# Patient Record
Sex: FEMALE | Race: WHITE | NOT HISPANIC OR LATINO | Employment: OTHER | URBAN - NONMETROPOLITAN AREA
[De-identification: names, ages, dates, MRNs, and addresses within clinical notes are randomized per-mention and may not be internally consistent; named-entity substitution may affect disease eponyms.]

---

## 2017-01-12 ENCOUNTER — OFFICE VISIT (OUTPATIENT)
Dept: FAMILY MEDICINE CLINIC | Facility: CLINIC | Age: 67
End: 2017-01-12

## 2017-01-12 VITALS
HEART RATE: 92 BPM | DIASTOLIC BLOOD PRESSURE: 75 MMHG | RESPIRATION RATE: 12 BRPM | OXYGEN SATURATION: 93 % | BODY MASS INDEX: 20.8 KG/M2 | SYSTOLIC BLOOD PRESSURE: 120 MMHG | HEIGHT: 62 IN | TEMPERATURE: 98.4 F | WEIGHT: 113 LBS

## 2017-01-12 DIAGNOSIS — Z00.00 HEALTHCARE MAINTENANCE: ICD-10-CM

## 2017-01-12 DIAGNOSIS — Z12.31 ENCOUNTER FOR SCREENING MAMMOGRAM FOR BREAST CANCER: ICD-10-CM

## 2017-01-12 DIAGNOSIS — G43.009 MIGRAINE WITHOUT AURA AND WITHOUT STATUS MIGRAINOSUS, NOT INTRACTABLE: ICD-10-CM

## 2017-01-12 DIAGNOSIS — R73.03 PREDIABETES: ICD-10-CM

## 2017-01-12 DIAGNOSIS — E78.2 MIXED HYPERLIPIDEMIA: ICD-10-CM

## 2017-01-12 DIAGNOSIS — J44.9 CHRONIC OBSTRUCTIVE PULMONARY DISEASE, UNSPECIFIED COPD TYPE (HCC): ICD-10-CM

## 2017-01-12 DIAGNOSIS — M81.0 OSTEOPOROSIS: ICD-10-CM

## 2017-01-12 DIAGNOSIS — K21.9 GASTROESOPHAGEAL REFLUX DISEASE WITHOUT ESOPHAGITIS: ICD-10-CM

## 2017-01-12 DIAGNOSIS — N39.0 RECURRENT UTI: ICD-10-CM

## 2017-01-12 DIAGNOSIS — K58.8 OTHER IRRITABLE BOWEL SYNDROME: ICD-10-CM

## 2017-01-12 DIAGNOSIS — I10 ESSENTIAL HYPERTENSION: Primary | ICD-10-CM

## 2017-01-12 DIAGNOSIS — M15.9 PRIMARY OSTEOARTHRITIS INVOLVING MULTIPLE JOINTS: ICD-10-CM

## 2017-01-12 DIAGNOSIS — N32.89 BLADDER INSTABILITY: ICD-10-CM

## 2017-01-12 LAB
25(OH)D3 SERPL-MCNC: 31 NG/ML
ALBUMIN SERPL-MCNC: 4.6 G/DL (ref 3.4–4.8)
ALBUMIN/GLOB SERPL: 1.4 G/DL (ref 1.5–2.5)
ALP SERPL-CCNC: 81 U/L (ref 46–116)
ALT SERPL W P-5'-P-CCNC: 14 U/L (ref 10–36)
ANION GAP SERPL CALCULATED.3IONS-SCNC: 3.2 MMOL/L (ref 3.6–11.2)
AST SERPL-CCNC: 15 U/L (ref 10–30)
BASOPHILS # BLD AUTO: 0.04 10*3/MM3 (ref 0–0.3)
BASOPHILS NFR BLD AUTO: 0.4 % (ref 0–2)
BILIRUB SERPL-MCNC: 0.5 MG/DL (ref 0.2–1.8)
BUN BLD-MCNC: 10 MG/DL (ref 7–21)
BUN/CREAT SERPL: 12.8 (ref 7–25)
CALCIUM SPEC-SCNC: 9.6 MG/DL (ref 7.7–10)
CHLORIDE SERPL-SCNC: 109 MMOL/L (ref 99–112)
CHOLEST SERPL-MCNC: 197 MG/DL (ref 0–200)
CO2 SERPL-SCNC: 27.8 MMOL/L (ref 24.3–31.9)
CREAT BLD-MCNC: 0.78 MG/DL (ref 0.43–1.29)
DEPRECATED RDW RBC AUTO: 47.1 FL (ref 37–54)
EOSINOPHIL # BLD AUTO: 0.21 10*3/MM3 (ref 0–0.7)
EOSINOPHIL NFR BLD AUTO: 2.3 % (ref 0–7)
ERYTHROCYTE [DISTWIDTH] IN BLOOD BY AUTOMATED COUNT: 13.4 % (ref 11.5–14.5)
GFR SERPL CREATININE-BSD FRML MDRD: 74 ML/MIN/1.73
GLOBULIN UR ELPH-MCNC: 3.4 GM/DL
GLUCOSE BLD-MCNC: 101 MG/DL (ref 70–110)
HBA1C MFR BLD: 5.6 % (ref 4.5–5.7)
HCT VFR BLD AUTO: 39 % (ref 37–47)
HDLC SERPL-MCNC: 70 MG/DL (ref 60–100)
HGB BLD-MCNC: 13.1 G/DL (ref 12–16)
IMM GRANULOCYTES # BLD: 0.02 10*3/MM3 (ref 0–0.03)
IMM GRANULOCYTES NFR BLD: 0.2 % (ref 0–0.5)
LDLC SERPL CALC-MCNC: 103 MG/DL (ref 0–100)
LDLC/HDLC SERPL: 1.47 {RATIO}
LYMPHOCYTES # BLD AUTO: 3.56 10*3/MM3 (ref 1–3)
LYMPHOCYTES NFR BLD AUTO: 38.5 % (ref 16–46)
MCH RBC QN AUTO: 32.4 PG (ref 27–33)
MCHC RBC AUTO-ENTMCNC: 33.6 G/DL (ref 33–37)
MCV RBC AUTO: 96.5 FL (ref 80–94)
MONOCYTES # BLD AUTO: 0.5 10*3/MM3 (ref 0.1–0.9)
MONOCYTES NFR BLD AUTO: 5.4 % (ref 0–12)
NEUTROPHILS # BLD AUTO: 4.92 10*3/MM3 (ref 1.4–6.5)
NEUTROPHILS NFR BLD AUTO: 53.2 % (ref 40–75)
OSMOLALITY SERPL CALC.SUM OF ELEC: 278.6 MOSM/KG (ref 273–305)
PLATELET # BLD AUTO: 254 10*3/MM3 (ref 130–400)
PMV BLD AUTO: 10.9 FL (ref 6–10)
POTASSIUM BLD-SCNC: 3.5 MMOL/L (ref 3.5–5.3)
PROT SERPL-MCNC: 8 G/DL (ref 6–8)
RBC # BLD AUTO: 4.04 10*6/MM3 (ref 4.2–5.4)
SODIUM BLD-SCNC: 140 MMOL/L (ref 135–153)
TRIGL SERPL-MCNC: 120 MG/DL (ref 0–150)
TSH SERPL DL<=0.05 MIU/L-ACNC: 5.22 MIU/ML (ref 0.55–4.78)
VLDLC SERPL-MCNC: 24 MG/DL
WBC NRBC COR # BLD: 9.25 10*3/MM3 (ref 4.5–12.5)

## 2017-01-12 PROCEDURE — 36415 COLL VENOUS BLD VENIPUNCTURE: CPT | Performed by: GENERAL PRACTICE

## 2017-01-12 PROCEDURE — 80061 LIPID PANEL: CPT | Performed by: GENERAL PRACTICE

## 2017-01-12 PROCEDURE — 82306 VITAMIN D 25 HYDROXY: CPT | Performed by: GENERAL PRACTICE

## 2017-01-12 PROCEDURE — 85025 COMPLETE CBC W/AUTO DIFF WBC: CPT | Performed by: GENERAL PRACTICE

## 2017-01-12 PROCEDURE — 83036 HEMOGLOBIN GLYCOSYLATED A1C: CPT | Performed by: GENERAL PRACTICE

## 2017-01-12 PROCEDURE — 36415 COLL VENOUS BLD VENIPUNCTURE: CPT

## 2017-01-12 PROCEDURE — 80053 COMPREHEN METABOLIC PANEL: CPT | Performed by: GENERAL PRACTICE

## 2017-01-12 PROCEDURE — 99213 OFFICE O/P EST LOW 20 MIN: CPT | Performed by: GENERAL PRACTICE

## 2017-01-12 PROCEDURE — 84443 ASSAY THYROID STIM HORMONE: CPT | Performed by: GENERAL PRACTICE

## 2017-01-12 RX ORDER — PANTOPRAZOLE SODIUM 40 MG/1
40 TABLET, DELAYED RELEASE ORAL 2 TIMES DAILY
Qty: 60 TABLET | Refills: 5 | Status: SHIPPED | OUTPATIENT
Start: 2017-01-12 | End: 2017-05-12 | Stop reason: SDUPTHER

## 2017-01-12 NOTE — MR AVS SNAPSHOT
Albino AGUIRRE Sameer   1/12/2017 11:00 AM   Office Visit    Dept Phone:  [phone]   Encounter #:  [id number]    Provider:  Stan Alvarado MD   Department:  [facility]                Your Full Care Plan              Today's Medication Changes          These changes are accurate as of: 1/12/17 12:03 PM.  If you have any questions, ask your nurse or doctor.               Medication(s)that have changed:     pantoprazole 40 MG EC tablet   Commonly known as:  PROTONIX   Take 1 tablet by mouth 2 (Two) Times a Day.   What changed:  when to take this   Changed by:  Stan Alvarado MD            Where to Get Your Medications      These medications were sent to [place], [place] - [address] - [phone] [facility] [phone] [address] [address]     Phone:  [phone]     pantoprazole 40 MG EC tablet                  Your Updated Medication List          This list is accurate as of: 1/12/17 12:03 PM.  Always use your most recent med list.                diltiaZEM 360 MG 24 hr capsule   Commonly known as:  TIAZAC   Take 1 capsule by mouth daily.       estradiol 0.1 MG/GM vaginal cream   Commonly known as:  ESTRACE   Insert 2 g into the vagina 2 (two) times a week.       fluconazole 150 MG tablet   Commonly known as:  DIFLUCAN   Take 1 tablet by mouth Daily.       hydrochlorothiazide 25 MG tablet   Commonly known as:  HYDRODIURIL   Take 1 tablet by mouth daily. 1/2 tablet daily       ipratropium-albuterol 0.5-2.5 mg/mL nebulizer   Commonly known as:  DUONEB   Take 3 mL by nebulization Every 4 (Four) Hours As Needed for wheezing or shortness of air. Via nebulizer       meclizine 25 MG tablet   Commonly known as:  ANTIVERT   Take 1 tablet by mouth 3 (three) times a day as needed for dizziness.       montelukast 10 MG tablet   Commonly known as:  SINGULAIR   Take 1 tablet by mouth daily.       pantoprazole 40 MG EC  tablet   Commonly known as:  PROTONIX   Take 1 tablet by mouth 2 (Two) Times a Day.       simvastatin 40 MG tablet   Commonly known as:  ZOCOR   Take 1 tablet by mouth Every Night.       SUMAtriptan 100 MG tablet   Commonly known as:  IMITREX   Take 1 tablet by mouth 1 (One) Time As Needed for migraine for up to 1 dose.       tolterodine LA 4 MG 24 hr capsule   Commonly known as:  DETROL LA   Take 1 capsule by mouth every night.       vitamin B-12 1000 MCG tablet   Commonly known as:  CYANOCOBALAMIN   Take 1 tablet by mouth daily.               We Performed the Following     CBC & Differential     CBC Auto Differential     Comprehensive Metabolic Panel     Hemoglobin A1c     Lipid Panel     TSH     Vitamin D 25 Hydroxy       You Were Diagnosed With        Codes Comments    Essential hypertension    -  Primary ICD-10-CM: I10  ICD-9-CM: 401.9     Migraine without aura and without status migrainosus, not intractable     ICD-10-CM: G43.009  ICD-9-CM: 346.10     Chronic obstructive pulmonary disease, unspecified COPD type     ICD-10-CM: J44.9  ICD-9-CM: 496     Gastroesophageal reflux disease without esophagitis     ICD-10-CM: K21.9  ICD-9-CM: 530.81     Other irritable bowel syndrome     ICD-10-CM: K58.8  ICD-9-CM: 564.1     Bladder instability     ICD-10-CM: N32.89  ICD-9-CM: 596.89     Osteoporosis     ICD-10-CM: M81.0  ICD-9-CM: 733.00     Primary osteoarthritis involving multiple joints     ICD-10-CM: M15.0  ICD-9-CM: 715.09     Recurrent UTI     ICD-10-CM: N39.0  ICD-9-CM: 599.0     Mixed hyperlipidemia     ICD-10-CM: E78.2  ICD-9-CM: 272.2     Prediabetes     ICD-10-CM: R73.03  ICD-9-CM: 790.29     Healthcare maintenance     ICD-10-CM: Z00.00  ICD-9-CM: V70.0     Encounter for screening mammogram for breast cancer     ICD-10-CM: Z12.31  ICD-9-CM: V76.12       Instructions     None    Patient Instructions History      Upcoming Appointments     Visit Type Date Time Department    OFFICE VISIT 1/12/2017 11:00 AM [facility]  "[facility]    OFFICE VISIT 5/12/2017 10:30 AM [facility]      MyChart Signup     Our records indicate that you have declined [facility]. If you would like to sign up for [employer], please email [email or web address] or call [phone] to obtain an activation code.             Other Info from Your Visit           Your Appointments     May 12, 2017 10:30 AM EDT   Office Visit with Stan Alvarado MD   [facility] (--)    [address] [place] [id number] [phone]           Please arrive 10 minutes early. Bring a complete list of all medications and bring any previous records or diagnostic testing results.              Allergies     No Known Allergies      Vital Signs     Pulse Temperature Height Weight Oxygen Saturation Body Mass Index    108 98.4 °F (36.9 °C) (Tympanic) 62\" (157.5 cm) 113 lb (51.3 kg) 93% 20.67 kg/m2    Smoking Status                   Current Every Day Smoker           Problems and Diagnoses Noted     Irritable bladder    Chronic airway obstruction    Encounter for screening mammogram for breast cancer    High blood pressure    Acid reflux disease    Routine medical exam    Spasmodic colon    Migraines    Mixed hyperlipidemia    Osteoporosis    Prediabetes    Osteoarthritis (arthritis due to wear and tear of joints)    Recurrent UTI      Results         "

## 2017-01-12 NOTE — PROGRESS NOTES
Subjective   Albino Estrella is a 66 y.o. female.     History of Present Illness     COPD  The patient reports that her SOB, cough and wheezing have been stable since last here. She states that these symptoms occur at any time during the day or night. She reports that her symptoms become worse with activity. Her symptoms are reduced after using  her albuterol/ipratroprium nebulizer. The patient states she also has nasal congestion and post nasal drip/clearing throat. She is following the treatment plan, including medications as directed. She currently smokes approximately 1 packs per day.    Hypertension  Home blood pressure readings: not doing. Associated signs and symptoms: dyspnea. Patient denies: chest pain, palpitations, orthopnea, paroxysmal nocturnal dyspnea and peripheral edema. Current antihypertensive medications includes diltiazem and HCTZ. Medication compliance: taking as prescribed.  She has yet to undergo the blood work arranged at her last several visits    GERD  Patient gives a history of of heartburn and regurgitation. Symptoms have been present for a number years . Symptoms include abdominal bloating. The patient denies dysphagia, hematemesis and melena. Symptoms appear to be worsened by large meals and lying down. Risk factors present for GERD include tobacco abuse and caffeine use. Risk factors absent for GERD are tobacco abuse and caffeine use. Studies performed so far include none. Treatments tried so far include lifestyle change including caffeine reduction, dietary changes, elevate HOB, tobacco cessation, proton pump inhibitor: pantoprazole. Results of treatment: almost no occurrences of symptoms provided she takes this twice daily. Currently, the symptoms are intermittent and occur approximately 1 times per month.    The following portions of the patient's history were reviewed and updated as appropriate: allergies, current medications, past medical history, past social history and problem  list.    Review of Systems   Constitutional: Positive for fatigue. Negative for appetite change, chills, fever and unexpected weight change.   HENT: Positive for congestion, postnasal drip and sinus pressure. Negative for ear pain, sneezing, sore throat and voice change.    Eyes: Negative for visual disturbance.   Respiratory: Positive for cough, shortness of breath and wheezing. Negative for stridor.    Cardiovascular: Negative for chest pain, palpitations and leg swelling.   Gastrointestinal: Negative for abdominal pain, blood in stool, constipation, diarrhea, nausea and vomiting.   Endocrine: Negative for polydipsia.   Genitourinary: Negative for difficulty urinating, dysuria, frequency, hematuria, menstrual problem, pelvic pain, urgency, vaginal bleeding and vaginal discharge.   Musculoskeletal: Negative for arthralgias, back pain, joint swelling, myalgias and neck pain.   Skin: Negative for color change.   Neurological: Positive for dizziness (intermittent - responds to meclizine) and headaches (chronic - intermittent - stable). Negative for tremors, weakness and numbness.   Psychiatric/Behavioral: Negative for dysphoric mood, sleep disturbance and suicidal ideas. The patient is not nervous/anxious.      Objective   Physical Exam   Constitutional: She is oriented to person, place, and time. No distress.   Bright and in good spirits. No apparent distress. No pallor, jaundice, diaphoresis, or cyanosis.     HENT:   Head: Atraumatic.   Right Ear: External ear normal.   Left Ear: External ear normal.   Nose: Nose normal.   Mouth/Throat: Oropharynx is clear and moist. Mucous membranes are not pale and not cyanotic.   Eyes: EOM are normal. Pupils are equal, round, and reactive to light. No scleral icterus.   Neck: No JVD present. Carotid bruit is not present. No tracheal deviation present. No thyromegaly present.   Cardiovascular: Normal rate, regular rhythm, S1 normal, S2 normal and intact distal pulses.  Exam  reveals no gallop, no S3 and no S4.    No murmur heard.  Pulmonary/Chest: No accessory muscle usage or stridor. No respiratory distress. She has decreased breath sounds. She has wheezes (mild).   Abdominal: Soft. Normal aorta and bowel sounds are normal. She exhibits no distension, no abdominal bruit and no mass. There is no hepatosplenomegaly. There is no tenderness. No hernia.   Musculoskeletal: She exhibits no tenderness or deformity.       Vascular Status -  Her exam exhibits no right foot edema. Her exam exhibits no left foot edema.  Lymphadenopathy:        Head (right side): No submandibular adenopathy present.        Head (left side): No submandibular adenopathy present.     She has no cervical adenopathy.   Neurological: She is alert and oriented to person, place, and time. She has normal reflexes. She displays normal reflexes. No cranial nerve deficit. She exhibits normal muscle tone. Coordination normal.   Skin: Skin is warm and dry. No rash noted. She is not diaphoretic. No cyanosis. No pallor. Nails show no clubbing.   Psychiatric: She has a normal mood and affect.     Assessment/Plan   Problems Addressed this Visit        Cardiovascular and Mediastinum    Mixed hyperlipidemia  Encouraged to continue to work on her diet and exercise plan.  Fasting labs drawn     Essential hypertension - Primary  Hypertension: at goal. Evidence of target organ damage: none.  Continue current medication    Relevant Orders    CBC Auto Differential (Completed)    Osmolality, Calculated (Completed)    Migraine without aura and without status migrainosus, not intractable       Respiratory    COPD (chronic obstructive pulmonary disease)  COPD is worsening.  Reminded of the importance of smoking cessation  Encouraged to remain as active as symptoms allow for       Digestive    Gastroesophageal reflux disease without esophagitis  Symptoms are currently stable.  Reminded regarding lifestyle modification.  Will continue current  treatment.    Relevant Medications    pantoprazole (PROTONIX) 40 MG EC tablet    IBS (irritable bowel syndrome)       Musculoskeletal and Integument    Bladder instability    Osteoporosis  We'll arrange an updated DEXA scan at the time of her mammogram     Relevant Orders    DEXA Bone Density Axial    Primary osteoarthritis       Genitourinary    H/O Recurrent UTI       Other    Prediabetes  We'll continue to monitor     Healthcare maintenance  Reminded of the potential benefits of both colon and lung cancer screening.  Patient like to consider further and this will be discussed again at her return     Encounter for screening mammogram for breast cancer    Relevant Orders    Mammo Screening Digital Tomosynthesis Bilateral With CAD

## 2017-02-15 DIAGNOSIS — B37.41 CANDIDAL URETHRITIS: ICD-10-CM

## 2017-02-15 RX ORDER — SULFAMETHOXAZOLE AND TRIMETHOPRIM 800; 160 MG/1; MG/1
1 TABLET ORAL 2 TIMES DAILY
Qty: 14 TABLET | Refills: 0 | Status: SHIPPED | OUTPATIENT
Start: 2017-02-15 | End: 2017-02-25

## 2017-02-15 RX ORDER — FLUCONAZOLE 150 MG/1
150 TABLET ORAL DAILY
Qty: 3 TABLET | Refills: 3 | Status: SHIPPED | OUTPATIENT
Start: 2017-02-15 | End: 2017-05-12

## 2017-02-16 ENCOUNTER — TELEPHONE (OUTPATIENT)
Dept: FAMILY MEDICINE CLINIC | Facility: CLINIC | Age: 67
End: 2017-02-16

## 2017-02-16 NOTE — TELEPHONE ENCOUNTER
----- Message from Stan Alvarado MD sent at 2/15/2017  8:02 PM EST -----  Sent    ----- Message -----     From: Serena Velasquez MA     Sent: 2/15/2017  10:05 AM       To: Stan Alvarado MD    Has uti asking for antibiotic for that and something for yeast for afterward just in case. bailey

## 2017-02-28 ENCOUNTER — APPOINTMENT (OUTPATIENT)
Dept: BONE DENSITY | Facility: HOSPITAL | Age: 67
End: 2017-02-28

## 2017-02-28 ENCOUNTER — OFFICE VISIT (OUTPATIENT)
Dept: FAMILY MEDICINE CLINIC | Facility: CLINIC | Age: 67
End: 2017-02-28

## 2017-02-28 ENCOUNTER — APPOINTMENT (OUTPATIENT)
Dept: MAMMOGRAPHY | Facility: HOSPITAL | Age: 67
End: 2017-02-28

## 2017-02-28 VITALS
SYSTOLIC BLOOD PRESSURE: 122 MMHG | OXYGEN SATURATION: 97 % | BODY MASS INDEX: 19.88 KG/M2 | TEMPERATURE: 98.6 F | DIASTOLIC BLOOD PRESSURE: 80 MMHG | HEART RATE: 90 BPM | HEIGHT: 62 IN | WEIGHT: 108 LBS

## 2017-02-28 DIAGNOSIS — N39.0 RECURRENT UTI: ICD-10-CM

## 2017-02-28 DIAGNOSIS — R30.0 DYSURIA: Primary | ICD-10-CM

## 2017-02-28 LAB
BILIRUB BLD-MCNC: NEGATIVE MG/DL
CLARITY, POC: CLEAR
COLOR UR: YELLOW
GLUCOSE UR STRIP-MCNC: NEGATIVE MG/DL
KETONES UR QL: NEGATIVE
LEUKOCYTE EST, POC: ABNORMAL
NITRITE UR-MCNC: NEGATIVE MG/ML
PH UR: 6 [PH] (ref 5–8)
PROT UR STRIP-MCNC: ABNORMAL MG/DL
RBC # UR STRIP: NEGATIVE /UL
SP GR UR: 1.03 (ref 1–1.03)
UROBILINOGEN UR QL: ABNORMAL

## 2017-02-28 PROCEDURE — 99213 OFFICE O/P EST LOW 20 MIN: CPT | Performed by: NURSE PRACTITIONER

## 2017-02-28 PROCEDURE — 96372 THER/PROPH/DIAG INJ SC/IM: CPT | Performed by: NURSE PRACTITIONER

## 2017-02-28 PROCEDURE — 87086 URINE CULTURE/COLONY COUNT: CPT | Performed by: NURSE PRACTITIONER

## 2017-02-28 PROCEDURE — 81003 URINALYSIS AUTO W/O SCOPE: CPT | Performed by: NURSE PRACTITIONER

## 2017-02-28 RX ORDER — CEFTRIAXONE 1 G/1
1 INJECTION, POWDER, FOR SOLUTION INTRAMUSCULAR; INTRAVENOUS ONCE
Status: COMPLETED | OUTPATIENT
Start: 2017-02-28 | End: 2017-02-28

## 2017-02-28 RX ORDER — PHENAZOPYRIDINE HYDROCHLORIDE 100 MG/1
100 TABLET, FILM COATED ORAL 3 TIMES DAILY PRN
Qty: 30 TABLET | Refills: 0 | Status: SHIPPED | OUTPATIENT
Start: 2017-02-28 | End: 2017-05-12

## 2017-02-28 RX ORDER — SULFAMETHOXAZOLE AND TRIMETHOPRIM 800; 160 MG/1; MG/1
1 TABLET ORAL 2 TIMES DAILY
Qty: 20 TABLET | Refills: 0 | Status: SHIPPED | OUTPATIENT
Start: 2017-02-28 | End: 2017-03-10

## 2017-02-28 RX ADMIN — CEFTRIAXONE 1 G: 1 INJECTION, POWDER, FOR SOLUTION INTRAMUSCULAR; INTRAVENOUS at 14:48

## 2017-02-28 NOTE — PROGRESS NOTES
"Subjective   Albino Estrella is a 66 y.o. female.     Chief Complaint   Patient presents with   • Difficulty Urinating       History of Present Illness     UTI-recurrent UTI's.  Has been having symptoms for approx 1 week.  She reports pain, pressure, burning with urination.  Fever and chills.  She has been trying to hydrate but it has not helped symptoms.  Back pain that is more prominent on the mid left side.  Not at goal.     The following portions of the patient's history were reviewed and updated as appropriate: allergies, current medications, past family history, past medical history, past social history, past surgical history and problem list.    Review of Systems   Constitutional: Positive for appetite change, chills, fatigue and fever.   Gastrointestinal: Negative for nausea and vomiting.   Genitourinary: Positive for difficulty urinating, dysuria, frequency and urgency. Negative for hematuria.   Musculoskeletal: Positive for back pain.   Neurological: Positive for weakness.   All other systems reviewed and are negative.      Objective     Visit Vitals   • /80   • Pulse 90   • Temp 98.6 °F (37 °C) (Tympanic)   • Ht 62\" (157.5 cm)   • Wt 108 lb (49 kg)   • SpO2 97%   • BMI 19.75 kg/m2       Physical Exam   Constitutional: She is oriented to person, place, and time. No distress.   Bright and in good spirits. No apparent distress. No pallor, jaundice, diaphoresis, or cyanosis.     HENT:   Head: Atraumatic.   Right Ear: External ear normal.   Left Ear: External ear normal.   Nose: Nose normal.   Mouth/Throat: Oropharynx is clear and moist. Mucous membranes are not pale and not cyanotic.   Eyes: EOM are normal. Pupils are equal, round, and reactive to light. No scleral icterus.   Neck: No JVD present. Carotid bruit is not present. No tracheal deviation present. No thyromegaly present.   Cardiovascular: Normal rate, regular rhythm, S1 normal, S2 normal and intact distal pulses.  Exam reveals no gallop, no S3 " and no S4.    No murmur heard.  Pulmonary/Chest: No accessory muscle usage or stridor. No respiratory distress. She has decreased breath sounds. She has wheezes (mild).   Abdominal: Soft. Normal aorta and bowel sounds are normal. She exhibits no distension, no abdominal bruit and no mass. There is no hepatosplenomegaly. There is no tenderness. There is CVA tenderness (left). No hernia.   Musculoskeletal: She exhibits no tenderness or deformity.       Vascular Status -  Her exam exhibits no right foot edema. Her exam exhibits no left foot edema.  Lymphadenopathy:        Head (right side): No submandibular adenopathy present.        Head (left side): No submandibular adenopathy present.     She has no cervical adenopathy.   Neurological: She is alert and oriented to person, place, and time. She has normal reflexes. She displays normal reflexes. No cranial nerve deficit. She exhibits normal muscle tone. Coordination normal.   Skin: Skin is warm and dry. No rash noted. She is not diaphoretic. No cyanosis. No pallor. Nails show no clubbing.   Psychiatric: She has a normal mood and affect.   Vitals reviewed.      Assessment/Plan     Albino was seen today for difficulty urinating.    Diagnoses and all orders for this visit:    Dysuria  -     POC Urinalysis Dipstick, Automated  -     Urinalysis With / Microscopic If Indicated; Future  -     Urine Culture; Future  -     cefTRIAXone (ROCEPHIN) injection 1 g; Inject 1 g into the shoulder, thigh, or buttocks 1 (One) Time.  -     sulfamethoxazole-trimethoprim (BACTRIM DS,SEPTRA DS) 800-160 MG per tablet; Take 1 tablet by mouth 2 (Two) Times a Day for 10 days.  -     Urine Culture    Recurrent UTI  -     cefTRIAXone (ROCEPHIN) injection 1 g; Inject 1 g into the shoulder, thigh, or buttocks 1 (One) Time.  -     sulfamethoxazole-trimethoprim (BACTRIM DS,SEPTRA DS) 800-160 MG per tablet; Take 1 tablet by mouth 2 (Two) Times a Day for 10 days.  -     phenazopyridine (PYRIDIUM) 100 MG  tablet; Take 1 tablet by mouth 3 (Three) Times a Day As Needed for bladder spasms.      Injections today for acute symptom relief.   Instructed to complete all of antibiotics for acute illness.  Increase PO fluids, avoid caffeine.  Do not save meds for later use.  Rest PRN  Urine culture. Will change antibiotics if not susceptible to currently prescribed medication.   Understands disease processes and need for medications.  Understands reasons for urgent and emergent care.  Patient (& family) verbalized agreement for treatment plan.   RTC PRN 3-5 days for worsening or non resolving symptoms

## 2017-03-03 LAB — BACTERIA SPEC AEROBE CULT: NORMAL

## 2017-03-13 ENCOUNTER — HOSPITAL ENCOUNTER (OUTPATIENT)
Dept: BONE DENSITY | Facility: HOSPITAL | Age: 67
Discharge: HOME | End: 2017-03-13
Admitting: GENERAL PRACTICE

## 2017-03-13 ENCOUNTER — HOSPITAL ENCOUNTER (OUTPATIENT)
Dept: MAMMOGRAPHY | Facility: HOSPITAL | Age: 67
Discharge: HOME | End: 2017-03-13

## 2017-03-13 DIAGNOSIS — M81.0 OSTEOPOROSIS: ICD-10-CM

## 2017-03-13 DIAGNOSIS — Z12.31 ENCOUNTER FOR SCREENING MAMMOGRAM FOR BREAST CANCER: ICD-10-CM

## 2017-03-13 PROCEDURE — 77063 BREAST TOMOSYNTHESIS BI: CPT

## 2017-03-13 PROCEDURE — 77063 BREAST TOMOSYNTHESIS BI: CPT | Performed by: RADIOLOGY

## 2017-03-13 PROCEDURE — G0202 SCR MAMMO BI INCL CAD: HCPCS

## 2017-03-13 PROCEDURE — 77080 DXA BONE DENSITY AXIAL: CPT

## 2017-03-13 PROCEDURE — G0202 SCR MAMMO BI INCL CAD: HCPCS | Performed by: RADIOLOGY

## 2017-03-13 PROCEDURE — 77080 DXA BONE DENSITY AXIAL: CPT | Performed by: RADIOLOGY

## 2017-03-22 DIAGNOSIS — I10 ESSENTIAL HYPERTENSION: ICD-10-CM

## 2017-03-22 RX ORDER — DILTIAZEM HYDROCHLORIDE 360 MG/1
CAPSULE, EXTENDED RELEASE ORAL
Qty: 30 CAPSULE | Refills: 5 | Status: SHIPPED | OUTPATIENT
Start: 2017-03-22 | End: 2017-03-25 | Stop reason: SDUPTHER

## 2017-03-25 DIAGNOSIS — I10 ESSENTIAL HYPERTENSION: ICD-10-CM

## 2017-03-27 RX ORDER — DILTIAZEM HYDROCHLORIDE 360 MG/1
CAPSULE, EXTENDED RELEASE ORAL
Qty: 30 CAPSULE | Refills: 5 | Status: SHIPPED | OUTPATIENT
Start: 2017-03-27 | End: 2017-03-29 | Stop reason: SDUPTHER

## 2017-03-29 ENCOUNTER — TELEPHONE (OUTPATIENT)
Dept: FAMILY MEDICINE CLINIC | Facility: CLINIC | Age: 67
End: 2017-03-29

## 2017-03-29 DIAGNOSIS — I10 ESSENTIAL HYPERTENSION: ICD-10-CM

## 2017-03-29 RX ORDER — NITROFURANTOIN 25; 75 MG/1; MG/1
100 CAPSULE ORAL 2 TIMES DAILY
Qty: 20 CAPSULE | Refills: 0 | Status: SHIPPED | OUTPATIENT
Start: 2017-03-29 | End: 2017-05-12

## 2017-03-29 RX ORDER — DILTIAZEM HYDROCHLORIDE 360 MG/1
CAPSULE, EXTENDED RELEASE ORAL
Qty: 30 CAPSULE | Refills: 5 | Status: SHIPPED | OUTPATIENT
Start: 2017-03-29 | End: 2017-04-03 | Stop reason: SDUPTHER

## 2017-03-29 NOTE — TELEPHONE ENCOUNTER
----- Message from Stan Alvarado MD sent at 3/29/2017  9:57 AM EDT -----  Sola sent a script for nitrofurantion but i sort of doubt it will help - her urine tests from 2/28/17 were virtually normal    ----- Message -----     From: Serena Velasquez MA     Sent: 3/28/2017   9:56 AM       To: Stan Alvarado MD    Was seen in the office few weeks ago and treated for uti. Is no better and is requesting you send her something to [facility]? Will talk to you about different options at her upcoming appt

## 2017-04-03 DIAGNOSIS — I10 ESSENTIAL HYPERTENSION: ICD-10-CM

## 2017-04-03 RX ORDER — DILTIAZEM HYDROCHLORIDE 360 MG/1
CAPSULE, EXTENDED RELEASE ORAL
Qty: 30 CAPSULE | Refills: 5 | Status: SHIPPED | OUTPATIENT
Start: 2017-04-03 | End: 2017-05-12 | Stop reason: SDUPTHER

## 2017-04-05 RX ORDER — DOXYCYCLINE HYCLATE 100 MG/1
CAPSULE ORAL
Qty: 20 CAPSULE | Refills: 0 | OUTPATIENT
Start: 2017-04-05

## 2017-05-12 ENCOUNTER — OFFICE VISIT (OUTPATIENT)
Dept: FAMILY MEDICINE CLINIC | Facility: CLINIC | Age: 67
End: 2017-05-12

## 2017-05-12 DIAGNOSIS — K21.9 GASTROESOPHAGEAL REFLUX DISEASE WITHOUT ESOPHAGITIS: ICD-10-CM

## 2017-05-12 DIAGNOSIS — J44.9 CHRONIC OBSTRUCTIVE PULMONARY DISEASE, UNSPECIFIED COPD TYPE (HCC): ICD-10-CM

## 2017-05-12 DIAGNOSIS — G43.009 MIGRAINE WITHOUT AURA AND WITHOUT STATUS MIGRAINOSUS, NOT INTRACTABLE: ICD-10-CM

## 2017-05-12 DIAGNOSIS — E78.2 MIXED HYPERLIPIDEMIA: Primary | ICD-10-CM

## 2017-05-12 DIAGNOSIS — N32.89 BLADDER INSTABILITY: ICD-10-CM

## 2017-05-12 DIAGNOSIS — Z00.00 HEALTHCARE MAINTENANCE: ICD-10-CM

## 2017-05-12 DIAGNOSIS — R42 VERTIGO: ICD-10-CM

## 2017-05-12 DIAGNOSIS — M81.0 OSTEOPOROSIS: ICD-10-CM

## 2017-05-12 DIAGNOSIS — N39.0 RECURRENT UTI: ICD-10-CM

## 2017-05-12 DIAGNOSIS — K58.8 OTHER IRRITABLE BOWEL SYNDROME: ICD-10-CM

## 2017-05-12 DIAGNOSIS — I10 ESSENTIAL HYPERTENSION: ICD-10-CM

## 2017-05-12 DIAGNOSIS — M15.9 PRIMARY OSTEOARTHRITIS INVOLVING MULTIPLE JOINTS: ICD-10-CM

## 2017-05-12 DIAGNOSIS — R73.03 PREDIABETES: ICD-10-CM

## 2017-05-12 PROCEDURE — 99214 OFFICE O/P EST MOD 30 MIN: CPT | Performed by: GENERAL PRACTICE

## 2017-05-12 RX ORDER — HYDROCHLOROTHIAZIDE 25 MG/1
25 TABLET ORAL DAILY
Qty: 30 TABLET | Refills: 5 | Status: SHIPPED | OUTPATIENT
Start: 2017-05-12 | End: 2017-08-17 | Stop reason: SDUPTHER

## 2017-05-12 RX ORDER — RISEDRONATE SODIUM 150 MG/1
150 TABLET, FILM COATED ORAL
Qty: 1 TABLET | Refills: 5 | Status: SHIPPED | OUTPATIENT
Start: 2017-05-12 | End: 2017-08-17 | Stop reason: SDUPTHER

## 2017-05-12 RX ORDER — ATORVASTATIN CALCIUM 40 MG/1
40 TABLET, FILM COATED ORAL NIGHTLY
Qty: 30 TABLET | Refills: 5 | Status: SHIPPED | OUTPATIENT
Start: 2017-05-12 | End: 2017-08-17 | Stop reason: SDUPTHER

## 2017-05-12 RX ORDER — DILTIAZEM HYDROCHLORIDE 360 MG/1
360 CAPSULE, EXTENDED RELEASE ORAL DAILY
Qty: 30 CAPSULE | Refills: 5 | Status: SHIPPED | OUTPATIENT
Start: 2017-05-12 | End: 2017-08-17 | Stop reason: SDUPTHER

## 2017-05-12 RX ORDER — PANTOPRAZOLE SODIUM 40 MG/1
40 TABLET, DELAYED RELEASE ORAL 2 TIMES DAILY
Qty: 60 TABLET | Refills: 5 | Status: SHIPPED | OUTPATIENT
Start: 2017-05-12 | End: 2017-08-17 | Stop reason: SDUPTHER

## 2017-05-12 RX ORDER — SUMATRIPTAN 100 MG/1
100 TABLET, FILM COATED ORAL ONCE AS NEEDED
Qty: 9 TABLET | Refills: 5 | Status: SHIPPED | OUTPATIENT
Start: 2017-05-12 | End: 2017-08-17 | Stop reason: SDUPTHER

## 2017-05-12 RX ORDER — MECLIZINE HYDROCHLORIDE 25 MG/1
25 TABLET ORAL 3 TIMES DAILY PRN
Qty: 90 TABLET | Refills: 5 | Status: SHIPPED | OUTPATIENT
Start: 2017-05-12 | End: 2017-08-17 | Stop reason: SDUPTHER

## 2017-05-12 RX ORDER — LANOLIN ALCOHOL/MO/W.PET/CERES
1000 CREAM (GRAM) TOPICAL DAILY
Qty: 30 TABLET | Refills: 5 | Status: SHIPPED | OUTPATIENT
Start: 2017-05-12 | End: 2017-11-20 | Stop reason: SDUPTHER

## 2017-05-12 RX ORDER — IPRATROPIUM BROMIDE AND ALBUTEROL SULFATE 2.5; .5 MG/3ML; MG/3ML
3 SOLUTION RESPIRATORY (INHALATION) EVERY 4 HOURS PRN
Qty: 540 ML | Refills: 5 | Status: SHIPPED | OUTPATIENT
Start: 2017-05-12 | End: 2017-08-17 | Stop reason: SDUPTHER

## 2017-05-12 RX ORDER — TOLTERODINE 4 MG/1
4 CAPSULE, EXTENDED RELEASE ORAL NIGHTLY
Qty: 30 CAPSULE | Refills: 5 | Status: SHIPPED | OUTPATIENT
Start: 2017-05-12 | End: 2017-08-17 | Stop reason: SDUPTHER

## 2017-05-12 RX ORDER — MONTELUKAST SODIUM 10 MG/1
10 TABLET ORAL DAILY
Qty: 30 TABLET | Refills: 5 | Status: SHIPPED | OUTPATIENT
Start: 2017-05-12 | End: 2017-08-17 | Stop reason: SDUPTHER

## 2017-05-12 RX ORDER — ESTRADIOL 0.1 MG/G
2 CREAM VAGINAL 2 TIMES WEEKLY
Qty: 20 G | Refills: 5 | Status: SHIPPED | OUTPATIENT
Start: 2017-05-15 | End: 2017-08-17 | Stop reason: SDUPTHER

## 2017-05-13 VITALS
TEMPERATURE: 99.1 F | DIASTOLIC BLOOD PRESSURE: 65 MMHG | RESPIRATION RATE: 12 BRPM | BODY MASS INDEX: 19.69 KG/M2 | WEIGHT: 107 LBS | HEIGHT: 62 IN | HEART RATE: 86 BPM | SYSTOLIC BLOOD PRESSURE: 125 MMHG | OXYGEN SATURATION: 90 %

## 2017-08-17 ENCOUNTER — OFFICE VISIT (OUTPATIENT)
Dept: FAMILY MEDICINE CLINIC | Facility: CLINIC | Age: 67
End: 2017-08-17

## 2017-08-17 VITALS
RESPIRATION RATE: 12 BRPM | WEIGHT: 112 LBS | OXYGEN SATURATION: 93 % | BODY MASS INDEX: 20.61 KG/M2 | DIASTOLIC BLOOD PRESSURE: 70 MMHG | TEMPERATURE: 99.1 F | HEART RATE: 95 BPM | SYSTOLIC BLOOD PRESSURE: 120 MMHG | HEIGHT: 62 IN

## 2017-08-17 DIAGNOSIS — J44.9 CHRONIC OBSTRUCTIVE PULMONARY DISEASE, UNSPECIFIED COPD TYPE (HCC): ICD-10-CM

## 2017-08-17 DIAGNOSIS — R73.03 PREDIABETES: ICD-10-CM

## 2017-08-17 DIAGNOSIS — K21.9 GASTROESOPHAGEAL REFLUX DISEASE WITHOUT ESOPHAGITIS: ICD-10-CM

## 2017-08-17 DIAGNOSIS — E78.2 MIXED HYPERLIPIDEMIA: ICD-10-CM

## 2017-08-17 DIAGNOSIS — B37.31 CANDIDAL VAGINITIS: ICD-10-CM

## 2017-08-17 DIAGNOSIS — K58.8 OTHER IRRITABLE BOWEL SYNDROME: ICD-10-CM

## 2017-08-17 DIAGNOSIS — R30.0 DYSURIA: Primary | ICD-10-CM

## 2017-08-17 DIAGNOSIS — M15.9 PRIMARY OSTEOARTHRITIS INVOLVING MULTIPLE JOINTS: ICD-10-CM

## 2017-08-17 DIAGNOSIS — M81.0 OSTEOPOROSIS: ICD-10-CM

## 2017-08-17 DIAGNOSIS — G43.009 MIGRAINE WITHOUT AURA AND WITHOUT STATUS MIGRAINOSUS, NOT INTRACTABLE: ICD-10-CM

## 2017-08-17 DIAGNOSIS — N32.89 BLADDER INSTABILITY: ICD-10-CM

## 2017-08-17 DIAGNOSIS — Z00.00 HEALTHCARE MAINTENANCE: ICD-10-CM

## 2017-08-17 DIAGNOSIS — R42 VERTIGO: ICD-10-CM

## 2017-08-17 DIAGNOSIS — N39.0 RECURRENT UTI: ICD-10-CM

## 2017-08-17 DIAGNOSIS — Z87.440 H/O RECURRENT URINARY TRACT INFECTION: ICD-10-CM

## 2017-08-17 DIAGNOSIS — I10 ESSENTIAL HYPERTENSION: ICD-10-CM

## 2017-08-17 LAB
BILIRUB BLD-MCNC: NEGATIVE MG/DL
CLARITY, POC: CLEAR
COLOR UR: YELLOW
GLUCOSE UR STRIP-MCNC: NEGATIVE MG/DL
KETONES UR QL: NEGATIVE
LEUKOCYTE EST, POC: NEGATIVE
NITRITE UR-MCNC: NEGATIVE MG/ML
PH UR: 6 [PH] (ref 5–8)
PROT UR STRIP-MCNC: NEGATIVE MG/DL
RBC # UR STRIP: NEGATIVE /UL
SP GR UR: 1.02 (ref 1–1.03)
UROBILINOGEN UR QL: NORMAL

## 2017-08-17 PROCEDURE — 99214 OFFICE O/P EST MOD 30 MIN: CPT | Performed by: GENERAL PRACTICE

## 2017-08-17 PROCEDURE — 81003 URINALYSIS AUTO W/O SCOPE: CPT | Performed by: GENERAL PRACTICE

## 2017-08-17 RX ORDER — IPRATROPIUM BROMIDE AND ALBUTEROL SULFATE 2.5; .5 MG/3ML; MG/3ML
3 SOLUTION RESPIRATORY (INHALATION) EVERY 4 HOURS PRN
Qty: 540 ML | Refills: 5 | Status: SHIPPED | OUTPATIENT
Start: 2017-08-17 | End: 2017-11-20 | Stop reason: SDUPTHER

## 2017-08-17 RX ORDER — ATORVASTATIN CALCIUM 40 MG/1
40 TABLET, FILM COATED ORAL NIGHTLY
Qty: 30 TABLET | Refills: 5 | Status: SHIPPED | OUTPATIENT
Start: 2017-08-17 | End: 2017-11-20 | Stop reason: SDUPTHER

## 2017-08-17 RX ORDER — DILTIAZEM HYDROCHLORIDE 360 MG/1
360 CAPSULE, EXTENDED RELEASE ORAL DAILY
Qty: 30 CAPSULE | Refills: 5 | Status: SHIPPED | OUTPATIENT
Start: 2017-08-17 | End: 2017-11-20 | Stop reason: SDUPTHER

## 2017-08-17 RX ORDER — PANTOPRAZOLE SODIUM 40 MG/1
40 TABLET, DELAYED RELEASE ORAL 2 TIMES DAILY
Qty: 60 TABLET | Refills: 5 | Status: SHIPPED | OUTPATIENT
Start: 2017-08-17 | End: 2017-11-20 | Stop reason: SDUPTHER

## 2017-08-17 RX ORDER — FLUCONAZOLE 150 MG/1
150 TABLET ORAL ONCE
Qty: 1 TABLET | Refills: 0 | Status: SHIPPED | OUTPATIENT
Start: 2017-08-17 | End: 2017-08-17

## 2017-08-17 RX ORDER — RISEDRONATE SODIUM 150 MG/1
150 TABLET, FILM COATED ORAL
Qty: 1 TABLET | Refills: 5 | Status: SHIPPED | OUTPATIENT
Start: 2017-08-17 | End: 2017-11-20 | Stop reason: SDUPTHER

## 2017-08-17 RX ORDER — SUMATRIPTAN 100 MG/1
100 TABLET, FILM COATED ORAL ONCE AS NEEDED
Qty: 9 TABLET | Refills: 5 | Status: SHIPPED | OUTPATIENT
Start: 2017-08-17 | End: 2017-11-20 | Stop reason: SDUPTHER

## 2017-08-17 RX ORDER — MECLIZINE HYDROCHLORIDE 25 MG/1
25 TABLET ORAL 3 TIMES DAILY PRN
Qty: 90 TABLET | Refills: 5 | Status: SHIPPED | OUTPATIENT
Start: 2017-08-17 | End: 2018-12-04 | Stop reason: SDUPTHER

## 2017-08-17 RX ORDER — TOLTERODINE 4 MG/1
4 CAPSULE, EXTENDED RELEASE ORAL NIGHTLY
Qty: 30 CAPSULE | Refills: 5 | Status: SHIPPED | OUTPATIENT
Start: 2017-08-17 | End: 2017-11-20 | Stop reason: SDUPTHER

## 2017-08-17 RX ORDER — ESTRADIOL 0.1 MG/G
2 CREAM VAGINAL 2 TIMES WEEKLY
Qty: 20 G | Refills: 5 | Status: SHIPPED | OUTPATIENT
Start: 2017-08-17 | End: 2017-11-20 | Stop reason: SDUPTHER

## 2017-08-17 RX ORDER — HYDROCHLOROTHIAZIDE 25 MG/1
25 TABLET ORAL DAILY
Qty: 30 TABLET | Refills: 5 | Status: SHIPPED | OUTPATIENT
Start: 2017-08-17 | End: 2017-11-20 | Stop reason: SDUPTHER

## 2017-08-17 RX ORDER — MONTELUKAST SODIUM 10 MG/1
10 TABLET ORAL DAILY
Qty: 30 TABLET | Refills: 5 | Status: SHIPPED | OUTPATIENT
Start: 2017-08-17 | End: 2017-11-20

## 2017-08-17 NOTE — PROGRESS NOTES
Subjective   Albino Estrella is a 66 y.o. female.     History of Present Illness     Dysuria  She gives a several week history of initial dysuria associated with mild frequency, and vaginal pruritus.  There is no history of any urgency, vaginal discharge, fever or chills.  She has not been on any recent antibiotics    COPD  The patient reports that her SOB, cough and wheezing have remained stable since last here. She states that these symptoms occur at any time during the day or night. She reports that her symptoms become worse with activity. Her symptoms are reduced after using  her albuterol/ipratroprium nebulizer.  She is using this 3-4 times daily. The patient states she also has nasal congestion and post nasal drip/clearing throat. She is following the treatment plan, including medications as directed. She currently smokes approximately 1 packs per day.    Hypertension  Home blood pressure readings: not doing. Associated signs and symptoms: dyspnea. Patient denies: chest pain, palpitations, orthopnea, paroxysmal nocturnal dyspnea and peripheral edema. Current antihypertensive medications includes diltiazem and hydrochlorothiazide. Medication compliance: taking as prescribed.  She has had no recent labs    Dyslipidemia  Compliance with treatment has been fair. The patient exercises intermittently. She is currently being prescribed the following medication for her dyslipidemia - atorvastatin. Patient denies side effects associated with her medications.     GERD  Patient has a history of of heartburn and regurgitation. Symptoms have been present for a number years . Symptoms include abdominal bloating. The patient denies dysphagia, hematemesis and melena. Symptoms appear to be worsened by large meals and lying down. Risk factors present for GERD include tobacco abuse and caffeine use. Risk factors absent for GERD are tobacco abuse and caffeine use. Studies performed so far include none. Treatments tried so far  include lifestyle change including caffeine reduction, dietary changes, elevate HOB, tobacco cessation, proton pump inhibitor: pantoprazole. Results of treatment: almost no occurrences of symptoms provided she takes this twice daily. Currently, the symptoms are intermittent and occur approximately 1 times per month    Osteoporosis  DEXA performed on 3/13/17 returned with a T score of -4.2.  Started on risedronate at her last visit and has been taking this as prescribed.  With the exception of intermittent headaches for a day or two following each dose she denies any apparent side effects    The following portions of the patient's history were reviewed and updated as appropriate: allergies, current medications, past medical history, past social history and problem list.    Review of Systems   Constitutional: Positive for fatigue. Negative for appetite change, chills, fever and unexpected weight change.   HENT: Positive for congestion, postnasal drip and sinus pressure. Negative for ear pain, sneezing, sore throat and voice change.    Eyes: Negative for visual disturbance.   Respiratory: Positive for cough, shortness of breath and wheezing. Negative for stridor.    Cardiovascular: Negative for chest pain, palpitations and leg swelling.   Gastrointestinal: Negative for abdominal pain, blood in stool, constipation, diarrhea, nausea and vomiting.   Endocrine: Negative for polydipsia.   Genitourinary: Positive for dysuria, frequency and vaginal pain (pruritus). Negative for difficulty urinating, hematuria, menstrual problem, pelvic pain, urgency, vaginal bleeding and vaginal discharge.   Musculoskeletal: Negative for arthralgias, back pain, joint swelling, myalgias and neck pain.   Skin: Negative for color change.   Neurological: Positive for dizziness (intermittent - responds to meclizine) and headaches (chronic - intermittent - stable). Negative for tremors, weakness and numbness.   Psychiatric/Behavioral: Negative for  dysphoric mood, sleep disturbance and suicidal ideas. The patient is not nervous/anxious.      Objective   Physical Exam   Constitutional: She is oriented to person, place, and time. No distress.   Bright and in good spirits. No apparent distress. No pallor, jaundice, diaphoresis, or cyanosis.     HENT:   Head: Atraumatic.   Right Ear: External ear normal.   Left Ear: External ear normal.   Nose: Nose normal.   Mouth/Throat: Oropharynx is clear and moist. Mucous membranes are not pale and not cyanotic.   Eyes: EOM are normal. Pupils are equal, round, and reactive to light. No scleral icterus.   Neck: No JVD present. Carotid bruit is not present. No tracheal deviation present. No thyromegaly present.   Cardiovascular: Normal rate, regular rhythm, S1 normal, S2 normal and intact distal pulses.  Exam reveals no gallop, no S3 and no S4.    No murmur heard.  Pulmonary/Chest: No accessory muscle usage or stridor. No respiratory distress. She has decreased breath sounds. She has wheezes (mild).   Abdominal: Soft. Normal aorta and bowel sounds are normal. She exhibits no distension, no abdominal bruit and no mass. There is no hepatosplenomegaly. There is no tenderness. No hernia.   Musculoskeletal: She exhibits no tenderness or deformity.       Vascular Status -  Her exam exhibits no right foot edema. Her exam exhibits no left foot edema.  Lymphadenopathy:        Head (right side): No submandibular adenopathy present.        Head (left side): No submandibular adenopathy present.     She has no cervical adenopathy.   Neurological: She is alert and oriented to person, place, and time. She has normal reflexes. She displays normal reflexes. No cranial nerve deficit. She exhibits normal muscle tone. Coordination normal.   Skin: Skin is warm and dry. No rash noted. She is not diaphoretic. No cyanosis. No pallor. Nails show no clubbing.   Psychiatric: She has a normal mood and affect.     Assessment/Plan   Problems Addressed this  Visit        Cardiovascular and Mediastinum    Mixed hyperlipidemia  Encouraged to continue to work on her diet and exercise plan.  Continue current medication   Fasting lab work will be updated just prior to her return in 3 months     Relevant Medications    atorvastatin (LIPITOR) 40 MG tablet    Other Relevant Orders    Lipid Panel    TSH    Essential hypertension  Hypertension: at goal. Evidence of target organ damage: none.  Continue current medication    Relevant Medications    diltiaZEM (TIAZAC) 360 MG 24 hr capsule    hydrochlorothiazide (HYDRODIURIL) 25 MG tablet    Other Relevant Orders    CBC & Differential    Comprehensive Metabolic Panel    Migraine without aura and without status migrainosus, not intractable    Relevant Medications    SUMAtriptan (IMITREX) 100 MG tablet    diltiaZEM (TIAZAC) 360 MG 24 hr capsule       Respiratory    COPD (chronic obstructive pulmonary disease)  COPD is worsening.  Reminded of the importance of smoking cessation  Encouraged to remain as active as symptoms allow for    Relevant Medications    ipratropium-albuterol (DUONEB) 0.5-2.5 mg/mL nebulizer    montelukast (SINGULAIR) 10 MG tablet    Umeclidinium-Vilanterol (ANORO ELLIPTA) 62.5-25 MCG/INH aerosol powder        Digestive    Gastroesophageal reflux disease without esophagitis  Symptoms are currently well controlled.  Reminded regarding lifestyle modification.  Will continue current treatment.    Relevant Medications    pantoprazole (PROTONIX) 40 MG EC tablet    IBS (irritable bowel syndrome)       Musculoskeletal and Integument    Bladder instability    Relevant Medications    tolterodine LA (DETROL LA) 4 MG 24 hr capsule    Osteoporosis  Encouraged to remain active while exercising joint protection  Continue current medication    Relevant Medications    risedronate (ACTONEL) 150 MG tablet    Other Relevant Orders    Vitamin D 25 Hydroxy    Primary osteoarthritis       Genitourinary    History of Recurrent UTI     Relevant Medications    estradiol (ESTRACE) 0.1 MG/GM vaginal cream    Candidal vaginitis  Course of oral fluconazole  Encouraged to report if any worse, any new symptoms, or if not resolving over the next week    Relevant Medications    fluconazole (DIFLUCAN) 150 MG tablet       Other    Prediabetes  Will continue to monitor    Relevant Orders    Hemoglobin A1c    Healthcare maintenance  Patient remains uninterested in colon or lung cancer screening  Reminded to get a flu shot when available    Vertigo    Relevant Medications    meclizine (ANTIVERT) 25 MG tablet

## 2017-08-19 LAB
APPEARANCE UR: CLEAR
BACTERIA #/AREA URNS HPF: ABNORMAL /HPF
BACTERIA UR CULT: NORMAL
BACTERIA UR CULT: NORMAL
BILIRUB UR QL STRIP: NEGATIVE
CASTS URNS MICRO: ABNORMAL
COLOR UR: YELLOW
EPI CELLS #/AREA URNS HPF: ABNORMAL /HPF
GLUCOSE UR QL: NEGATIVE
HGB UR QL STRIP: NEGATIVE
KETONES UR QL STRIP: NEGATIVE
LEUKOCYTE ESTERASE UR QL STRIP: (no result)
NITRITE UR QL STRIP: NEGATIVE
PH UR STRIP: 6.5 [PH] (ref 5–8)
PROT UR QL STRIP: NEGATIVE
RBC #/AREA URNS HPF: ABNORMAL /HPF
SP GR UR: 1.01 (ref 1–1.03)
UROBILINOGEN UR STRIP-MCNC: (no result) MG/DL
WBC #/AREA URNS HPF: ABNORMAL /HPF

## 2017-08-24 RX ORDER — FLUCONAZOLE 150 MG/1
150 TABLET ORAL DAILY
Qty: 5 TABLET | Refills: 0 | Status: SHIPPED | OUTPATIENT
Start: 2017-08-24 | End: 2017-11-20 | Stop reason: SDUPTHER

## 2017-11-20 ENCOUNTER — OFFICE VISIT (OUTPATIENT)
Dept: FAMILY MEDICINE CLINIC | Facility: CLINIC | Age: 67
End: 2017-11-20

## 2017-11-20 VITALS
BODY MASS INDEX: 20.8 KG/M2 | RESPIRATION RATE: 12 BRPM | OXYGEN SATURATION: 96 % | DIASTOLIC BLOOD PRESSURE: 70 MMHG | TEMPERATURE: 98.2 F | HEART RATE: 91 BPM | WEIGHT: 113 LBS | SYSTOLIC BLOOD PRESSURE: 120 MMHG | HEIGHT: 62 IN

## 2017-11-20 DIAGNOSIS — N32.89 BLADDER INSTABILITY: ICD-10-CM

## 2017-11-20 DIAGNOSIS — Z00.00 HEALTHCARE MAINTENANCE: ICD-10-CM

## 2017-11-20 DIAGNOSIS — G43.009 MIGRAINE WITHOUT AURA AND WITHOUT STATUS MIGRAINOSUS, NOT INTRACTABLE: ICD-10-CM

## 2017-11-20 DIAGNOSIS — K58.9 IRRITABLE BOWEL SYNDROME, UNSPECIFIED TYPE: ICD-10-CM

## 2017-11-20 DIAGNOSIS — R30.0 DYSURIA: ICD-10-CM

## 2017-11-20 DIAGNOSIS — E78.2 MIXED HYPERLIPIDEMIA: Primary | ICD-10-CM

## 2017-11-20 DIAGNOSIS — R73.03 PREDIABETES: ICD-10-CM

## 2017-11-20 DIAGNOSIS — J44.9 CHRONIC OBSTRUCTIVE PULMONARY DISEASE, UNSPECIFIED COPD TYPE (HCC): ICD-10-CM

## 2017-11-20 DIAGNOSIS — N39.0 RECURRENT UTI: ICD-10-CM

## 2017-11-20 DIAGNOSIS — M15.9 PRIMARY OSTEOARTHRITIS INVOLVING MULTIPLE JOINTS: ICD-10-CM

## 2017-11-20 DIAGNOSIS — K21.9 GASTROESOPHAGEAL REFLUX DISEASE WITHOUT ESOPHAGITIS: ICD-10-CM

## 2017-11-20 DIAGNOSIS — M81.0 OSTEOPOROSIS: ICD-10-CM

## 2017-11-20 DIAGNOSIS — Z23 ENCOUNTER FOR IMMUNIZATION: ICD-10-CM

## 2017-11-20 DIAGNOSIS — M80.00XA AGE-RELATED OSTEOPOROSIS WITH CURRENT PATHOLOGICAL FRACTURE, INITIAL ENCOUNTER: ICD-10-CM

## 2017-11-20 DIAGNOSIS — I10 ESSENTIAL HYPERTENSION: ICD-10-CM

## 2017-11-20 DIAGNOSIS — Z87.440 H/O RECURRENT URINARY TRACT INFECTION: ICD-10-CM

## 2017-11-20 PROBLEM — B37.31 CANDIDAL VAGINITIS: Status: RESOLVED | Noted: 2017-08-17 | Resolved: 2017-11-20

## 2017-11-20 LAB
BILIRUB BLD-MCNC: NEGATIVE MG/DL
CLARITY, POC: CLEAR
COLOR UR: YELLOW
GLUCOSE UR STRIP-MCNC: NEGATIVE MG/DL
KETONES UR QL: ABNORMAL
LEUKOCYTE EST, POC: NEGATIVE
NITRITE UR-MCNC: NEGATIVE MG/ML
PH UR: 6 [PH] (ref 5–8)
PROT UR STRIP-MCNC: ABNORMAL MG/DL
RBC # UR STRIP: NEGATIVE /UL
SP GR UR: 1.02 (ref 1–1.03)
UROBILINOGEN UR QL: ABNORMAL

## 2017-11-20 PROCEDURE — 99214 OFFICE O/P EST MOD 30 MIN: CPT | Performed by: GENERAL PRACTICE

## 2017-11-20 PROCEDURE — 81003 URINALYSIS AUTO W/O SCOPE: CPT | Performed by: GENERAL PRACTICE

## 2017-11-20 PROCEDURE — 90686 IIV4 VACC NO PRSV 0.5 ML IM: CPT | Performed by: GENERAL PRACTICE

## 2017-11-20 PROCEDURE — G0008 ADMIN INFLUENZA VIRUS VAC: HCPCS | Performed by: GENERAL PRACTICE

## 2017-11-20 RX ORDER — PANTOPRAZOLE SODIUM 40 MG/1
40 TABLET, DELAYED RELEASE ORAL 2 TIMES DAILY
Qty: 60 TABLET | Refills: 5 | Status: SHIPPED | OUTPATIENT
Start: 2017-11-20 | End: 2018-05-08 | Stop reason: SDUPTHER

## 2017-11-20 RX ORDER — CIPROFLOXACIN 250 MG/1
250 TABLET, FILM COATED ORAL 2 TIMES DAILY
Qty: 20 TABLET | Refills: 0 | Status: SHIPPED | OUTPATIENT
Start: 2017-11-20 | End: 2017-11-30

## 2017-11-20 RX ORDER — ATORVASTATIN CALCIUM 40 MG/1
40 TABLET, FILM COATED ORAL NIGHTLY
Qty: 30 TABLET | Refills: 5 | Status: SHIPPED | OUTPATIENT
Start: 2017-11-20 | End: 2018-05-08 | Stop reason: SDUPTHER

## 2017-11-20 RX ORDER — RISEDRONATE SODIUM 150 MG/1
150 TABLET, FILM COATED ORAL
Qty: 1 TABLET | Refills: 5 | Status: SHIPPED | OUTPATIENT
Start: 2017-11-20 | End: 2018-05-08 | Stop reason: SDUPTHER

## 2017-11-20 RX ORDER — ESTRADIOL 0.1 MG/G
2 CREAM VAGINAL 2 TIMES WEEKLY
Qty: 20 G | Refills: 5 | Status: SHIPPED | OUTPATIENT
Start: 2017-11-20 | End: 2018-05-08 | Stop reason: SDUPTHER

## 2017-11-20 RX ORDER — SUMATRIPTAN 100 MG/1
100 TABLET, FILM COATED ORAL ONCE AS NEEDED
Qty: 9 TABLET | Refills: 5 | Status: SHIPPED | OUTPATIENT
Start: 2017-11-20 | End: 2018-05-08 | Stop reason: SDUPTHER

## 2017-11-20 RX ORDER — DILTIAZEM HYDROCHLORIDE 360 MG/1
360 CAPSULE, EXTENDED RELEASE ORAL DAILY
Qty: 30 CAPSULE | Refills: 5 | Status: SHIPPED | OUTPATIENT
Start: 2017-11-20 | End: 2018-05-08 | Stop reason: SDUPTHER

## 2017-11-20 RX ORDER — LANOLIN ALCOHOL/MO/W.PET/CERES
1000 CREAM (GRAM) TOPICAL DAILY
Qty: 30 TABLET | Refills: 5 | Status: SHIPPED | OUTPATIENT
Start: 2017-11-20 | End: 2018-05-08 | Stop reason: SDUPTHER

## 2017-11-20 RX ORDER — HYDROCHLOROTHIAZIDE 25 MG/1
25 TABLET ORAL DAILY
Qty: 30 TABLET | Refills: 5 | Status: SHIPPED | OUTPATIENT
Start: 2017-11-20 | End: 2018-05-08 | Stop reason: SDUPTHER

## 2017-11-20 RX ORDER — FLUCONAZOLE 150 MG/1
150 TABLET ORAL DAILY
Qty: 5 TABLET | Refills: 0 | Status: SHIPPED | OUTPATIENT
Start: 2017-11-20 | End: 2018-03-27 | Stop reason: SDUPTHER

## 2017-11-20 RX ORDER — IPRATROPIUM BROMIDE AND ALBUTEROL SULFATE 2.5; .5 MG/3ML; MG/3ML
3 SOLUTION RESPIRATORY (INHALATION) EVERY 4 HOURS PRN
Qty: 540 ML | Refills: 5 | Status: SHIPPED | OUTPATIENT
Start: 2017-11-20 | End: 2018-05-08 | Stop reason: SDUPTHER

## 2017-11-20 RX ORDER — TOLTERODINE 4 MG/1
4 CAPSULE, EXTENDED RELEASE ORAL NIGHTLY
Qty: 30 CAPSULE | Refills: 5 | Status: SHIPPED | OUTPATIENT
Start: 2017-11-20 | End: 2018-05-08 | Stop reason: SDUPTHER

## 2017-11-20 NOTE — PROGRESS NOTES
Subjective   Albino Estrella is a 66 y.o. female.     History of Present Illness     Dysuria  Since last year she is continued to experience intermittent dysuria associated with mild frequency, urgency and suprapubic pressure.  There is no history of any hematuria, vaginal discharge or pruritus, fever or chills.      COPD  The patient reports that her SOB, cough and wheezing have largely remained stable since last here. She states that these symptoms occur at any time during the day or night. She reports that her symptoms become worse with activity. Her symptoms are reduced after using  her albuterol/ipratroprium nebulizer.  She is using this 2-3 times daily. The patient states she also has nasal congestion and post nasal drip/clearing throat. She is following the treatment plan, including medications as directed. She currently smokes approximately 1 packs per day.    Hypertension  Home blood pressure readings: not doing. Associated signs and symptoms: dyspnea. Patient denies: chest pain, palpitations, orthopnea, paroxysmal nocturnal dyspnea and peripheral edema. Current antihypertensive medications includes diltiazem and hydrochlorothiazide. Medication compliance: taking as prescribed.  She has yet to follow up with the labs arranged at her last visit    Dyslipidemia  Compliance with treatment has been fair. The patient exercises intermittently. She is currently being prescribed the following medication for her dyslipidemia - atorvastatin. Patient denies side effects associated with her medications.     GERD  Patient has a history of of heartburn and regurgitation. Symptoms have been present for a number years . Symptoms include abdominal bloating. The patient denies dysphagia, hematemesis and melena. Symptoms appear to be worsened by large meals and lying down. Risk factors present for GERD include tobacco abuse and caffeine use. Risk factors absent for GERD are tobacco abuse and caffeine use. Studies performed so  far include none. Treatments tried so far include lifestyle change including caffeine reduction, dietary changes, elevate HOB, tobacco cessation, proton pump inhibitor: pantoprazole. Results of treatment: almost no occurrences of symptoms provided she takes this twice daily. Currently, the symptoms remain intermittent and occur approximately 1 times per month    Osteoporosis  DEXA performed on 3/13/17 returned with a T score of -4.2.  Started on risedronate afterward and has been taking this as prescribed.  With the exception of intermittent headaches for a day or two following each dose she continues to deny any apparent side effects    The following portions of the patient's history were reviewed and updated as appropriate: allergies, current medications, past medical history, past social history and problem list.    Review of Systems   Constitutional: Positive for fatigue. Negative for appetite change, chills, fever and unexpected weight change.   HENT: Positive for congestion, postnasal drip and sinus pressure. Negative for ear pain, sneezing, sore throat and voice change.    Eyes: Negative for visual disturbance.   Respiratory: Positive for cough, shortness of breath and wheezing. Negative for stridor.    Cardiovascular: Negative for chest pain, palpitations and leg swelling.   Gastrointestinal: Negative for abdominal pain, blood in stool, constipation, diarrhea, nausea and vomiting.   Endocrine: Negative for polydipsia.   Genitourinary: Positive for dysuria, frequency, pelvic pain (suprapubic pressure) and urgency. Negative for difficulty urinating, hematuria, menstrual problem, vaginal bleeding, vaginal discharge and vaginal pain.   Musculoskeletal: Negative for arthralgias, back pain, joint swelling, myalgias and neck pain.   Skin: Negative for color change.   Neurological: Positive for dizziness (intermittent - responds to meclizine) and headaches (chronic - intermittent - stable). Negative for tremors,  weakness and numbness.   Psychiatric/Behavioral: Negative for dysphoric mood, sleep disturbance and suicidal ideas. The patient is not nervous/anxious.      Objective   Physical Exam   Constitutional: She is oriented to person, place, and time. No distress.   Bright and in good spirits. No apparent distress. No pallor, jaundice, diaphoresis, or cyanosis.     HENT:   Head: Atraumatic.   Right Ear: External ear normal.   Left Ear: External ear normal.   Nose: Nose normal.   Mouth/Throat: Oropharynx is clear and moist. Mucous membranes are not pale and not cyanotic.   Eyes: EOM are normal. Pupils are equal, round, and reactive to light. No scleral icterus.   Neck: No JVD present. Carotid bruit is not present. No tracheal deviation present. No thyromegaly present.   Cardiovascular: Normal rate, regular rhythm, S1 normal, S2 normal and intact distal pulses.  Exam reveals no gallop, no S3 and no S4.    No murmur heard.  Pulmonary/Chest: No accessory muscle usage or stridor. No respiratory distress. She has decreased breath sounds. She has wheezes (mild).   Abdominal: Soft. Normal aorta and bowel sounds are normal. She exhibits no distension, no abdominal bruit and no mass. There is no hepatosplenomegaly. There is no tenderness. No hernia.   Musculoskeletal: She exhibits no tenderness or deformity.       Vascular Status -  Her exam exhibits no right foot edema. Her exam exhibits no left foot edema.  Lymphadenopathy:        Head (right side): No submandibular adenopathy present.        Head (left side): No submandibular adenopathy present.     She has no cervical adenopathy.   Neurological: She is alert and oriented to person, place, and time. She has normal reflexes. She displays normal reflexes. No cranial nerve deficit. She exhibits normal muscle tone. Coordination normal.   Skin: Skin is warm and dry. No rash noted. She is not diaphoretic. No cyanosis. No pallor. Nails show no clubbing.   Psychiatric: She has a normal  mood and affect.     Assessment/Plan   Problems Addressed this Visit        Cardiovascular and Mediastinum    Mixed hyperlipidemia  Encouraged to continue to work on her diet and exercise plan.  Continue current medication  Previously scheduled lab drawn    Relevant Medications    atorvastatin (LIPITOR) 40 MG tablet    Essential hypertension  As above.   Continue current medication.    Relevant Medications    hydrochlorothiazide (HYDRODIURIL) 25 MG tablet    diltiaZEM (TIAZAC) 360 MG 24 hr capsule    Migraine without aura and without status migrainosus, not intractable    Relevant Medications    SUMAtriptan (IMITREX) 100 MG tablet    diltiaZEM (TIAZAC) 360 MG 24 hr capsule       Respiratory    COPD (chronic obstructive pulmonary disease)  COPD is worsening.  Reminded of the importance of smoking cessation  Encouraged to remain as active as symptoms allow for    Relevant Medications    Umeclidinium-Vilanterol (ANORO ELLIPTA) 62.5-25 MCG/INH aerosol powder     ipratropium-albuterol (DUONEB) 0.5-2.5 mg/mL nebulizer       Digestive    Gastroesophageal reflux disease without esophagitis  Symptoms are currently well controlled.  Reminded regarding lifestyle modification.  Continue current medication    Relevant Medications    pantoprazole (PROTONIX) 40 MG EC tablet    IBS (irritable bowel syndrome)       Musculoskeletal and Integument    Bladder instability    Relevant Medications    tolterodine LA (DETROL LA) 4 MG 24 hr capsule    Osteoporosis  Encouraged to continue to pursue weightbearing activities while exercising joint protection  Continue current medication    Relevant Medications    risedronate (ACTONEL) 150 MG tablet    Primary osteoarthritis       Other    H/O recurrent urinary tract infection  Will start on ciprofloxacin while awaiting the results of today's urine culture  Recommended a urology reassessment.  Patient would like to consider    Relevant Medications    ciprofloxacin (CIPRO) 250 MG tablet     estradiol (ESTRACE) 0.1 MG/GM vaginal cream    Prediabetes  Will continue to monitor    Encounter for immunization    Relevant Orders    Flu Vaccine Quad PF 3YR+ (FLUARIX/FLUZONE 1568-2513) (Completed)    Healthcare maintenance  Recommended a flu shot  Hepatitis C screen will be performed with today's labs   Patient remains uninterested in colon or lung cancer screening

## 2017-11-21 LAB
25(OH)D3+25(OH)D2 SERPL-MCNC: 30 NG/ML
ALBUMIN SERPL-MCNC: 4.6 G/DL (ref 3.4–4.8)
ALBUMIN/GLOB SERPL: 1.4 G/DL (ref 1.5–2.5)
ALP SERPL-CCNC: 85 U/L (ref 35–104)
ALT SERPL-CCNC: 15 U/L (ref 10–36)
AST SERPL-CCNC: 20 U/L (ref 10–30)
BASOPHILS # BLD AUTO: 0.05 10*3/MM3 (ref 0–0.3)
BASOPHILS NFR BLD AUTO: 0.5 % (ref 0–2)
BILIRUB SERPL-MCNC: 0.4 MG/DL (ref 0.2–1.8)
BUN SERPL-MCNC: 10 MG/DL (ref 7–21)
BUN/CREAT SERPL: 11.4 (ref 7–25)
CALCIUM SERPL-MCNC: 9.1 MG/DL (ref 7.7–10)
CHLORIDE SERPL-SCNC: 111 MMOL/L (ref 99–112)
CHOLEST SERPL-MCNC: 192 MG/DL (ref 0–200)
CO2 SERPL-SCNC: 25.3 MMOL/L (ref 24.3–31.9)
CREAT SERPL-MCNC: 0.88 MG/DL (ref 0.43–1.29)
EOSINOPHIL # BLD AUTO: 0.14 10*3/MM3 (ref 0–0.7)
EOSINOPHIL NFR BLD AUTO: 1.5 % (ref 0–7)
ERYTHROCYTE [DISTWIDTH] IN BLOOD BY AUTOMATED COUNT: 13.6 % (ref 11.5–14.5)
GFR SERPLBLD CREATININE-BSD FMLA CKD-EPI: 64 ML/MIN/1.73
GFR SERPLBLD CREATININE-BSD FMLA CKD-EPI: 78 ML/MIN/1.73
GLOBULIN SER CALC-MCNC: 3.3 GM/DL
GLUCOSE SERPL-MCNC: 109 MG/DL (ref 70–110)
HBA1C MFR BLD: 5.4 % (ref 4.5–5.7)
HCT VFR BLD AUTO: 41.4 % (ref 37–47)
HCV AB S/CO SERPL IA: <0.1 S/CO RATIO (ref 0–0.9)
HDLC SERPL-MCNC: 72 MG/DL (ref 60–100)
HGB BLD-MCNC: 14.1 G/DL (ref 12–16)
IMM GRANULOCYTES # BLD: 0.02 10*3/MM3 (ref 0–0.03)
IMM GRANULOCYTES NFR BLD: 0.2 % (ref 0–0.5)
LDLC SERPL CALC-MCNC: 95 MG/DL (ref 0–100)
LYMPHOCYTES # BLD AUTO: 3.78 10*3/MM3 (ref 1–3)
LYMPHOCYTES NFR BLD AUTO: 39.7 % (ref 16–46)
MCH RBC QN AUTO: 33.2 PG (ref 27–33)
MCHC RBC AUTO-ENTMCNC: 34.1 G/DL (ref 33–37)
MCV RBC AUTO: 97.4 FL (ref 80–94)
MONOCYTES # BLD AUTO: 0.6 10*3/MM3 (ref 0.1–0.9)
MONOCYTES NFR BLD AUTO: 6.3 % (ref 0–12)
NEUTROPHILS # BLD AUTO: 4.93 10*3/MM3 (ref 1.4–6.5)
NEUTROPHILS NFR BLD AUTO: 51.8 % (ref 40–75)
PLATELET # BLD AUTO: 241 10*3/MM3 (ref 130–400)
POTASSIUM SERPL-SCNC: 4 MMOL/L (ref 3.5–5.3)
PROT SERPL-MCNC: 7.9 G/DL (ref 6–8)
RBC # BLD AUTO: 4.25 10*6/MM3 (ref 4.2–5.4)
SODIUM SERPL-SCNC: 140 MMOL/L (ref 135–153)
TRIGL SERPL-MCNC: 124 MG/DL (ref 0–150)
TSH SERPL DL<=0.005 MIU/L-ACNC: 5.87 MIU/ML (ref 0.55–4.78)
VLDLC SERPL CALC-MCNC: 24.8 MG/DL
WBC # BLD AUTO: 9.52 10*3/MM3 (ref 4.5–12.5)

## 2017-11-23 LAB
BACTERIA UR CULT: ABNORMAL
BACTERIA UR CULT: ABNORMAL
OTHER ANTIBIOTIC SUSC ISLT: ABNORMAL

## 2018-01-15 RX ORDER — SULFAMETHOXAZOLE AND TRIMETHOPRIM 800; 160 MG/1; MG/1
1 TABLET ORAL 2 TIMES DAILY
Qty: 20 TABLET | Refills: 0 | Status: SHIPPED | OUTPATIENT
Start: 2018-01-15 | End: 2018-01-25

## 2018-02-22 ENCOUNTER — OFFICE VISIT (OUTPATIENT)
Dept: FAMILY MEDICINE CLINIC | Facility: CLINIC | Age: 68
End: 2018-02-22

## 2018-02-22 VITALS
HEART RATE: 104 BPM | BODY MASS INDEX: 20.98 KG/M2 | RESPIRATION RATE: 12 BRPM | OXYGEN SATURATION: 95 % | HEIGHT: 62 IN | DIASTOLIC BLOOD PRESSURE: 70 MMHG | WEIGHT: 114 LBS | TEMPERATURE: 98.6 F | SYSTOLIC BLOOD PRESSURE: 130 MMHG

## 2018-02-22 DIAGNOSIS — E78.2 MIXED HYPERLIPIDEMIA: Primary | ICD-10-CM

## 2018-02-22 DIAGNOSIS — K21.9 GASTROESOPHAGEAL REFLUX DISEASE WITHOUT ESOPHAGITIS: ICD-10-CM

## 2018-02-22 DIAGNOSIS — G43.009 MIGRAINE WITHOUT AURA AND WITHOUT STATUS MIGRAINOSUS, NOT INTRACTABLE: ICD-10-CM

## 2018-02-22 DIAGNOSIS — Z87.891 PERSONAL HISTORY OF NICOTINE DEPENDENCE: ICD-10-CM

## 2018-02-22 DIAGNOSIS — Z87.440 H/O RECURRENT URINARY TRACT INFECTION: ICD-10-CM

## 2018-02-22 DIAGNOSIS — R73.03 PREDIABETES: ICD-10-CM

## 2018-02-22 DIAGNOSIS — M15.9 PRIMARY OSTEOARTHRITIS INVOLVING MULTIPLE JOINTS: ICD-10-CM

## 2018-02-22 DIAGNOSIS — M80.00XA AGE-RELATED OSTEOPOROSIS WITH CURRENT PATHOLOGICAL FRACTURE, INITIAL ENCOUNTER: ICD-10-CM

## 2018-02-22 DIAGNOSIS — I10 ESSENTIAL HYPERTENSION: ICD-10-CM

## 2018-02-22 DIAGNOSIS — K58.8 OTHER IRRITABLE BOWEL SYNDROME: ICD-10-CM

## 2018-02-22 DIAGNOSIS — N32.89 BLADDER INSTABILITY: ICD-10-CM

## 2018-02-22 DIAGNOSIS — Z00.00 HEALTHCARE MAINTENANCE: ICD-10-CM

## 2018-02-22 DIAGNOSIS — F17.200 SMOKER: ICD-10-CM

## 2018-02-22 DIAGNOSIS — Z12.31 ENCOUNTER FOR SCREENING MAMMOGRAM FOR BREAST CANCER: ICD-10-CM

## 2018-02-22 DIAGNOSIS — J44.9 CHRONIC OBSTRUCTIVE PULMONARY DISEASE, UNSPECIFIED COPD TYPE (HCC): ICD-10-CM

## 2018-02-22 LAB
BILIRUB BLD-MCNC: NEGATIVE MG/DL
CLARITY, POC: CLEAR
COLOR UR: NORMAL
GLUCOSE UR STRIP-MCNC: NEGATIVE MG/DL
KETONES UR QL: NEGATIVE
LEUKOCYTE EST, POC: NEGATIVE
NITRITE UR-MCNC: NEGATIVE MG/ML
PH UR: 6 [PH] (ref 5–8)
PROT UR STRIP-MCNC: NEGATIVE MG/DL
RBC # UR STRIP: NEGATIVE /UL
SP GR UR: 1.01 (ref 1–1.03)
UROBILINOGEN UR QL: NORMAL

## 2018-02-22 PROCEDURE — 81003 URINALYSIS AUTO W/O SCOPE: CPT | Performed by: GENERAL PRACTICE

## 2018-02-22 PROCEDURE — 99214 OFFICE O/P EST MOD 30 MIN: CPT | Performed by: GENERAL PRACTICE

## 2018-02-22 NOTE — PROGRESS NOTES
Subjective   Albino Estrella is a 67 y.o. female.     History of Present Illness     COPD  The patient reports that her SOB, cough and wheezing have remained stable since last here. She states that these symptoms occur at any time during the day or night. She reports that her symptoms become worse with activity. Her symptoms are reduced after using  her albuterol/ipratroprium nebulizer.  She is using this 2-3 times daily. The patient states she also has nasal congestion and post nasal drip/clearing throat. She is following the treatment plan, including medications as directed. She currently smokes approximately 1 packs per day.    Hypertension  Home blood pressure readings: not doing. Associated signs and symptoms: dyspnea. Patient denies: chest pain, palpitations, orthopnea, paroxysmal nocturnal dyspnea and peripheral edema. Current antihypertensive medications includes diltiazem and hydrochlorothiazide. Medication compliance: taking as prescribed.    Lab Results   Component Value Date    CREATININE 0.88 11/20/2017     Dyslipidemia  Compliance with treatment has been fair. The patient exercises intermittently. She is currently being prescribed the following medication for her dyslipidemia - atorvastatin. Patient denies side effects associated with her medications.   Lab Results   Component Value Date    CHOL 197 01/12/2017    CHLPL 192 11/20/2017    TRIG 124 11/20/2017    HDL 72 11/20/2017    LDL 95 11/20/2017     GERD  Patient has a history of of heartburn and regurgitation. Symptoms have been present for a number years . Symptoms include abdominal bloating. The patient denies dysphagia, hematemesis and melena. Symptoms appear to be worsened by large meals and lying down. Risk factors present for GERD include tobacco abuse and caffeine use. Risk factors absent for GERD are tobacco abuse and caffeine use. Studies performed so far include none. Treatments tried so far include lifestyle change including caffeine  reduction, dietary changes, elevate HOB, tobacco cessation, proton pump inhibitor: pantoprazole. Results of treatment: almost no occurrences of symptoms provided she takes this twice daily. Currently, the symptoms remain intermittent and occur approximately 1 times per month    Labs  Most recent vitamin D 30    The following portions of the patient's history were reviewed and updated as appropriate: allergies, current medications, past medical history, past social history and problem list.    Review of Systems   Constitutional: Positive for fatigue. Negative for appetite change, chills, fever and unexpected weight change.   HENT: Negative for congestion, ear pain, postnasal drip, sinus pressure, sneezing, sore throat and voice change.    Eyes: Negative for visual disturbance.   Respiratory: Positive for cough, shortness of breath and wheezing. Negative for stridor.    Cardiovascular: Negative for chest pain, palpitations and leg swelling.   Gastrointestinal: Negative for abdominal pain, blood in stool, constipation, diarrhea, nausea and vomiting.   Endocrine: Negative for polydipsia.   Genitourinary: Positive for dysuria (intermittent). Negative for difficulty urinating, frequency, hematuria, menstrual problem, pelvic pain, urgency, vaginal bleeding and vaginal discharge.   Musculoskeletal: Negative for arthralgias, back pain, joint swelling, myalgias and neck pain.   Skin: Negative for color change.   Neurological: Positive for dizziness (chronic-intermittent - responds to meclizine) and headaches (chronic - intermittent - stable). Negative for tremors, weakness and numbness.   Psychiatric/Behavioral: Negative for dysphoric mood, sleep disturbance and suicidal ideas. The patient is not nervous/anxious.      Objective   Physical Exam   Constitutional: She is oriented to person, place, and time. No distress.   Bright and in good spirits. No apparent distress. No pallor, jaundice, diaphoresis, or cyanosis.     HENT:    Head: Atraumatic.   Right Ear: External ear normal.   Left Ear: External ear normal.   Nose: Nose normal.   Mouth/Throat: Oropharynx is clear and moist. Mucous membranes are not pale and not cyanotic.   Eyes: EOM are normal. Pupils are equal, round, and reactive to light. No scleral icterus.   Neck: No JVD present. Carotid bruit is not present. No tracheal deviation present. No thyromegaly present.   Cardiovascular: Normal rate, regular rhythm, S1 normal, S2 normal and intact distal pulses.  Exam reveals no gallop, no S3 and no S4.    No murmur heard.  Pulmonary/Chest: No accessory muscle usage or stridor. No respiratory distress. She has decreased breath sounds. She has wheezes (mild).   Abdominal: Soft. Normal aorta and bowel sounds are normal. She exhibits no distension, no abdominal bruit and no mass. There is no hepatosplenomegaly. There is no tenderness. No hernia.   Musculoskeletal: She exhibits no tenderness or deformity.       Vascular Status -  Her exam exhibits no right foot edema. Her exam exhibits no left foot edema.  Lymphadenopathy:        Head (right side): No submandibular adenopathy present.        Head (left side): No submandibular adenopathy present.     She has no cervical adenopathy.   Neurological: She is alert and oriented to person, place, and time. She has normal reflexes. She displays normal reflexes. No cranial nerve deficit. She exhibits normal muscle tone. Coordination normal.   Skin: Skin is warm and dry. No rash noted. She is not diaphoretic. No cyanosis. No pallor. Nails show no clubbing.   Psychiatric: She has a normal mood and affect.     Assessment/Plan   Problems Addressed this Visit        Cardiovascular and Mediastinum    Mixed hyperlipidemia  Encouraged to continue to work on her diet and exercise plan.  Continue current medication    Essential hypertension  Hypertension: at goal. Evidence of target organ damage: none.  Continue current medication    Migraine without aura  and without status migrainosus, not intractable       Respiratory    COPD (chronic obstructive pulmonary disease)  COPD is worsening.  Reminded of the importance of smoking cessation  Encouraged to remain as active as symptoms allow for  Given samples of trelegy to try in place of anoro for 6 weeks        Digestive    Gastroesophageal reflux disease without esophagitis    IBS (irritable bowel syndrome)       Musculoskeletal and Integument    Bladder instability    Osteoporosis    Primary osteoarthritis       Other    H/O recurrent urinary tract infection    Relevant Orders    POC Urinalysis Dipstick, Automated (Completed)    Prediabetes  Will continue to monitor    Healthcare maintenance  Reviewed the potential benefits of shingrix. Prescription written.  We'll arrange an updated mammogram.  Patient would like to go ahead with a low-dose CT of the chest at the same time     Relevant Medications    Zoster Vac Recomb Adjuvanted (SHINGRIX) 50 MCG reconstituted suspension    Other Relevant Orders    Mammo Screening Digital Tomosynthesis Bilateral With CAD    CT Chest Low Dose Wo    Encounter for screening mammogram for breast cancer    Relevant Orders    Mammo Screening Digital Tomosynthesis Bilateral With CAD    Smoker    Relevant Orders    CT Chest Low Dose Wo

## 2018-03-27 RX ORDER — SULFAMETHOXAZOLE AND TRIMETHOPRIM 800; 160 MG/1; MG/1
1 TABLET ORAL 2 TIMES DAILY
Qty: 20 TABLET | Refills: 0 | Status: SHIPPED | OUTPATIENT
Start: 2018-03-27 | End: 2018-04-06

## 2018-03-27 RX ORDER — FLUCONAZOLE 150 MG/1
150 TABLET ORAL DAILY
Qty: 5 TABLET | Refills: 0 | Status: SHIPPED | OUTPATIENT
Start: 2018-03-27 | End: 2018-05-08

## 2018-03-28 ENCOUNTER — TELEPHONE (OUTPATIENT)
Dept: FAMILY MEDICINE CLINIC | Facility: CLINIC | Age: 68
End: 2018-03-28

## 2018-03-28 NOTE — TELEPHONE ENCOUNTER
----- Message from Stan Alvarado MD sent at 3/27/2018  7:44 PM EDT -----  Emailed script for bactrim as well as fluconazole    ----- Message -----  From: Ema Veloz Rep  Sent: 3/27/2018   2:51 PM  To: Stan Alvarado MD    [facility]    SINUS INF WITH SORE THROAT REQUESTING ANTIBIOTIC

## 2018-04-02 ENCOUNTER — HOSPITAL ENCOUNTER (OUTPATIENT)
Dept: MAMMOGRAPHY | Facility: HOSPITAL | Age: 68
Discharge: HOME | End: 2018-04-02
Admitting: GENERAL PRACTICE

## 2018-04-02 ENCOUNTER — HOSPITAL ENCOUNTER (OUTPATIENT)
Dept: CT IMAGING | Facility: HOSPITAL | Age: 68
Discharge: HOME | End: 2018-04-02

## 2018-04-02 DIAGNOSIS — Z12.31 ENCOUNTER FOR SCREENING MAMMOGRAM FOR BREAST CANCER: ICD-10-CM

## 2018-04-02 DIAGNOSIS — F17.200 SMOKER: ICD-10-CM

## 2018-04-02 DIAGNOSIS — Z00.00 HEALTHCARE MAINTENANCE: ICD-10-CM

## 2018-04-02 DIAGNOSIS — Z87.891 PERSONAL HISTORY OF NICOTINE DEPENDENCE: ICD-10-CM

## 2018-04-02 PROCEDURE — 71250 CT THORAX DX C-: CPT | Performed by: RADIOLOGY

## 2018-04-02 PROCEDURE — 77063 BREAST TOMOSYNTHESIS BI: CPT | Performed by: RADIOLOGY

## 2018-04-02 PROCEDURE — 77067 SCR MAMMO BI INCL CAD: CPT

## 2018-04-02 PROCEDURE — 77063 BREAST TOMOSYNTHESIS BI: CPT

## 2018-04-02 PROCEDURE — 77067 SCR MAMMO BI INCL CAD: CPT | Performed by: RADIOLOGY

## 2018-04-02 PROCEDURE — G0297 LDCT FOR LUNG CA SCREEN: HCPCS

## 2018-04-03 DIAGNOSIS — N28.1 CYST OF LEFT KIDNEY: ICD-10-CM

## 2018-04-03 DIAGNOSIS — R91.8 MASS OF LOWER LOBE OF RIGHT LUNG: Primary | ICD-10-CM

## 2018-04-03 NOTE — PROGRESS NOTES
Spoke with pt about the following per Dr. Alvarado.       -- Needs PET scan chest and U/S kidneys   I have placed the order for both in [facility]

## 2018-04-17 DIAGNOSIS — R91.8 MASS OF LOWER LOBE OF RIGHT LUNG: Primary | ICD-10-CM

## 2018-04-19 DIAGNOSIS — R91.1 LUNG NODULE: Primary | ICD-10-CM

## 2018-04-20 ENCOUNTER — HOSPITAL ENCOUNTER (OUTPATIENT)
Dept: PET IMAGING | Facility: HOSPITAL | Age: 68
Discharge: HOME | End: 2018-04-20

## 2018-04-20 ENCOUNTER — HOSPITAL ENCOUNTER (OUTPATIENT)
Dept: ULTRASOUND IMAGING | Facility: HOSPITAL | Age: 68
Discharge: HOME | End: 2018-04-20
Admitting: GENERAL PRACTICE

## 2018-04-20 DIAGNOSIS — R91.1 LUNG NODULE: ICD-10-CM

## 2018-04-20 DIAGNOSIS — N28.1 CYST OF LEFT KIDNEY: ICD-10-CM

## 2018-04-20 PROCEDURE — 76775 US EXAM ABDO BACK WALL LIM: CPT

## 2018-04-20 PROCEDURE — 76775 US EXAM ABDO BACK WALL LIM: CPT | Performed by: RADIOLOGY

## 2018-04-27 ENCOUNTER — HOSPITAL ENCOUNTER (OUTPATIENT)
Dept: PET IMAGING | Facility: HOSPITAL | Age: 68
Discharge: HOME | End: 2018-04-27
Admitting: GENERAL PRACTICE

## 2018-04-27 PROCEDURE — 0 FLUDEOXYGLUCOSE F18 SOLUTION: Performed by: GENERAL PRACTICE

## 2018-04-27 PROCEDURE — A9552 F18 FDG: HCPCS | Performed by: GENERAL PRACTICE

## 2018-04-27 PROCEDURE — 78815 PET IMAGE W/CT SKULL-THIGH: CPT | Performed by: RADIOLOGY

## 2018-04-27 PROCEDURE — 78815 PET IMAGE W/CT SKULL-THIGH: CPT

## 2018-04-27 RX ADMIN — FLUDEOXYGLUCOSE F18 1 DOSE: 300 INJECTION INTRAVENOUS at 09:27

## 2018-05-08 ENCOUNTER — OFFICE VISIT (OUTPATIENT)
Dept: FAMILY MEDICINE CLINIC | Facility: CLINIC | Age: 68
End: 2018-05-08

## 2018-05-08 DIAGNOSIS — Z87.440 H/O RECURRENT URINARY TRACT INFECTION: ICD-10-CM

## 2018-05-08 DIAGNOSIS — J44.9 COPD MIXED TYPE (HCC): ICD-10-CM

## 2018-05-08 DIAGNOSIS — K21.9 GASTROESOPHAGEAL REFLUX DISEASE WITHOUT ESOPHAGITIS: ICD-10-CM

## 2018-05-08 DIAGNOSIS — N39.0 RECURRENT UTI: ICD-10-CM

## 2018-05-08 DIAGNOSIS — R73.03 PREDIABETES: ICD-10-CM

## 2018-05-08 DIAGNOSIS — F17.200 SMOKER: ICD-10-CM

## 2018-05-08 DIAGNOSIS — N32.89 BLADDER INSTABILITY: ICD-10-CM

## 2018-05-08 DIAGNOSIS — K58.8 OTHER IRRITABLE BOWEL SYNDROME: ICD-10-CM

## 2018-05-08 DIAGNOSIS — M80.00XA AGE-RELATED OSTEOPOROSIS WITH CURRENT PATHOLOGICAL FRACTURE, INITIAL ENCOUNTER: ICD-10-CM

## 2018-05-08 DIAGNOSIS — M15.9 PRIMARY OSTEOARTHRITIS INVOLVING MULTIPLE JOINTS: ICD-10-CM

## 2018-05-08 DIAGNOSIS — Z00.00 HEALTHCARE MAINTENANCE: ICD-10-CM

## 2018-05-08 DIAGNOSIS — J44.9 CHRONIC OBSTRUCTIVE PULMONARY DISEASE, UNSPECIFIED COPD TYPE (HCC): ICD-10-CM

## 2018-05-08 DIAGNOSIS — R91.8 MASS OF LOWER LOBE OF RIGHT LUNG: ICD-10-CM

## 2018-05-08 DIAGNOSIS — I10 ESSENTIAL HYPERTENSION: Primary | ICD-10-CM

## 2018-05-08 DIAGNOSIS — G43.009 MIGRAINE WITHOUT AURA AND WITHOUT STATUS MIGRAINOSUS, NOT INTRACTABLE: ICD-10-CM

## 2018-05-08 DIAGNOSIS — E78.2 MIXED HYPERLIPIDEMIA: ICD-10-CM

## 2018-05-08 PROCEDURE — 99214 OFFICE O/P EST MOD 30 MIN: CPT | Performed by: GENERAL PRACTICE

## 2018-05-08 RX ORDER — ATORVASTATIN CALCIUM 40 MG/1
40 TABLET, FILM COATED ORAL NIGHTLY
Qty: 30 TABLET | Refills: 5 | Status: SHIPPED | OUTPATIENT
Start: 2018-05-08 | End: 2018-12-04 | Stop reason: SDUPTHER

## 2018-05-08 RX ORDER — RISEDRONATE SODIUM 150 MG/1
150 TABLET, FILM COATED ORAL
Qty: 1 TABLET | Refills: 5 | Status: SHIPPED | OUTPATIENT
Start: 2018-05-08 | End: 2018-12-04 | Stop reason: SDUPTHER

## 2018-05-08 RX ORDER — IPRATROPIUM BROMIDE AND ALBUTEROL SULFATE 2.5; .5 MG/3ML; MG/3ML
3 SOLUTION RESPIRATORY (INHALATION) EVERY 4 HOURS PRN
Qty: 540 ML | Refills: 5 | Status: SHIPPED | OUTPATIENT
Start: 2018-05-08 | End: 2019-05-17 | Stop reason: SDUPTHER

## 2018-05-08 RX ORDER — SUMATRIPTAN 100 MG/1
100 TABLET, FILM COATED ORAL ONCE AS NEEDED
Qty: 9 TABLET | Refills: 5 | Status: SHIPPED | OUTPATIENT
Start: 2018-05-08 | End: 2018-12-04 | Stop reason: SDUPTHER

## 2018-05-08 RX ORDER — TOLTERODINE 4 MG/1
4 CAPSULE, EXTENDED RELEASE ORAL NIGHTLY
Qty: 30 CAPSULE | Refills: 5 | Status: SHIPPED | OUTPATIENT
Start: 2018-05-08 | End: 2018-12-04 | Stop reason: SDUPTHER

## 2018-05-08 RX ORDER — LANOLIN ALCOHOL/MO/W.PET/CERES
1000 CREAM (GRAM) TOPICAL DAILY
Qty: 30 TABLET | Refills: 5 | Status: SHIPPED | OUTPATIENT
Start: 2018-05-08 | End: 2018-12-04 | Stop reason: SDUPTHER

## 2018-05-08 RX ORDER — DILTIAZEM HYDROCHLORIDE 360 MG/1
360 CAPSULE, EXTENDED RELEASE ORAL DAILY
Qty: 30 CAPSULE | Refills: 5 | Status: SHIPPED | OUTPATIENT
Start: 2018-05-08 | End: 2018-12-04 | Stop reason: SDUPTHER

## 2018-05-08 RX ORDER — HYDROCHLOROTHIAZIDE 25 MG/1
25 TABLET ORAL DAILY
Qty: 30 TABLET | Refills: 5 | Status: SHIPPED | OUTPATIENT
Start: 2018-05-08 | End: 2018-12-04 | Stop reason: SDUPTHER

## 2018-05-08 RX ORDER — PANTOPRAZOLE SODIUM 40 MG/1
40 TABLET, DELAYED RELEASE ORAL 2 TIMES DAILY
Qty: 60 TABLET | Refills: 5 | Status: SHIPPED | OUTPATIENT
Start: 2018-05-08 | End: 2018-12-04 | Stop reason: SDUPTHER

## 2018-05-08 RX ORDER — ESTRADIOL 0.1 MG/G
2 CREAM VAGINAL 2 TIMES WEEKLY
Qty: 20 G | Refills: 5 | Status: SHIPPED | OUTPATIENT
Start: 2018-05-10 | End: 2018-12-04 | Stop reason: SDUPTHER

## 2018-05-08 NOTE — PROGRESS NOTES
Subjective   Albino Estrella is a 67 y.o. female.     History of Present Illness     COPD  The patient reports that her SOB, cough and wheezing have been somewhat better since last here. She states that these symptoms occur at any time during the day or night. She reports that her symptoms become worse with activity. She has been using trelegy and feels that it has helpd a lot. To date she has not experienced The patient states she also has nasal congestion and post nasal drip/clearing throat. She is following the treatment plan, including medications as directed. She currently smokes approximately 1 packs per day. Low dose CT of the chest performed on 4/2/18 was reported as showing a 15 mm RLL pulmonary nodule, COPD, mild coronary artery vascular calcifications, and a cyst of the left upper kidney. PET scan performed on 4/27/18 revealed no hypermetabolism of the lesion noted on CT but a diffusely enlarged and hypermetabolic thyroid. U/S of the kidneys performed the same day was unremarkable. TSH drawn on 11/20/17 was 5.87. 3 month follow up CT of the chest was recommended    Hypertension  Home blood pressure readings: not doing. Associated signs and symptoms: dyspnea. Patient denies: chest pain, palpitations, orthopnea, paroxysmal nocturnal dyspnea and peripheral edema. Current antihypertensive medications includes diltiazem and hydrochlorothiazide. Medication compliance: taking as prescribed.      Dyslipidemia  Compliance with treatment has been fair. The patient exercises intermittently. She is currently being prescribed the following medication for her dyslipidemia - atorvastatin. Patient denies side effects associated with her medications.     GERD  Patient has a history of of heartburn and regurgitation. Symptoms have been present for a number years . Symptoms include abdominal bloating. The patient denies dysphagia, hematemesis and melena. Symptoms appear to be worsened by large meals and lying down. Risk  factors present for GERD include tobacco abuse and caffeine use. Risk factors absent for GERD are tobacco abuse and caffeine use. Studies performed so far include none. Treatments tried so far include lifestyle change including caffeine reduction, dietary changes, elevate HOB, tobacco cessation, proton pump inhibitor: pantoprazole. Results of treatment: almost no occurrences of symptoms provided she takes this twice daily. Currently, the symptoms remain intermittent and occur approximately 1 times per month    The following portions of the patient's history were reviewed and updated as appropriate: allergies, current medications, past medical history, past social history and problem list.    Review of Systems   Constitutional: Positive for fatigue. Negative for appetite change, chills, fever and unexpected weight change.   HENT: Negative for congestion, ear pain, postnasal drip, sinus pressure, sneezing, sore throat and voice change.    Eyes: Negative for visual disturbance.   Respiratory: Positive for cough, shortness of breath and wheezing. Negative for stridor.    Cardiovascular: Negative for chest pain, palpitations and leg swelling.   Gastrointestinal: Negative for abdominal pain, blood in stool, constipation, diarrhea, nausea and vomiting.   Endocrine: Negative for polydipsia.   Genitourinary: Positive for dysuria (intermittent). Negative for difficulty urinating, frequency, hematuria, menstrual problem, pelvic pain, urgency, vaginal bleeding and vaginal discharge.   Musculoskeletal: Negative for arthralgias, back pain, joint swelling, myalgias and neck pain.   Skin: Negative for color change.   Neurological: Positive for dizziness (chronic-intermittent - responds to meclizine) and headaches (chronic - intermittent - stable). Negative for tremors, weakness and numbness.   Psychiatric/Behavioral: Negative for dysphoric mood, sleep disturbance and suicidal ideas. The patient is not nervous/anxious.       Objective   Physical Exam   Constitutional: She is oriented to person, place, and time. No distress.   Bright and in good spirits. No apparent distress. No pallor, jaundice, diaphoresis, or cyanosis.     HENT:   Head: Atraumatic.   Right Ear: External ear normal.   Left Ear: External ear normal.   Nose: Nose normal.   Mouth/Throat: Oropharynx is clear and moist. Mucous membranes are not pale and not cyanotic.   Eyes: EOM are normal. Pupils are equal, round, and reactive to light. No scleral icterus.   Neck: No JVD present. Carotid bruit is not present. No tracheal deviation present. No thyromegaly present.   Cardiovascular: Normal rate, regular rhythm, S1 normal, S2 normal and intact distal pulses.  Exam reveals no gallop, no S3 and no S4.    No murmur heard.  Pulmonary/Chest: No accessory muscle usage or stridor. No respiratory distress. She has decreased breath sounds. She has wheezes (mild).   Abdominal: Soft. Normal aorta and bowel sounds are normal. She exhibits no distension, no abdominal bruit and no mass. There is no hepatosplenomegaly. There is no tenderness. No hernia.   Musculoskeletal: She exhibits no tenderness or deformity.     Vascular Status -  Her right foot exhibits no edema. Her left foot exhibits no edema.  Lymphadenopathy:        Head (right side): No submandibular adenopathy present.        Head (left side): No submandibular adenopathy present.     She has no cervical adenopathy.   Neurological: She is alert and oriented to person, place, and time. She has normal reflexes. She displays normal reflexes. No cranial nerve deficit. She exhibits normal muscle tone. Coordination normal.   Skin: Skin is warm and dry. No rash noted. She is not diaphoretic. No cyanosis. No pallor. Nails show no clubbing.   Psychiatric: She has a normal mood and affect.     Assessment/Plan   Problems Addressed this Visit        Cardiovascular and Mediastinum    Mixed hyperlipidemia   Encouraged to continue to work on  her diet and exercise plan.  Continue current medication    Relevant Medications    atorvastatin (LIPITOR) 40 MG tablet    Essential hypertension  As above.   Continue current medication.    Relevant Medications    hydrochlorothiazide (HYDRODIURIL) 25 MG tablet    diltiaZEM (TIAZAC) 360 MG 24 hr capsule    Migraine without aura and without status migrainosus, not intractable    Relevant Medications    SUMAtriptan (IMITREX) 100 MG tablet    diltiaZEM (TIAZAC) 360 MG 24 hr capsule       Respiratory    COPD mixed type  COPD is stable.  Reminded of the importance of smoking cessation  Encouraged to remain as active as symptoms allow for    Relevant Medications    Fluticasone-Umeclidin-Vilant 100-62.5-25 MCG/INH aerosol powder     ipratropium-albuterol (DUONEB) 0.5-2.5 mg/3 ml nebulizer    Mass of lower lobe of right lung  Will arrange a 3 month follow up CT as recommended by radiology    Relevant Orders    CT Chest With Contrast       Digestive    Gastroesophageal reflux disease without esophagitis    Relevant Medications    pantoprazole (PROTONIX) 40 MG EC tablet    IBS (irritable bowel syndrome)       Musculoskeletal and Integument    Bladder instability    Relevant Medications    tolterodine LA (DETROL LA) 4 MG 24 hr capsule    Osteoporosis    Relevant Medications    risedronate (ACTONEL) 150 MG tablet    Primary osteoarthritis       Other    H/O recurrent urinary tract infection    Relevant Medications    estradiol (ESTRACE) 0.1 MG/GM vaginal cream (Start on 5/10/2018)    Prediabetes  Will continue to monitor    Healthcare maintenance  Encouraged to follow up with shingrix.  Patient remains uninterested in a screening colonoscopy    Relevant Medications    vitamin B-12 (CYANOCOBALAMIN) 1000 MCG tablet    Smoker

## 2018-05-09 VITALS
HEIGHT: 62 IN | OXYGEN SATURATION: 97 % | WEIGHT: 110 LBS | SYSTOLIC BLOOD PRESSURE: 125 MMHG | HEART RATE: 80 BPM | RESPIRATION RATE: 12 BRPM | TEMPERATURE: 98.4 F | BODY MASS INDEX: 20.24 KG/M2 | DIASTOLIC BLOOD PRESSURE: 80 MMHG

## 2018-08-01 RX ORDER — CEPHALEXIN 250 MG/1
250 CAPSULE ORAL 4 TIMES DAILY
Qty: 40 CAPSULE | Refills: 0 | Status: SHIPPED | OUTPATIENT
Start: 2018-08-01 | End: 2018-08-11

## 2018-09-14 ENCOUNTER — OFFICE VISIT (OUTPATIENT)
Dept: FAMILY MEDICINE CLINIC | Facility: CLINIC | Age: 68
End: 2018-09-14

## 2018-09-14 DIAGNOSIS — I10 ESSENTIAL HYPERTENSION: Primary | ICD-10-CM

## 2018-09-14 DIAGNOSIS — R91.8 MASS OF LOWER LOBE OF RIGHT LUNG: ICD-10-CM

## 2018-09-14 DIAGNOSIS — K21.9 GASTROESOPHAGEAL REFLUX DISEASE WITHOUT ESOPHAGITIS: ICD-10-CM

## 2018-09-14 DIAGNOSIS — E78.2 MIXED HYPERLIPIDEMIA: ICD-10-CM

## 2018-09-14 DIAGNOSIS — M15.9 PRIMARY OSTEOARTHRITIS INVOLVING MULTIPLE JOINTS: ICD-10-CM

## 2018-09-14 DIAGNOSIS — M80.00XA AGE-RELATED OSTEOPOROSIS WITH CURRENT PATHOLOGICAL FRACTURE, INITIAL ENCOUNTER: ICD-10-CM

## 2018-09-14 DIAGNOSIS — F17.200 SMOKER: ICD-10-CM

## 2018-09-14 DIAGNOSIS — K58.8 OTHER IRRITABLE BOWEL SYNDROME: ICD-10-CM

## 2018-09-14 DIAGNOSIS — Z23 ENCOUNTER FOR IMMUNIZATION: ICD-10-CM

## 2018-09-14 DIAGNOSIS — N32.89 BLADDER INSTABILITY: ICD-10-CM

## 2018-09-14 DIAGNOSIS — Z12.11 SPECIAL SCREENING FOR MALIGNANT NEOPLASMS, COLON: ICD-10-CM

## 2018-09-14 DIAGNOSIS — R73.03 PREDIABETES: ICD-10-CM

## 2018-09-14 DIAGNOSIS — Z87.440 H/O RECURRENT URINARY TRACT INFECTION: ICD-10-CM

## 2018-09-14 DIAGNOSIS — B37.0 ORAL CANDIDIASIS: ICD-10-CM

## 2018-09-14 DIAGNOSIS — Z00.00 HEALTHCARE MAINTENANCE: ICD-10-CM

## 2018-09-14 DIAGNOSIS — G43.009 MIGRAINE WITHOUT AURA AND WITHOUT STATUS MIGRAINOSUS, NOT INTRACTABLE: ICD-10-CM

## 2018-09-14 DIAGNOSIS — J44.9 COPD MIXED TYPE (HCC): ICD-10-CM

## 2018-09-14 PROCEDURE — 81003 URINALYSIS AUTO W/O SCOPE: CPT | Performed by: GENERAL PRACTICE

## 2018-09-14 PROCEDURE — 90471 IMMUNIZATION ADMIN: CPT | Performed by: GENERAL PRACTICE

## 2018-09-14 PROCEDURE — 90686 IIV4 VACC NO PRSV 0.5 ML IM: CPT | Performed by: GENERAL PRACTICE

## 2018-09-14 PROCEDURE — 99214 OFFICE O/P EST MOD 30 MIN: CPT | Performed by: GENERAL PRACTICE

## 2018-09-14 NOTE — PROGRESS NOTES
Subjective   Albino Estrella is a 67 y.o. female.     History of Present Illness     COPD  The patient reports that her SOB, cough and wheezing have been better yet since last here. She states that these symptoms occur at any time during the day or night. She reports that her symptoms become worse with activity. She has been using trelegy and feels that it has helpd a lot. To date she has not experienced any side effects. The patient states she also has nasal congestion and post nasal drip/clearing throat. She is following the treatment plan, including medications as directed. She currently smokes approximately 1 packs per day. Low dose CT of the chest performed on 4/2/18 was reported as showing a 15 mm RLL pulmonary nodule, COPD, mild coronary artery vascular calcifications, and a cyst of the left upper kidney. PET scan performed on 4/27/18 revealed no hypermetabolism of the lesion noted on CT but a diffusely enlarged and hypermetabolic thyroid. U/S of the kidneys performed the same day was unremarkable. TSH drawn on 11/20/17 was 5.87. Three month follow up CT of the chest was recommended and ordered but has not been done    Hypertension  Home blood pressure readings: not doing. Associated signs and symptoms: dyspnea. Patient denies: chest pain, palpitations, orthopnea, paroxysmal nocturnal dyspnea and peripheral edema. Current antihypertensive medications includes diltiazem and hydrochlorothiazide. Medication compliance: taking as prescribed.      Dyslipidemia  Compliance with treatment has been fair. The patient exercises intermittently. She is currently being prescribed the following medication for her dyslipidemia - atorvastatin. Patient denies side effects associated with her medications. She has had no recent labs    GERD  Patient has a history of of heartburn and regurgitation. Symptoms have been present for a number years . Symptoms include abdominal bloating. The patient denies dysphagia, hematemesis and  melena. Symptoms appear to be worsened by large meals and lying down. Risk factors present for GERD include tobacco abuse and caffeine use. Risk factors absent for GERD are tobacco abuse and caffeine use. Studies performed so far include none. Treatments tried so far include lifestyle change including caffeine reduction, dietary changes, elevate HOB, tobacco cessation, proton pump inhibitor: pantoprazole. Results of treatment: almost no occurrences of symptoms provided she takes this twice daily. Currently, the symptoms remain intermittent and occur approximately 1 times per month    The following portions of the patient's history were reviewed and updated as appropriate: allergies, current medications, past medical history, past social history and problem list.    Review of Systems   Constitutional: Positive for fatigue. Negative for appetite change, chills, fever and unexpected weight change.   HENT: Negative for congestion, ear pain, postnasal drip, sinus pressure, sneezing, sore throat and voice change.    Eyes: Negative for visual disturbance.   Respiratory: Positive for cough, shortness of breath and wheezing. Negative for stridor.    Cardiovascular: Negative for chest pain, palpitations and leg swelling.   Gastrointestinal: Negative for abdominal pain, blood in stool, constipation, diarrhea, nausea and vomiting.   Endocrine: Negative for polydipsia.   Genitourinary: Positive for dysuria (intermittent). Negative for difficulty urinating, frequency, hematuria, menstrual problem, pelvic pain, urgency, vaginal bleeding and vaginal discharge.   Musculoskeletal: Negative for arthralgias, back pain, joint swelling, myalgias and neck pain.   Skin: Negative for color change.   Neurological: Positive for dizziness (chronic-intermittent - responds to meclizine) and headaches (chronic - intermittent - stable). Negative for tremors, weakness and numbness.   Psychiatric/Behavioral: Negative for dysphoric mood, sleep  disturbance and suicidal ideas. The patient is not nervous/anxious.      Objective   Physical Exam   Constitutional: She is oriented to person, place, and time. No distress.   Bright and in good spirits. No apparent distress. No pallor, jaundice, diaphoresis, or cyanosis.     HENT:   Head: Atraumatic.   Right Ear: External ear normal.   Left Ear: External ear normal.   Nose: Nose normal.   Mouth/Throat: Mucous membranes are not pale and not cyanotic. Oropharyngeal exudate (scattered small white plaques with erythematous base about the soft palate) present.   Eyes: Pupils are equal, round, and reactive to light. EOM are normal. No scleral icterus.   Neck: No JVD present. Carotid bruit is not present. No tracheal deviation present. No thyromegaly present.   Cardiovascular: Normal rate, regular rhythm, S1 normal, S2 normal and intact distal pulses.  Exam reveals no gallop, no S3 and no S4.    No murmur heard.  Pulmonary/Chest: No accessory muscle usage or stridor. No respiratory distress. She has decreased breath sounds. She has wheezes (mild).   Abdominal: Soft. Normal aorta and bowel sounds are normal. She exhibits no distension, no abdominal bruit and no mass. There is no hepatosplenomegaly. There is no tenderness. No hernia.   Musculoskeletal: She exhibits no tenderness or deformity.     Vascular Status -  Her right foot exhibits no edema. Her left foot exhibits no edema.  Lymphadenopathy:        Head (right side): No submandibular adenopathy present.        Head (left side): No submandibular adenopathy present.     She has no cervical adenopathy.   Neurological: She is alert and oriented to person, place, and time. She has normal reflexes. She displays normal reflexes. No cranial nerve deficit. She exhibits normal muscle tone. Coordination normal.   Skin: Skin is warm and dry. No rash noted. She is not diaphoretic. No cyanosis. No pallor. Nails show no clubbing.   Psychiatric: She has a normal mood and affect.      Assessment/Plan   Problems Addressed this Visit        Cardiovascular and Mediastinum    Mixed hyperlipidemia  Encouraged to continue to work on her diet and exercise plan.  Continue current medication  Updated labs will be drawn at return.    Essential hypertension    Hypertension: at goal. Evidence of target organ damage: none.  As above.   Continue current medication.    Migraine without aura and without status migrainosus, not intractable       Respiratory    COPD mixed type (CMS/HCC)    COPD is stable.  Reminded of the importance of smoking cessation  Encouraged to remain as active as symptoms allow for  Instructed to rinse her mouth out after using trelegy    Mass of lower lobe of right lung  Will schedule updated CT of the lungs       Digestive    Gastroesophageal reflux disease without esophagitis   Symptoms are currently stable.  Reminded regarding lifestyle modification.  Continue current medication    IBS (irritable bowel syndrome)       Musculoskeletal and Integument    Bladder instability    Osteoporosis    Primary osteoarthritis       Other    H/O recurrent urinary tract infection  Urinalysis updated    Relevant Orders    POC Urinalysis Dipstick, Automated (Completed)    Prediabetes  Will continue to monitor    Encounter for immunization    Relevant Orders    Fluarix/Fluzone/Afluria Quad>6 Months (Completed)    Healthcare maintenance  Recommended a flu shot    Relevant Orders    Fluarix/Fluzone/Afluria Quad>6 Months (Completed)    Smoker    Oral candidiasis  Will start on antifungal medication

## 2018-09-15 VITALS
TEMPERATURE: 98.6 F | BODY MASS INDEX: 19.58 KG/M2 | HEIGHT: 62 IN | SYSTOLIC BLOOD PRESSURE: 110 MMHG | WEIGHT: 106.4 LBS | OXYGEN SATURATION: 91 % | HEART RATE: 88 BPM | RESPIRATION RATE: 12 BRPM | DIASTOLIC BLOOD PRESSURE: 65 MMHG

## 2018-09-15 RX ORDER — FLUCONAZOLE 150 MG/1
150 TABLET ORAL DAILY
Qty: 3 TABLET | Refills: 0 | Status: SHIPPED | OUTPATIENT
Start: 2018-09-15 | End: 2018-10-15

## 2018-09-17 ENCOUNTER — TELEPHONE (OUTPATIENT)
Dept: FAMILY MEDICINE CLINIC | Facility: CLINIC | Age: 68
End: 2018-09-17

## 2018-09-17 NOTE — TELEPHONE ENCOUNTER
Faxed order to [facility]    ----- Message from Stan Alvarado MD sent at 9/14/2018 10:30 AM EDT -----  Need to find out if insurance covers [facility] and if so call into pharm

## 2018-09-25 ENCOUNTER — HOSPITAL ENCOUNTER (OUTPATIENT)
Dept: CT IMAGING | Facility: HOSPITAL | Age: 68
Discharge: HOME | End: 2018-09-25
Admitting: GENERAL PRACTICE

## 2018-09-25 DIAGNOSIS — R91.8 MASS OF LOWER LOBE OF RIGHT LUNG: ICD-10-CM

## 2018-09-25 LAB — CREAT BLDA-MCNC: 0.7 MG/DL (ref 0.6–1.3)

## 2018-09-25 PROCEDURE — 82565 ASSAY OF CREATININE: CPT

## 2018-09-25 PROCEDURE — 71260 CT THORAX DX C+: CPT | Performed by: RADIOLOGY

## 2018-09-25 PROCEDURE — 71260 CT THORAX DX C+: CPT

## 2018-09-25 PROCEDURE — 25010000002 IOPAMIDOL 61 % SOLUTION: Performed by: RADIOLOGY

## 2018-09-25 RX ADMIN — IOPAMIDOL 70 ML: 612 INJECTION, SOLUTION INTRAVENOUS at 09:09

## 2018-09-25 NOTE — PROGRESS NOTES
Spoke with pt about the following per Dr. Alvarado. [facility]      -- Please let patient know that her lung nodule was smaller - will return to normal screening - low dose CT once yearly

## 2018-10-11 DIAGNOSIS — Z12.11 SPECIAL SCREENING FOR MALIGNANT NEOPLASMS, COLON: Primary | ICD-10-CM

## 2018-10-15 ENCOUNTER — OFFICE VISIT (OUTPATIENT)
Dept: FAMILY MEDICINE CLINIC | Facility: CLINIC | Age: 68
End: 2018-10-15

## 2018-10-15 VITALS
DIASTOLIC BLOOD PRESSURE: 80 MMHG | BODY MASS INDEX: 19.32 KG/M2 | HEIGHT: 62 IN | SYSTOLIC BLOOD PRESSURE: 160 MMHG | OXYGEN SATURATION: 98 % | HEART RATE: 86 BPM | WEIGHT: 105 LBS | TEMPERATURE: 98.6 F | RESPIRATION RATE: 12 BRPM

## 2018-10-15 DIAGNOSIS — E78.2 MIXED HYPERLIPIDEMIA: ICD-10-CM

## 2018-10-15 DIAGNOSIS — J44.9 COPD MIXED TYPE (HCC): ICD-10-CM

## 2018-10-15 DIAGNOSIS — Z87.440 H/O RECURRENT URINARY TRACT INFECTION: Primary | ICD-10-CM

## 2018-10-15 DIAGNOSIS — I10 ESSENTIAL HYPERTENSION: ICD-10-CM

## 2018-10-15 DIAGNOSIS — R19.5 POSITIVE COLORECTAL CANCER SCREENING USING DNA-BASED STOOL TEST: ICD-10-CM

## 2018-10-15 DIAGNOSIS — R73.03 PREDIABETES: ICD-10-CM

## 2018-10-15 DIAGNOSIS — F17.200 SMOKER: ICD-10-CM

## 2018-10-15 DIAGNOSIS — N32.89 BLADDER INSTABILITY: ICD-10-CM

## 2018-10-15 DIAGNOSIS — B37.0 ORAL CANDIDIASIS: ICD-10-CM

## 2018-10-15 PROBLEM — R91.8 MASS OF LOWER LOBE OF RIGHT LUNG: Status: RESOLVED | Noted: 2018-04-03 | Resolved: 2018-10-15

## 2018-10-15 PROCEDURE — 99214 OFFICE O/P EST MOD 30 MIN: CPT | Performed by: GENERAL PRACTICE

## 2018-10-15 NOTE — PROGRESS NOTES
Subjective   Albino Estrella is a 67 y.o. female.     History of Present Illness     Positive Cologuard  The sample performed on 10/3/18 returned positive. She denies any abdominal pain, change in her bowel habits, hematochezia or melena and has had no fever or chills.    COPD  The patient reports that her SOB, cough and wheezing have been at baseline since last here. She states that these symptoms occur at any time during the day or night. She reports that her symptoms become worse with activity. She remains on trelegy and feels that it has helpd a lot. She rinses her mouth out after each use. The patient states she also has nasal congestion and post nasal drip/clearing throat. She is following the treatment plan, including medications as directed. She currently smokes approximately 1 packs per day. Low dose CT of the chest performed on 4/2/18 was reported as showing a 15 mm RLL pulmonary nodule, COPD, mild coronary artery vascular calcifications, and a cyst of the left upper kidney. PET scan performed on 4/27/18 revealed no hypermetabolism of the lesion noted on CT but a diffusely enlarged and hypermetabolic thyroid. U/S of the kidneys performed the same day was unremarkable. TSH drawn on 11/20/17 was 5.87. Follow up CT of the chest was performed on 9/25/18 and revealed a decrease in the RLL pulmonary nodule to 13 mm. The study was otherwise stable    Hypertension  Home blood pressure readings: not doing. Associated signs and symptoms: dyspnea. Patient denies: chest pain, palpitations, orthopnea, paroxysmal nocturnal dyspnea and peripheral edema. Current antihypertensive medications includes diltiazem and hydrochlorothiazide. Medication compliance: taking as prescribed.      Dyslipidemia  Compliance with treatment has been fair. The patient exercises intermittently. She is currently being prescribed the following medication for her dyslipidemia - atorvastatin. Patient denies side effects associated with her  medications. She has had no recent labs    The following portions of the patient's history were reviewed and updated as appropriate: allergies, current medications, past medical history, past social history and problem list.    Review of Systems   Constitutional: Positive for fatigue. Negative for appetite change, chills, fever and unexpected weight change.   HENT: Negative for congestion, ear pain, postnasal drip, sinus pressure, sneezing, sore throat and voice change.    Eyes: Negative for visual disturbance.   Respiratory: Positive for cough, shortness of breath and wheezing. Negative for stridor.    Cardiovascular: Negative for chest pain, palpitations and leg swelling.   Gastrointestinal: Negative for abdominal pain, blood in stool, constipation, diarrhea, nausea and vomiting.   Endocrine: Negative for polydipsia.   Genitourinary: Positive for dysuria (intermittent) and frequency. Negative for difficulty urinating, hematuria, menstrual problem, pelvic pain, urgency, vaginal bleeding and vaginal discharge.   Musculoskeletal: Negative for arthralgias, back pain, joint swelling, myalgias and neck pain.   Skin: Negative for color change.   Neurological: Positive for dizziness (chronic-intermittent - responds to meclizine) and headaches (chronic - intermittent - stable). Negative for tremors, weakness and numbness.   Psychiatric/Behavioral: Negative for dysphoric mood, sleep disturbance and suicidal ideas. The patient is not nervous/anxious.      Objective   Physical Exam   Constitutional: She is oriented to person, place, and time. No distress.   Bright and in good spirits. No apparent distress. No pallor, jaundice, diaphoresis, or cyanosis.     HENT:   Head: Atraumatic.   Right Ear: External ear normal.   Left Ear: External ear normal.   Nose: Nose normal.   Mouth/Throat: Mucous membranes are not pale and not cyanotic. Oropharyngeal exudate (scattered small white plaques with erythematous base about the soft  palate) present.   Bilateral cerumen impaction   Eyes: Pupils are equal, round, and reactive to light. EOM are normal. No scleral icterus.   Neck: No JVD present. Carotid bruit is not present. No tracheal deviation present. No thyromegaly present.   Cardiovascular: Normal rate, regular rhythm, S1 normal, S2 normal and intact distal pulses.  Exam reveals no gallop, no S3 and no S4.    No murmur heard.  Pulmonary/Chest: No accessory muscle usage or stridor. No respiratory distress. She has decreased breath sounds. She has wheezes (mild).   Abdominal: Soft. Normal aorta and bowel sounds are normal. She exhibits no distension, no abdominal bruit and no mass. There is no hepatosplenomegaly. There is no tenderness. No hernia.   Musculoskeletal: She exhibits no tenderness or deformity.     Vascular Status -  Her right foot exhibits no edema. Her left foot exhibits no edema.  Lymphadenopathy:        Head (right side): No submandibular adenopathy present.        Head (left side): No submandibular adenopathy present.     She has no cervical adenopathy.   Neurological: She is alert and oriented to person, place, and time. She has normal reflexes. She displays normal reflexes. No cranial nerve deficit. She exhibits normal muscle tone. Coordination normal.   Skin: Skin is warm and dry. No rash noted. She is not diaphoretic. No cyanosis. No pallor. Nails show no clubbing.   Psychiatric: She has a normal mood and affect.     Assessment/Plan   Problems Addressed this Visit        Cardiovascular and Mediastinum    Mixed hyperlipidemia  Encouraged to continue to work on her diet and exercise plan.  Continue current medication    Essential hypertension   Hypertension: elevated today. Evidence of target organ damage: none.   As above  Continue current medication       Respiratory    COPD mixed type (CMS/HCC)   COPD is stable.  Reminded of the importance of smoking cessation  Encouraged to remain as active as symptoms allow for        Musculoskeletal and Integument    Bladder instability  Offered referral back to urology. Patient will consider       Other    H/O recurrent urinary tract infection   As above.     Prediabetes    Smoker    Positive colorectal cancer screening using DNA-based stool test  Reviewed potential implications and options going forward. Patient would like to proceed with a a colonoscopy and referral will be made    Relevant Orders    Ambulatory Referral to General Surgery    Oral candidiasis  Reminded to rinse mouth out after each use of trelegy  Will resume nystatin    Relevant Medications    nystatin (MYCOSTATIN) 214527 UNIT/ML suspension

## 2018-10-22 ENCOUNTER — OFFICE VISIT (OUTPATIENT)
Dept: SURGERY | Facility: CLINIC | Age: 68
End: 2018-10-22

## 2018-10-22 VITALS
HEART RATE: 85 BPM | BODY MASS INDEX: 19.32 KG/M2 | DIASTOLIC BLOOD PRESSURE: 41 MMHG | WEIGHT: 105 LBS | HEIGHT: 62 IN | SYSTOLIC BLOOD PRESSURE: 124 MMHG

## 2018-10-22 DIAGNOSIS — K58.8 OTHER IRRITABLE BOWEL SYNDROME: ICD-10-CM

## 2018-10-22 DIAGNOSIS — K21.9 GASTROESOPHAGEAL REFLUX DISEASE WITHOUT ESOPHAGITIS: ICD-10-CM

## 2018-10-22 DIAGNOSIS — R19.5 POSITIVE COLORECTAL CANCER SCREENING USING COLOGUARD TEST: Primary | ICD-10-CM

## 2018-10-22 PROCEDURE — 99202 OFFICE O/P NEW SF 15 MIN: CPT | Performed by: SURGERY

## 2018-10-22 RX ORDER — SODIUM, POTASSIUM,MAG SULFATES 17.5-3.13G
1 SOLUTION, RECONSTITUTED, ORAL ORAL EVERY 12 HOURS
Qty: 1 BOTTLE | Refills: 0 | Status: SHIPPED | OUTPATIENT
Start: 2018-10-22 | End: 2018-12-04

## 2018-10-23 RX ORDER — MONTELUKAST SODIUM 10 MG/1
10 TABLET ORAL NIGHTLY
COMMUNITY
End: 2018-12-04 | Stop reason: SDUPTHER

## 2018-10-25 NOTE — PROGRESS NOTES
Subjective   Albino Estrlela is a 67 y.o. female is being seen for consultation today at the request of Stan Alvarado MD    Albino Estrella is a 67 y.o. female With need for screening colonoscopy.  No family history of colon cancer.  No unintentional weight loss.  No blood in stools.        Past Medical History:   Diagnosis Date   • Arthritis    • Asthma    • Bladder instability    • COPD (chronic obstructive pulmonary disease) (CMS/HCC)    • Dyspareunia    • Elevated cholesterol    • GERD (gastroesophageal reflux disease)    • Hyperlipidemia    • Hypertension    • IBS (irritable bowel syndrome)    • Osteoporosis     post menopausal   • Peptic ulcer    • Prediabetes    • Recurrent UTI    • Screening for malignant neoplasm of cervix    • Sinusitis, chronic    • Weight loss        Family History   Problem Relation Age of Onset   • Breast cancer Neg Hx        Social History     Social History   • Marital status:      Spouse name: N/A   • Number of children: N/A   • Years of education: N/A     Occupational History   • Not on file.     Social History Main Topics   • Smoking status: Current Every Day Smoker     Packs/day: 1.00     Types: Cigarettes   • Smokeless tobacco: Never Used   • Alcohol use No   • Drug use: No   • Sexual activity: Defer     Other Topics Concern   • Not on file     Social History Narrative   • No narrative on file       Past Surgical History:   Procedure Laterality Date   • CHOLECYSTECTOMY         Review of Systems   Constitutional: Negative for activity change, appetite change, chills and fever.   HENT: Negative for sore throat and trouble swallowing.    Eyes: Negative for visual disturbance.   Respiratory: Negative for cough and shortness of breath.    Cardiovascular: Negative for chest pain and palpitations.   Gastrointestinal: Negative for abdominal distention, abdominal pain, blood in stool, constipation, diarrhea, nausea and vomiting.   Endocrine: Negative for cold intolerance  "and heat intolerance.   Genitourinary: Negative for dysuria.   Musculoskeletal: Negative for joint swelling.   Skin: Negative for color change, rash and wound.   Allergic/Immunologic: Negative for immunocompromised state.   Neurological: Negative for dizziness, seizures, weakness and headaches.   Hematological: Negative for adenopathy. Does not bruise/bleed easily.   Psychiatric/Behavioral: Negative for agitation and confusion.         /41   Pulse 85   Ht 157.5 cm (62\")   Wt 47.6 kg (105 lb)   LMP  (LMP Unknown)   BMI 19.20 kg/m²   Objective   Physical Exam   Constitutional: She is oriented to person, place, and time. She appears well-developed.   HENT:   Head: Normocephalic and atraumatic.   Mouth/Throat: Mucous membranes are normal.   Eyes: Pupils are equal, round, and reactive to light. Conjunctivae are normal.   Neck: Neck supple. No JVD present. No tracheal deviation present. No thyromegaly present.   Cardiovascular: Normal rate and regular rhythm.  Exam reveals no gallop and no friction rub.    No murmur heard.  Pulmonary/Chest: Effort normal and breath sounds normal.   Abdominal: Soft. She exhibits no distension. There is no splenomegaly or hepatomegaly. There is no tenderness. No hernia.   Musculoskeletal: Normal range of motion. She exhibits no deformity.   Neurological: She is alert and oriented to person, place, and time.   Skin: Skin is warm and dry.   Psychiatric: She has a normal mood and affect.         Albino was seen today for colonoscopy consult.    Diagnoses and all orders for this visit:    Positive colorectal cancer screening using Cologuard test  -     Case Request; Standing  -     Case Request    Gastroesophageal reflux disease without esophagitis    Other irritable bowel syndrome    Other orders  -     sodium-potassium-magnesium sulfates (SUPREP BOWEL PREP KIT) 17.5-3.13-1.6 GM/177ML solution oral solution; Take 1 bottle by mouth Every 12 (Twelve) Hours.  -     Follow anesthesia " standing orders.  -     Follow anesthesia standing orders.; Standing  -     Verify NPO Status; Standing  -     Obtain informed consent; Standing  -     SCD (sequential compression device)- to be placed on patient in Pre-op; Standing        Assessment     Albino Estrella is a 67 y.o. female with need for screening colonoscopy.  She will undergo procedure after understanding the risks and benefits of surgery.    Patient's Body mass index is 19.2 kg/m². BMI is within normal parameters. No follow-up required.

## 2018-10-26 ENCOUNTER — ANESTHESIA EVENT (OUTPATIENT)
Dept: PERIOP | Facility: HOSPITAL | Age: 68
End: 2018-10-26

## 2018-10-26 ENCOUNTER — ANESTHESIA (OUTPATIENT)
Dept: PERIOP | Facility: HOSPITAL | Age: 68
End: 2018-10-26

## 2018-10-26 ENCOUNTER — HOSPITAL ENCOUNTER (OUTPATIENT)
Facility: HOSPITAL | Age: 68
Setting detail: HOSPITAL OUTPATIENT SURGERY
Discharge: HOME | End: 2018-10-26
Attending: SURGERY | Admitting: SURGERY

## 2018-10-26 VITALS
RESPIRATION RATE: 20 BRPM | HEART RATE: 78 BPM | TEMPERATURE: 97.5 F | SYSTOLIC BLOOD PRESSURE: 155 MMHG | HEIGHT: 61 IN | DIASTOLIC BLOOD PRESSURE: 93 MMHG | OXYGEN SATURATION: 98 %

## 2018-10-26 DIAGNOSIS — R19.5 POSITIVE COLORECTAL CANCER SCREENING USING COLOGUARD TEST: ICD-10-CM

## 2018-10-26 PROCEDURE — 45380 COLONOSCOPY AND BIOPSY: CPT | Performed by: SURGERY

## 2018-10-26 PROCEDURE — 45385 COLONOSCOPY W/LESION REMOVAL: CPT | Performed by: SURGERY

## 2018-10-26 PROCEDURE — 25010000002 PROPOFOL 1000 MG/ML EMULSION: Performed by: NURSE ANESTHETIST, CERTIFIED REGISTERED

## 2018-10-26 PROCEDURE — 25010000002 PROPOFOL 10 MG/ML EMULSION: Performed by: NURSE ANESTHETIST, CERTIFIED REGISTERED

## 2018-10-26 PROCEDURE — 88305 TISSUE EXAM BY PATHOLOGIST: CPT | Performed by: SURGERY

## 2018-10-26 RX ORDER — ONDANSETRON 2 MG/ML
4 INJECTION INTRAMUSCULAR; INTRAVENOUS ONCE AS NEEDED
Status: DISCONTINUED | OUTPATIENT
Start: 2018-10-26 | End: 2018-10-26 | Stop reason: HOSPADM

## 2018-10-26 RX ORDER — PROPOFOL 10 MG/ML
VIAL (ML) INTRAVENOUS AS NEEDED
Status: DISCONTINUED | OUTPATIENT
Start: 2018-10-26 | End: 2018-10-26 | Stop reason: SURG

## 2018-10-26 RX ORDER — IPRATROPIUM BROMIDE AND ALBUTEROL SULFATE 2.5; .5 MG/3ML; MG/3ML
3 SOLUTION RESPIRATORY (INHALATION) ONCE AS NEEDED
Status: DISCONTINUED | OUTPATIENT
Start: 2018-10-26 | End: 2018-10-26 | Stop reason: HOSPADM

## 2018-10-26 RX ORDER — SODIUM CHLORIDE 0.9 % (FLUSH) 0.9 %
3 SYRINGE (ML) INJECTION EVERY 12 HOURS SCHEDULED
Status: DISCONTINUED | OUTPATIENT
Start: 2018-10-26 | End: 2018-10-26 | Stop reason: HOSPADM

## 2018-10-26 RX ORDER — LIDOCAINE HYDROCHLORIDE 20 MG/ML
INJECTION, SOLUTION INFILTRATION; PERINEURAL AS NEEDED
Status: DISCONTINUED | OUTPATIENT
Start: 2018-10-26 | End: 2018-10-26 | Stop reason: SURG

## 2018-10-26 RX ORDER — OXYCODONE HYDROCHLORIDE AND ACETAMINOPHEN 5; 325 MG/1; MG/1
1 TABLET ORAL ONCE AS NEEDED
Status: DISCONTINUED | OUTPATIENT
Start: 2018-10-26 | End: 2018-10-26 | Stop reason: HOSPADM

## 2018-10-26 RX ORDER — MEPERIDINE HYDROCHLORIDE 25 MG/ML
12.5 INJECTION INTRAMUSCULAR; INTRAVENOUS; SUBCUTANEOUS
Status: DISCONTINUED | OUTPATIENT
Start: 2018-10-26 | End: 2018-10-26 | Stop reason: HOSPADM

## 2018-10-26 RX ORDER — SODIUM CHLORIDE 0.9 % (FLUSH) 0.9 %
3-10 SYRINGE (ML) INJECTION AS NEEDED
Status: DISCONTINUED | OUTPATIENT
Start: 2018-10-26 | End: 2018-10-26 | Stop reason: HOSPADM

## 2018-10-26 RX ORDER — SODIUM CHLORIDE, SODIUM LACTATE, POTASSIUM CHLORIDE, CALCIUM CHLORIDE 600; 310; 30; 20 MG/100ML; MG/100ML; MG/100ML; MG/100ML
125 INJECTION, SOLUTION INTRAVENOUS CONTINUOUS
Status: DISCONTINUED | OUTPATIENT
Start: 2018-10-26 | End: 2018-10-26 | Stop reason: HOSPADM

## 2018-10-26 RX ORDER — FENTANYL CITRATE 50 UG/ML
50 INJECTION, SOLUTION INTRAMUSCULAR; INTRAVENOUS
Status: DISCONTINUED | OUTPATIENT
Start: 2018-10-26 | End: 2018-10-26 | Stop reason: HOSPADM

## 2018-10-26 RX ADMIN — PROPOFOL 30 MG: 10 INJECTION, EMULSION INTRAVENOUS at 09:52

## 2018-10-26 RX ADMIN — SODIUM CHLORIDE, POTASSIUM CHLORIDE, SODIUM LACTATE AND CALCIUM CHLORIDE: 600; 310; 30; 20 INJECTION, SOLUTION INTRAVENOUS at 09:51

## 2018-10-26 RX ADMIN — LIDOCAINE HYDROCHLORIDE 30 MG: 20 INJECTION, SOLUTION INFILTRATION; PERINEURAL at 09:52

## 2018-10-26 RX ADMIN — PROPOFOL 150 MCG/KG/MIN: 10 INJECTION, EMULSION INTRAVENOUS at 09:52

## 2018-10-26 NOTE — ANESTHESIA POSTPROCEDURE EVALUATION
Patient: Albino Estrella    Procedure Summary     Date:  10/26/18 Room / Location:  [facility] OR  /  [facility] OR    Anesthesia Start:  0951 Anesthesia Stop:  1016    Procedure:  COLONOSCOPY (N/A ) Diagnosis:       Positive colorectal cancer screening using Cologuard test      (Positive colorectal cancer screening using Cologuard test [R19.5])    Surgeon:  Sukhi Ruiz MD Provider:  Kerwin Monaco MD    Anesthesia Type:  general ASA Status:  3          Anesthesia Type: general  Last vitals  BP   122/81 (10/26/18 1028)   Temp   97.5 °F (36.4 °C) (10/26/18 1018)   Pulse   70 (10/26/18 1028)   Resp   20 (10/26/18 1028)     SpO2   98 % (10/26/18 1028)     Post Anesthesia Care and Evaluation    Patient location during evaluation: PHASE II  Patient participation: complete - patient participated  Level of consciousness: awake and alert  Pain score: 1  Pain management: adequate  Airway patency: patent  Anesthetic complications: No anesthetic complications  PONV Status: controlled  Cardiovascular status: acceptable  Respiratory status: acceptable  Hydration status: acceptable

## 2018-10-26 NOTE — ANESTHESIA PREPROCEDURE EVALUATION
Anesthesia Evaluation     Patient summary reviewed and Nursing notes reviewed   no history of anesthetic complications:  NPO Solid Status: > 8 hours  NPO Liquid Status: > 8 hours           Airway   Mallampati: II  TM distance: >3 FB  Neck ROM: full  no difficulty expected  Dental - normal exam   (+) edentulous, lower dentures and upper dentures    Pulmonary - normal exam   (+) a smoker Current, COPD, asthma,   Cardiovascular - normal exam  Exercise tolerance: good (4-7 METS)    NYHA Classification: II    (+) hypertension, hyperlipidemia,   (-) past MI, dysrhythmias, angina, CHF      Neuro/Psych  (+) headaches, dizziness/light headedness,     (-) seizures, CVA  GI/Hepatic/Renal/Endo    (+)  GERD, PUD,  renal disease,   (-) liver disease, diabetes, hypothyroidism    Musculoskeletal     Abdominal  - normal exam    Bowel sounds: normal.   Substance History - negative use     OB/GYN negative ob/gyn ROS         Other   (+) arthritis                     Anesthesia Plan    ASA 3     general     intravenous induction   Anesthetic plan, all risks, benefits, and alternatives have been provided, discussed and informed consent has been obtained with: patient, child and sibling.  Use of blood products discussed with patient, sibling and child  Consented to blood products.

## 2018-10-30 LAB
LAB AP CASE REPORT: NORMAL
PATH REPORT.FINAL DX SPEC: NORMAL

## 2018-11-07 ENCOUNTER — OFFICE VISIT (OUTPATIENT)
Dept: SURGERY | Facility: CLINIC | Age: 68
End: 2018-11-07

## 2018-11-07 VITALS — BODY MASS INDEX: 20.01 KG/M2 | HEIGHT: 61 IN | WEIGHT: 106 LBS

## 2018-11-07 DIAGNOSIS — K21.9 GASTROESOPHAGEAL REFLUX DISEASE WITHOUT ESOPHAGITIS: Primary | ICD-10-CM

## 2018-11-07 PROCEDURE — 99212 OFFICE O/P EST SF 10 MIN: CPT | Performed by: SURGERY

## 2018-11-09 NOTE — PROGRESS NOTES
Subjective   Albino Estrella is a 67 y.o. female  is here today for follow-up.         Albino Estrella is a 67 y.o. female here for follow up after EGD.  She is doing well PPI therapy and her abdominal pain is improved.  No diarrhea is noted.  On examination her abdomen is soft nontender nondistended.      Albino was seen today for s/p colonoscopy.    Diagnoses and all orders for this visit:    Gastroesophageal reflux disease without esophagitis        Assessment     Albino Estrella is a 67 y.o. female with improved abdominal pain.  Her EGD showed no concerning features and her pain is improving.  She will follow-up as needed.

## 2018-12-04 ENCOUNTER — OFFICE VISIT (OUTPATIENT)
Dept: FAMILY MEDICINE CLINIC | Facility: CLINIC | Age: 68
End: 2018-12-04

## 2018-12-04 DIAGNOSIS — J44.9 COPD MIXED TYPE (HCC): ICD-10-CM

## 2018-12-04 DIAGNOSIS — Z87.440 H/O RECURRENT URINARY TRACT INFECTION: ICD-10-CM

## 2018-12-04 DIAGNOSIS — M15.9 PRIMARY OSTEOARTHRITIS INVOLVING MULTIPLE JOINTS: ICD-10-CM

## 2018-12-04 DIAGNOSIS — R30.0 DYSURIA: Primary | ICD-10-CM

## 2018-12-04 DIAGNOSIS — G43.009 MIGRAINE WITHOUT AURA AND WITHOUT STATUS MIGRAINOSUS, NOT INTRACTABLE: ICD-10-CM

## 2018-12-04 DIAGNOSIS — N81.89 PELVIC RELAXATION: ICD-10-CM

## 2018-12-04 DIAGNOSIS — F17.200 SMOKER: ICD-10-CM

## 2018-12-04 DIAGNOSIS — K58.8 OTHER IRRITABLE BOWEL SYNDROME: ICD-10-CM

## 2018-12-04 DIAGNOSIS — Z00.00 HEALTHCARE MAINTENANCE: ICD-10-CM

## 2018-12-04 DIAGNOSIS — R42 VERTIGO: ICD-10-CM

## 2018-12-04 DIAGNOSIS — M80.00XA AGE-RELATED OSTEOPOROSIS WITH CURRENT PATHOLOGICAL FRACTURE, INITIAL ENCOUNTER: ICD-10-CM

## 2018-12-04 DIAGNOSIS — N32.89 BLADDER INSTABILITY: ICD-10-CM

## 2018-12-04 DIAGNOSIS — N39.0 RECURRENT UTI: ICD-10-CM

## 2018-12-04 DIAGNOSIS — K21.9 GASTROESOPHAGEAL REFLUX DISEASE WITHOUT ESOPHAGITIS: ICD-10-CM

## 2018-12-04 DIAGNOSIS — E78.2 MIXED HYPERLIPIDEMIA: ICD-10-CM

## 2018-12-04 DIAGNOSIS — R73.03 PREDIABETES: ICD-10-CM

## 2018-12-04 DIAGNOSIS — I10 ESSENTIAL HYPERTENSION: ICD-10-CM

## 2018-12-04 LAB
BILIRUB BLD-MCNC: NEGATIVE MG/DL
CLARITY, POC: CLEAR
COLOR UR: YELLOW
GLUCOSE UR STRIP-MCNC: NEGATIVE MG/DL
KETONES UR QL: NEGATIVE
LEUKOCYTE EST, POC: ABNORMAL
NITRITE UR-MCNC: NEGATIVE MG/ML
PH UR: 6 [PH] (ref 5–8)
PROT UR STRIP-MCNC: NEGATIVE MG/DL
RBC # UR STRIP: NEGATIVE /UL
SP GR UR: 1.01 (ref 1–1.03)
UROBILINOGEN UR QL: NORMAL

## 2018-12-04 PROCEDURE — 99214 OFFICE O/P EST MOD 30 MIN: CPT | Performed by: GENERAL PRACTICE

## 2018-12-04 PROCEDURE — 81003 URINALYSIS AUTO W/O SCOPE: CPT | Performed by: GENERAL PRACTICE

## 2018-12-04 RX ORDER — PANTOPRAZOLE SODIUM 40 MG/1
40 TABLET, DELAYED RELEASE ORAL 2 TIMES DAILY
Qty: 60 TABLET | Refills: 5 | Status: SHIPPED | OUTPATIENT
Start: 2018-12-04 | End: 2019-05-17 | Stop reason: SDUPTHER

## 2018-12-04 RX ORDER — RISEDRONATE SODIUM 150 MG/1
150 TABLET, FILM COATED ORAL
Qty: 1 TABLET | Refills: 5 | Status: SHIPPED | OUTPATIENT
Start: 2018-12-04 | End: 2019-05-17 | Stop reason: SDUPTHER

## 2018-12-04 RX ORDER — LANOLIN ALCOHOL/MO/W.PET/CERES
1000 CREAM (GRAM) TOPICAL DAILY
Qty: 30 TABLET | Refills: 5 | Status: SHIPPED | OUTPATIENT
Start: 2018-12-04 | End: 2019-05-17 | Stop reason: SDUPTHER

## 2018-12-04 RX ORDER — MECLIZINE HYDROCHLORIDE 25 MG/1
25 TABLET ORAL 3 TIMES DAILY PRN
Qty: 90 TABLET | Refills: 5 | Status: SHIPPED | OUTPATIENT
Start: 2018-12-04 | End: 2019-05-17 | Stop reason: SDUPTHER

## 2018-12-04 RX ORDER — ESTRADIOL 0.1 MG/G
2 CREAM VAGINAL 2 TIMES WEEKLY
Qty: 20 G | Refills: 5 | Status: SHIPPED | OUTPATIENT
Start: 2018-12-06 | End: 2019-05-17 | Stop reason: SDUPTHER

## 2018-12-04 RX ORDER — NITROFURANTOIN 25; 75 MG/1; MG/1
100 CAPSULE ORAL 2 TIMES DAILY
Qty: 20 CAPSULE | Refills: 0 | Status: SHIPPED | OUTPATIENT
Start: 2018-12-04 | End: 2019-03-05

## 2018-12-04 RX ORDER — TOLTERODINE 4 MG/1
4 CAPSULE, EXTENDED RELEASE ORAL NIGHTLY
Qty: 30 CAPSULE | Refills: 5 | Status: SHIPPED | OUTPATIENT
Start: 2018-12-04 | End: 2019-05-17 | Stop reason: SDUPTHER

## 2018-12-04 RX ORDER — SUMATRIPTAN 100 MG/1
100 TABLET, FILM COATED ORAL ONCE AS NEEDED
Qty: 9 TABLET | Refills: 5 | Status: SHIPPED | OUTPATIENT
Start: 2018-12-04 | End: 2019-05-17 | Stop reason: SDUPTHER

## 2018-12-04 RX ORDER — MONTELUKAST SODIUM 10 MG/1
10 TABLET ORAL NIGHTLY
Qty: 30 TABLET | Refills: 5 | Status: SHIPPED | OUTPATIENT
Start: 2018-12-04 | End: 2019-05-17 | Stop reason: SDUPTHER

## 2018-12-04 RX ORDER — ATORVASTATIN CALCIUM 40 MG/1
40 TABLET, FILM COATED ORAL NIGHTLY
Qty: 30 TABLET | Refills: 5 | Status: SHIPPED | OUTPATIENT
Start: 2018-12-04 | End: 2019-05-17 | Stop reason: SDUPTHER

## 2018-12-04 RX ORDER — DILTIAZEM HYDROCHLORIDE 360 MG/1
360 CAPSULE, EXTENDED RELEASE ORAL DAILY
Qty: 30 CAPSULE | Refills: 5 | Status: SHIPPED | OUTPATIENT
Start: 2018-12-04 | End: 2019-05-17 | Stop reason: SDUPTHER

## 2018-12-04 RX ORDER — HYDROCHLOROTHIAZIDE 25 MG/1
25 TABLET ORAL DAILY
Qty: 30 TABLET | Refills: 5 | Status: SHIPPED | OUTPATIENT
Start: 2018-12-04 | End: 2019-03-05

## 2018-12-04 NOTE — PROGRESS NOTES
Subjective   Albino Estrella is a 68 y.o. female.     History of Present Illness     Pelvic Pressure  She complains of increased pelvic pressure, particularly with prolonged standing, pushing, pulling, or lifting. This is associated with urinary frequency, urgency, dysuria, and occasional incontinence. She denies any hematuria, vaginal bleeding or discharge, change in her bowel habits, hematochezia, melena, fever or chills. She has a history of recurrent urinary tract infections    COPD  The patient reports that her SOB, cough and wheezing have remained at baseline since last here. She states that these symptoms occur at any time during the day or night. She reports that her symptoms become worse with activity. She remains on trelegy and feels that it has helped a lot. She rinses her mouth out after each use. The patient states she also has nasal congestion and post nasal drip/clearing throat. She is following the treatment plan, including medications as directed. She currently smokes approximately 1 packs per day. Low dose CT of the chest performed on 4/2/18 was reported as showing a 15 mm RLL pulmonary nodule, COPD, mild coronary artery vascular calcifications, and a cyst of the left upper kidney. PET scan performed on 4/27/18 revealed no hypermetabolism of the lesion noted on CT but a diffusely enlarged and hypermetabolic thyroid. U/S of the kidneys performed the same day was unremarkable. TSH drawn on 11/20/17 was 5.87. Follow up CT of the chest was performed on 9/25/18 and revealed a decrease in the RLL pulmonary nodule to 13 mm. The study was otherwise stable    Hypertension  Home blood pressure readings: not doing. Associated signs and symptoms: dyspnea. Patient denies: chest pain, palpitations, orthopnea, paroxysmal nocturnal dyspnea and peripheral edema. Current antihypertensive medications includes diltiazem and hydrochlorothiazide. Medication compliance: taking as prescribed.      Dyslipidemia  Compliance  with treatment has been fair. The patient exercises intermittently. She is currently being prescribed the following medication for her dyslipidemia - atorvastatin. Patient denies side effects associated with her medications. She has had no recent labs    Colonoscopy  Performed on 10/26/18 by Dr Ruiz. Multiple hyperplastic polyps were removed from the descending colon    The following portions of the patient's history were reviewed and updated as appropriate: allergies, current medications, past medical history, past social history and problem list.    Review of Systems   Constitutional: Positive for fatigue. Negative for appetite change, chills, fever and unexpected weight change.   HENT: Negative for congestion, ear pain, postnasal drip, sinus pressure, sneezing, sore throat and voice change.    Eyes: Negative for visual disturbance.   Respiratory: Positive for cough, shortness of breath and wheezing. Negative for stridor.    Cardiovascular: Negative for chest pain, palpitations and leg swelling.   Gastrointestinal: Negative for abdominal pain, blood in stool, constipation, diarrhea, nausea and vomiting.   Endocrine: Negative for polydipsia.   Genitourinary: Positive for dysuria (intermittent), frequency, pelvic pain (pressure) and urgency. Negative for difficulty urinating, hematuria, menstrual problem, vaginal bleeding and vaginal discharge.   Musculoskeletal: Negative for arthralgias, back pain, joint swelling, myalgias and neck pain.   Skin: Negative for color change.   Neurological: Positive for dizziness (chronic-intermittent - responds to meclizine) and headaches (chronic - intermittent - stable). Negative for tremors, weakness and numbness.   Psychiatric/Behavioral: Negative for dysphoric mood, sleep disturbance and suicidal ideas. The patient is not nervous/anxious.      Objective   Physical Exam   Constitutional: She is oriented to person, place, and time. No distress.   Bright and in good spirits. No  apparent distress. No pallor, jaundice, diaphoresis, or cyanosis.     HENT:   Head: Atraumatic.   Right Ear: External ear normal.   Left Ear: External ear normal.   Nose: Nose normal.   Mouth/Throat: Oropharynx is clear and moist. Mucous membranes are not pale and not cyanotic.   Eyes: EOM are normal. Pupils are equal, round, and reactive to light. No scleral icterus.   Neck: No JVD present. Carotid bruit is not present. No tracheal deviation present. No thyromegaly present.   Cardiovascular: Normal rate, regular rhythm, S1 normal, S2 normal and intact distal pulses. Exam reveals no gallop, no S3 and no S4.   No murmur heard.  Pulmonary/Chest: No accessory muscle usage or stridor. No respiratory distress. She has decreased breath sounds. She has wheezes (mild).   Abdominal: Soft. Normal aorta and bowel sounds are normal. She exhibits no distension, no abdominal bruit and no mass. There is no hepatosplenomegaly. There is no tenderness. No hernia.   Musculoskeletal: She exhibits no tenderness or deformity.     Vascular Status -  Her right foot exhibits no edema. Her left foot exhibits no edema.  Lymphadenopathy:        Head (right side): No submandibular adenopathy present.        Head (left side): No submandibular adenopathy present.     She has no cervical adenopathy.   Neurological: She is alert and oriented to person, place, and time. She has normal reflexes. She displays normal reflexes. No cranial nerve deficit. She exhibits normal muscle tone. Coordination normal.   Skin: Skin is warm and dry. No rash noted. She is not diaphoretic. No cyanosis. No pallor. Nails show no clubbing.   Psychiatric: She has a normal mood and affect.     Assessment/Plan   Problems Addressed this Visit        Cardiovascular and Mediastinum    Mixed hyperlipidemia  Encouraged to continue to work on her diet and exercise plan.  Continue current medication  Fasting labs scheduled    Relevant Medications    atorvastatin (LIPITOR) 40 MG  tablet    Other Relevant Orders    Lipid Panel    TSH    Essential hypertension  As above.   Continue current medication.    Relevant Medications    diltiaZEM (TIAZAC) 360 MG 24 hr capsule    hydrochlorothiazide (HYDRODIURIL) 25 MG tablet    Other Relevant Orders    CBC & Differential    Comprehensive Metabolic Panel    Migraine without aura and without status migrainosus, not intractable    Relevant Medications    diltiaZEM (TIAZAC) 360 MG 24 hr capsule    SUMAtriptan (IMITREX) 100 MG tablet       Respiratory    COPD mixed type (CMS/HCC)     COPD is stable.    Reminded of the importance of smoking cessation   Encouraged to remain as active as symptoms allow for           Digestive    Gastroesophageal reflux disease without esophagitis    Relevant Medications    pantoprazole (PROTONIX) 40 MG EC tablet    IBS (irritable bowel syndrome)       Musculoskeletal and Integument    Bladder instability    Relevant Medications    tolterodine LA (DETROL LA) 4 MG 24 hr capsule    Osteoporosis  Encouraged to continue to pursue weight bearing activities while exercising joint protection.  Continue current medication    Relevant Medications    risedronate (ACTONEL) 150 MG tablet    Other Relevant Orders    Vitamin D 25 Hydroxy    Primary osteoarthritis       Genitourinary    Pelvic relaxation  Recommended a GYN assessment. Patient agrees and referral will be made  Other Relevant Orders  Ambulatory Referral to Gynecology (Completed)       Other    H/O recurrent urinary tract infection  Urine will be sent for C+S  Will start on nitrofurantoin while awaiting the results    Relevant Medications    estradiol (ESTRACE) 0.1 MG/GM vaginal cream (Start on 12/6/2018)    nitrofurantoin, macrocrystal-monohydrate, (MACROBID) 100 MG capsule    Other Relevant Orders    POC Urinalysis Dipstick, Automated (Completed)    Urine Culture - Urine, Urine, Clean Catch    Urinalysis, Microscopic Only - Urine, Clean Catch    Ambulatory Referral to  Gynecology (Completed)    Prediabetes  Will continue to monitor    Relevant Orders    Hemoglobin A1c    Healthcare maintenance  Patient has already received a flu shot fall.  Encouraged to follow up with shingrix.  Will discuss an updated mammogram at her return    Relevant Medications    vitamin B-12 (CYANOCOBALAMIN) 1000 MCG tablet    Zoster Vac Recomb Adjuvanted (SHINGRIX) 50 MCG/0.5ML reconstituted suspension    Other Relevant Orders    Vitamin B12    Vertigo    Relevant Medications    meclizine (ANTIVERT) 25 MG tablet    Smoker

## 2018-12-05 VITALS
DIASTOLIC BLOOD PRESSURE: 65 MMHG | TEMPERATURE: 98.6 F | BODY MASS INDEX: 19.83 KG/M2 | HEART RATE: 84 BPM | OXYGEN SATURATION: 96 % | WEIGHT: 105 LBS | RESPIRATION RATE: 12 BRPM | SYSTOLIC BLOOD PRESSURE: 120 MMHG | HEIGHT: 61 IN

## 2018-12-05 PROBLEM — R19.5 POSITIVE COLORECTAL CANCER SCREENING USING COLOGUARD TEST: Status: RESOLVED | Noted: 2018-10-22 | Resolved: 2018-12-05

## 2018-12-05 PROBLEM — B37.0 ORAL CANDIDIASIS: Status: RESOLVED | Noted: 2018-10-15 | Resolved: 2018-12-05

## 2018-12-05 PROBLEM — R19.5 POSITIVE COLORECTAL CANCER SCREENING USING DNA-BASED STOOL TEST: Status: RESOLVED | Noted: 2018-10-15 | Resolved: 2018-12-05

## 2018-12-05 PROBLEM — N81.89 PELVIC RELAXATION: Status: ACTIVE | Noted: 2018-12-05

## 2018-12-09 LAB
BACTERIA #/AREA URNS HPF: NORMAL /HPF
BACTERIA UR CULT: ABNORMAL
BACTERIA UR CULT: ABNORMAL
CASTS URNS MICRO: NORMAL
EPI CELLS #/AREA URNS HPF: NORMAL /HPF
OTHER ANTIBIOTIC SUSC ISLT: ABNORMAL
RBC #/AREA URNS HPF: NORMAL /HPF
WBC #/AREA URNS HPF: NORMAL /HPF

## 2018-12-17 DIAGNOSIS — R10.2 PELVIC PAIN: Primary | ICD-10-CM

## 2018-12-21 ENCOUNTER — TELEPHONE (OUTPATIENT)
Dept: FAMILY MEDICINE CLINIC | Facility: CLINIC | Age: 68
End: 2018-12-21

## 2019-03-05 ENCOUNTER — OFFICE VISIT (OUTPATIENT)
Dept: FAMILY MEDICINE CLINIC | Facility: CLINIC | Age: 69
End: 2019-03-05

## 2019-03-05 VITALS
SYSTOLIC BLOOD PRESSURE: 120 MMHG | BODY MASS INDEX: 20.01 KG/M2 | HEART RATE: 82 BPM | OXYGEN SATURATION: 98 % | HEIGHT: 61 IN | TEMPERATURE: 98.7 F | RESPIRATION RATE: 12 BRPM | DIASTOLIC BLOOD PRESSURE: 80 MMHG | WEIGHT: 106 LBS

## 2019-03-05 DIAGNOSIS — G43.009 MIGRAINE WITHOUT AURA AND WITHOUT STATUS MIGRAINOSUS, NOT INTRACTABLE: ICD-10-CM

## 2019-03-05 DIAGNOSIS — M15.9 PRIMARY OSTEOARTHRITIS INVOLVING MULTIPLE JOINTS: ICD-10-CM

## 2019-03-05 DIAGNOSIS — I10 ESSENTIAL HYPERTENSION: ICD-10-CM

## 2019-03-05 DIAGNOSIS — E78.2 MIXED HYPERLIPIDEMIA: Primary | ICD-10-CM

## 2019-03-05 DIAGNOSIS — N32.89 BLADDER INSTABILITY: ICD-10-CM

## 2019-03-05 DIAGNOSIS — N81.89 PELVIC RELAXATION: ICD-10-CM

## 2019-03-05 DIAGNOSIS — M81.0 AGE-RELATED OSTEOPOROSIS WITHOUT CURRENT PATHOLOGICAL FRACTURE: ICD-10-CM

## 2019-03-05 DIAGNOSIS — Z00.00 HEALTHCARE MAINTENANCE: ICD-10-CM

## 2019-03-05 DIAGNOSIS — Z12.31 ENCOUNTER FOR SCREENING MAMMOGRAM FOR BREAST CANCER: ICD-10-CM

## 2019-03-05 DIAGNOSIS — E04.9 GOITER: ICD-10-CM

## 2019-03-05 DIAGNOSIS — F17.200 SMOKER: ICD-10-CM

## 2019-03-05 DIAGNOSIS — K21.9 GASTROESOPHAGEAL REFLUX DISEASE WITHOUT ESOPHAGITIS: ICD-10-CM

## 2019-03-05 DIAGNOSIS — K58.8 OTHER IRRITABLE BOWEL SYNDROME: ICD-10-CM

## 2019-03-05 DIAGNOSIS — J44.9 COPD MIXED TYPE (HCC): ICD-10-CM

## 2019-03-05 DIAGNOSIS — R73.03 PREDIABETES: ICD-10-CM

## 2019-03-05 LAB
25(OH)D3+25(OH)D2 SERPL-MCNC: 29 NG/ML
ALBUMIN SERPL-MCNC: 4.5 G/DL (ref 3.4–4.8)
ALBUMIN/GLOB SERPL: 1.4 G/DL (ref 1.5–2.5)
ALP SERPL-CCNC: 85 U/L (ref 35–104)
ALT SERPL-CCNC: 17 U/L (ref 10–36)
AST SERPL-CCNC: 18 U/L (ref 10–30)
BASOPHILS # BLD AUTO: 0.04 10*3/MM3 (ref 0–0.3)
BASOPHILS NFR BLD AUTO: 0.4 % (ref 0–2)
BILIRUB SERPL-MCNC: 0.3 MG/DL (ref 0.2–1.8)
BUN SERPL-MCNC: 12 MG/DL (ref 7–21)
BUN/CREAT SERPL: 16.4 (ref 7–25)
CALCIUM SERPL-MCNC: 9.4 MG/DL (ref 7.7–10)
CHLORIDE SERPL-SCNC: 112 MMOL/L (ref 99–112)
CHOLEST SERPL-MCNC: 185 MG/DL (ref 0–200)
CO2 SERPL-SCNC: 22.6 MMOL/L (ref 24.3–31.9)
CREAT SERPL-MCNC: 0.73 MG/DL (ref 0.43–1.29)
EOSINOPHIL # BLD AUTO: 0.11 10*3/MM3 (ref 0–0.7)
EOSINOPHIL NFR BLD AUTO: 1.2 % (ref 0–7)
ERYTHROCYTE [DISTWIDTH] IN BLOOD BY AUTOMATED COUNT: 13.8 % (ref 11.5–14.5)
GLOBULIN SER CALC-MCNC: 3.3 GM/DL
GLUCOSE SERPL-MCNC: 109 MG/DL (ref 70–110)
HBA1C MFR BLD: 5.2 % (ref 4.5–5.7)
HCT VFR BLD AUTO: 41.7 % (ref 37–47)
HDLC SERPL-MCNC: 72 MG/DL (ref 60–100)
HGB BLD-MCNC: 13.6 G/DL (ref 12–16)
IMM GRANULOCYTES # BLD AUTO: 0.02 10*3/MM3 (ref 0–0.03)
IMM GRANULOCYTES NFR BLD AUTO: 0.2 % (ref 0–0.5)
LDLC SERPL CALC-MCNC: 97 MG/DL (ref 0–100)
LYMPHOCYTES # BLD AUTO: 3.54 10*3/MM3 (ref 1–3)
LYMPHOCYTES NFR BLD AUTO: 39 % (ref 16–46)
MCH RBC QN AUTO: 31.9 PG (ref 27–33)
MCHC RBC AUTO-ENTMCNC: 32.6 G/DL (ref 33–37)
MCV RBC AUTO: 97.9 FL (ref 80–94)
MONOCYTES # BLD AUTO: 0.65 10*3/MM3 (ref 0.1–0.9)
MONOCYTES NFR BLD AUTO: 7.2 % (ref 0–12)
NEUTROPHILS # BLD AUTO: 4.72 10*3/MM3 (ref 1.4–6.5)
NEUTROPHILS NFR BLD AUTO: 52 % (ref 40–75)
PLATELET # BLD AUTO: 241 10*3/MM3 (ref 130–400)
POTASSIUM SERPL-SCNC: 3.4 MMOL/L (ref 3.5–5.3)
PROT SERPL-MCNC: 7.8 G/DL (ref 6–8)
RBC # BLD AUTO: 4.26 10*6/MM3 (ref 4.2–5.4)
SODIUM SERPL-SCNC: 139 MMOL/L (ref 135–153)
TRIGL SERPL-MCNC: 78 MG/DL (ref 0–150)
TSH SERPL DL<=0.005 MIU/L-ACNC: 9.77 MIU/ML (ref 0.55–4.78)
VLDLC SERPL CALC-MCNC: 15.6 MG/DL
WBC # BLD AUTO: 9.08 10*3/MM3 (ref 4.5–12.5)

## 2019-03-05 PROCEDURE — 99214 OFFICE O/P EST MOD 30 MIN: CPT | Performed by: GENERAL PRACTICE

## 2019-03-05 PROCEDURE — 36415 COLL VENOUS BLD VENIPUNCTURE: CPT | Performed by: GENERAL PRACTICE

## 2019-03-05 RX ORDER — RISEDRONATE SODIUM 150 MG/1
TABLET, FILM COATED ORAL
COMMUNITY
Start: 2018-12-04 | End: 2019-03-05

## 2019-03-05 RX ORDER — SPIRONOLACTONE 25 MG/1
25 TABLET ORAL EVERY MORNING
Qty: 30 TABLET | Refills: 5 | Status: SHIPPED | OUTPATIENT
Start: 2019-03-05 | End: 2019-05-17 | Stop reason: SDUPTHER

## 2019-03-05 NOTE — PROGRESS NOTES
Subjective   Albino Estrella is a 68 y.o. female.     History of Present Illness     Pelvic Pressure  She complains of persistent pelvic pressure, particularly with prolonged standing, pushing, pulling, or lifting. This is associated with urinary frequency, urgency, dysuria, and occasional incontinence. She denies any hematuria, vaginal bleeding or discharge, change in her bowel habits, hematochezia, melena, fever or chills. She has a history of recurrent urinary tract infections. Underwent a gynecology assessment last here with no significant abnormalities found on history or physical exam.  Offered a cystoscopy but has decided to defer this for now.    COPD  The patient reports that her SOB, cough and wheezing have remained at baseline since last here. She states that these symptoms occur at any time during the day or night. She reports that her symptoms become worse with activity. She remains on trelegy and feels that it has helped a lot. She rinses her mouth out after each use. The patient states she also has nasal congestion and post nasal drip/clearing throat. She is following the treatment plan, including medications as directed. She currently smokes approximately 1 packs per day. Low dose CT of the chest performed on 4/2/18 was reported as showing a 15 mm RLL pulmonary nodule, COPD, mild coronary artery vascular calcifications, and a cyst of the left upper kidney. PET scan performed on 4/27/18 revealed no hypermetabolism of the lesion noted on CT but a diffusely enlarged and hypermetabolic thyroid. U/S of the kidneys performed the same day was unremarkable. Follow up CT of the chest was performed on 9/25/18 and revealed a decrease in the RLL pulmonary nodule to 13 mm. The study was otherwise stable    Hypertension  Home blood pressure readings: not doing. Associated signs and symptoms: dyspnea. Patient denies: chest pain, palpitations, orthopnea, paroxysmal nocturnal dyspnea and peripheral edema. Current  antihypertensive medications includes diltiazem and hydrochlorothiazide. Medication compliance: taking as prescribed.      Dyslipidemia  Compliance with treatment has been fair. The patient exercises intermittently. She is currently being prescribed the following medication for her dyslipidemia - atorvastatin. Patient denies side effects associated with her medications. She has had no recent labs    The following portions of the patient's history were reviewed and updated as appropriate: allergies, current medications, past medical history, past social history and problem list.    Review of Systems   Constitutional: Positive for fatigue. Negative for appetite change, chills, fever and unexpected weight change.   HENT: Negative for congestion, ear pain, postnasal drip, sinus pressure, sneezing, sore throat and voice change.    Eyes: Negative for visual disturbance.   Respiratory: Positive for cough, shortness of breath and wheezing. Negative for stridor.    Cardiovascular: Negative for chest pain, palpitations and leg swelling.   Gastrointestinal: Negative for abdominal pain, blood in stool, constipation, diarrhea, nausea and vomiting.   Endocrine: Negative for polydipsia.   Genitourinary: Positive for dysuria (intermittent), frequency, pelvic pain (pressure) and urgency. Negative for difficulty urinating, hematuria, menstrual problem, vaginal bleeding and vaginal discharge.   Musculoskeletal: Negative for arthralgias, back pain, joint swelling, myalgias and neck pain.   Skin: Negative for color change.   Neurological: Positive for dizziness (chronic-intermittent - responds to meclizine) and headaches (chronic - intermittent - stable). Negative for tremors, weakness and numbness.   Psychiatric/Behavioral: Negative for dysphoric mood, sleep disturbance and suicidal ideas. The patient is not nervous/anxious.      Objective   Physical Exam   Constitutional: She is oriented to person, place, and time. No distress.    Bright and in good spirits. No apparent distress. No pallor, jaundice, diaphoresis, or cyanosis.     HENT:   Head: Atraumatic.   Right Ear: External ear normal.   Left Ear: External ear normal.   Nose: Nose normal.   Mouth/Throat: Oropharynx is clear and moist. Mucous membranes are not pale and not cyanotic.   Eyes: EOM are normal. Pupils are equal, round, and reactive to light. No scleral icterus.   Neck: No JVD present. Carotid bruit is not present. No tracheal deviation present. No thyromegaly present.   Cardiovascular: Normal rate, regular rhythm, S1 normal, S2 normal and intact distal pulses. Exam reveals no gallop, no S3 and no S4.   No murmur heard.  Pulmonary/Chest: No accessory muscle usage or stridor. No respiratory distress. She has decreased breath sounds. She has wheezes (mild).   Mild pulmonary hyperinflation   Abdominal: Soft. Normal aorta and bowel sounds are normal. She exhibits no distension, no abdominal bruit and no mass. There is no hepatosplenomegaly. There is no tenderness. No hernia.   Musculoskeletal: She exhibits no tenderness or deformity.     Vascular Status -  Her right foot exhibits no edema. Her left foot exhibits no edema.  Lymphadenopathy:        Head (right side): No submandibular adenopathy present.        Head (left side): No submandibular adenopathy present.     She has no cervical adenopathy.   Neurological: She is alert and oriented to person, place, and time. She has normal reflexes. She displays normal reflexes. No cranial nerve deficit. She exhibits normal muscle tone. Coordination normal.   Skin: Skin is warm and dry. No rash noted. She is not diaphoretic. No cyanosis. No pallor. Nails show no clubbing.   Psychiatric: She has a normal mood and affect.     Assessment/Plan   Problems Addressed this Visit        Cardiovascular and Mediastinum    Mixed hyperlipidemia   Encouraged to continue to work on her diet and exercise plan.  Continue current medication  Updated labs  drawn.    Relevant Orders    Lipid Panel (Completed)    TSH (Completed)    Essential hypertension  As above.   Continue current medication.    Relevant Orders    CBC & Differential (Completed)    Comprehensive Metabolic Panel (Completed)    Migraine without aura and without status migrainosus, not intractable       Respiratory    COPD mixed type (CMS/HCC)   COPD is worsening.  Reminded of the importance of smoking cessation  Encouraged to remain as active as symptoms allow for       Digestive    Gastroesophageal reflux disease without esophagitis    IBS (irritable bowel syndrome)       Musculoskeletal and Integument    Bladder instability    Primary osteoarthritis    Age-related osteoporosis without current pathological fracture  Encouraged to continue to pursue weight bearing activities while exercising joint protection.    Relevant Orders    Vitamin D 25 Hydroxy (Completed)       Genitourinary    Pelvic relaxation       Other    Prediabetes  Will continue to monitor    Relevant Orders    Hemoglobin A1c (Completed)    Healthcare maintenance  Reviewed the potential benefits and risks of hepatitis A immunizations. Patient will consider.  We will arrange an updated mammogram    Relevant Orders    Mammo Screening Digital Tomosynthesis Bilateral With CAD    Encounter for screening mammogram for breast cancer    Relevant Orders    Mammo Screening Digital Tomosynthesis Bilateral With CAD    Smoker

## 2019-03-07 NOTE — PROGRESS NOTES
Spoke with pt about the following per Dr. Alvarado.     -- Needs to start on spironolactone in place of hydrochlorothiazide -I have emailed a prescription to her pharmacy

## 2019-04-03 ENCOUNTER — APPOINTMENT (OUTPATIENT)
Dept: MAMMOGRAPHY | Facility: HOSPITAL | Age: 69
End: 2019-04-03

## 2019-04-12 ENCOUNTER — HOSPITAL ENCOUNTER (OUTPATIENT)
Dept: MAMMOGRAPHY | Facility: HOSPITAL | Age: 69
Discharge: HOME | End: 2019-04-12
Admitting: GENERAL PRACTICE

## 2019-04-12 DIAGNOSIS — Z12.31 ENCOUNTER FOR SCREENING MAMMOGRAM FOR BREAST CANCER: ICD-10-CM

## 2019-04-12 DIAGNOSIS — Z00.00 HEALTHCARE MAINTENANCE: ICD-10-CM

## 2019-04-12 PROCEDURE — 77063 BREAST TOMOSYNTHESIS BI: CPT

## 2019-04-12 PROCEDURE — 77067 SCR MAMMO BI INCL CAD: CPT

## 2019-04-12 PROCEDURE — 77067 SCR MAMMO BI INCL CAD: CPT | Performed by: RADIOLOGY

## 2019-04-12 PROCEDURE — 77063 BREAST TOMOSYNTHESIS BI: CPT | Performed by: RADIOLOGY

## 2019-05-17 DIAGNOSIS — N39.0 RECURRENT UTI: ICD-10-CM

## 2019-05-17 DIAGNOSIS — M80.00XA AGE-RELATED OSTEOPOROSIS WITH CURRENT PATHOLOGICAL FRACTURE, INITIAL ENCOUNTER: ICD-10-CM

## 2019-05-17 DIAGNOSIS — R42 VERTIGO: ICD-10-CM

## 2019-05-17 DIAGNOSIS — E78.2 MIXED HYPERLIPIDEMIA: ICD-10-CM

## 2019-05-17 DIAGNOSIS — G43.009 MIGRAINE WITHOUT AURA AND WITHOUT STATUS MIGRAINOSUS, NOT INTRACTABLE: ICD-10-CM

## 2019-05-17 DIAGNOSIS — N32.89 BLADDER INSTABILITY: ICD-10-CM

## 2019-05-17 DIAGNOSIS — J44.9 COPD MIXED TYPE (HCC): ICD-10-CM

## 2019-05-17 DIAGNOSIS — K21.9 GASTROESOPHAGEAL REFLUX DISEASE WITHOUT ESOPHAGITIS: ICD-10-CM

## 2019-05-17 DIAGNOSIS — Z00.00 HEALTHCARE MAINTENANCE: ICD-10-CM

## 2019-05-17 DIAGNOSIS — J44.9 CHRONIC OBSTRUCTIVE PULMONARY DISEASE, UNSPECIFIED COPD TYPE (HCC): ICD-10-CM

## 2019-05-17 DIAGNOSIS — I10 ESSENTIAL HYPERTENSION: ICD-10-CM

## 2019-05-17 RX ORDER — IPRATROPIUM BROMIDE AND ALBUTEROL SULFATE 2.5; .5 MG/3ML; MG/3ML
SOLUTION RESPIRATORY (INHALATION)
Qty: 540 ML | Refills: 0 | Status: SHIPPED | OUTPATIENT
Start: 2019-05-17 | End: 2019-05-17 | Stop reason: SDUPTHER

## 2019-05-20 RX ORDER — PANTOPRAZOLE SODIUM 40 MG/1
40 TABLET, DELAYED RELEASE ORAL 2 TIMES DAILY
Qty: 60 TABLET | Refills: 5 | Status: SHIPPED | OUTPATIENT
Start: 2019-05-20 | End: 2020-04-24 | Stop reason: SDUPTHER

## 2019-05-20 RX ORDER — RISEDRONATE SODIUM 150 MG/1
150 TABLET, FILM COATED ORAL
Qty: 1 TABLET | Refills: 5 | Status: SHIPPED | OUTPATIENT
Start: 2019-05-20 | End: 2020-04-24 | Stop reason: SDUPTHER

## 2019-05-20 RX ORDER — DILTIAZEM HYDROCHLORIDE 360 MG/1
360 CAPSULE, EXTENDED RELEASE ORAL DAILY
Qty: 30 CAPSULE | Refills: 5 | Status: SHIPPED | OUTPATIENT
Start: 2019-05-20 | End: 2020-04-24 | Stop reason: SDUPTHER

## 2019-05-20 RX ORDER — TOLTERODINE 4 MG/1
4 CAPSULE, EXTENDED RELEASE ORAL NIGHTLY
Qty: 30 CAPSULE | Refills: 5 | Status: SHIPPED | OUTPATIENT
Start: 2019-05-20 | End: 2019-10-11

## 2019-05-20 RX ORDER — MONTELUKAST SODIUM 10 MG/1
10 TABLET ORAL NIGHTLY
Qty: 30 TABLET | Refills: 5 | Status: SHIPPED | OUTPATIENT
Start: 2019-05-20 | End: 2020-04-24 | Stop reason: SDUPTHER

## 2019-05-20 RX ORDER — MECLIZINE HYDROCHLORIDE 25 MG/1
25 TABLET ORAL 3 TIMES DAILY PRN
Qty: 90 TABLET | Refills: 5 | Status: SHIPPED | OUTPATIENT
Start: 2019-05-20 | End: 2020-04-24 | Stop reason: SDUPTHER

## 2019-05-20 RX ORDER — SPIRONOLACTONE 25 MG/1
25 TABLET ORAL EVERY MORNING
Qty: 30 TABLET | Refills: 5 | Status: SHIPPED | OUTPATIENT
Start: 2019-05-20 | End: 2020-04-24 | Stop reason: SDUPTHER

## 2019-05-20 RX ORDER — ESTRADIOL 0.1 MG/G
2 CREAM VAGINAL 2 TIMES WEEKLY
Qty: 20 G | Refills: 5 | Status: SHIPPED | OUTPATIENT
Start: 2019-05-20 | End: 2020-04-24 | Stop reason: SDUPTHER

## 2019-05-20 RX ORDER — SUMATRIPTAN 100 MG/1
100 TABLET, FILM COATED ORAL ONCE AS NEEDED
Qty: 9 TABLET | Refills: 5 | Status: SHIPPED | OUTPATIENT
Start: 2019-05-20 | End: 2020-04-24 | Stop reason: SDUPTHER

## 2019-05-20 RX ORDER — ATORVASTATIN CALCIUM 40 MG/1
40 TABLET, FILM COATED ORAL NIGHTLY
Qty: 30 TABLET | Refills: 5 | Status: SHIPPED | OUTPATIENT
Start: 2019-05-20 | End: 2020-04-24 | Stop reason: SDUPTHER

## 2019-05-20 RX ORDER — LANOLIN ALCOHOL/MO/W.PET/CERES
1000 CREAM (GRAM) TOPICAL DAILY
Qty: 30 TABLET | Refills: 5 | Status: SHIPPED | OUTPATIENT
Start: 2019-05-20 | End: 2020-04-24 | Stop reason: SDUPTHER

## 2019-05-20 RX ORDER — IPRATROPIUM BROMIDE AND ALBUTEROL SULFATE 2.5; .5 MG/3ML; MG/3ML
3 SOLUTION RESPIRATORY (INHALATION) 4 TIMES DAILY PRN
Qty: 540 ML | Refills: 5 | Status: SHIPPED | OUTPATIENT
Start: 2019-05-20 | End: 2020-04-24 | Stop reason: SDUPTHER

## 2019-07-08 ENCOUNTER — OFFICE VISIT (OUTPATIENT)
Dept: FAMILY MEDICINE CLINIC | Facility: CLINIC | Age: 69
End: 2019-07-08

## 2019-07-08 VITALS
DIASTOLIC BLOOD PRESSURE: 70 MMHG | TEMPERATURE: 98.8 F | HEART RATE: 78 BPM | HEIGHT: 61 IN | WEIGHT: 104 LBS | BODY MASS INDEX: 19.63 KG/M2 | RESPIRATION RATE: 12 BRPM | OXYGEN SATURATION: 98 % | SYSTOLIC BLOOD PRESSURE: 130 MMHG

## 2019-07-08 DIAGNOSIS — M15.9 PRIMARY OSTEOARTHRITIS INVOLVING MULTIPLE JOINTS: ICD-10-CM

## 2019-07-08 DIAGNOSIS — N81.89 PELVIC RELAXATION: ICD-10-CM

## 2019-07-08 DIAGNOSIS — N32.89 BLADDER INSTABILITY: ICD-10-CM

## 2019-07-08 DIAGNOSIS — I10 ESSENTIAL HYPERTENSION: ICD-10-CM

## 2019-07-08 DIAGNOSIS — G43.009 MIGRAINE WITHOUT AURA AND WITHOUT STATUS MIGRAINOSUS, NOT INTRACTABLE: ICD-10-CM

## 2019-07-08 DIAGNOSIS — F43.21 GRIEF REACTION: ICD-10-CM

## 2019-07-08 DIAGNOSIS — K21.9 GASTROESOPHAGEAL REFLUX DISEASE WITHOUT ESOPHAGITIS: ICD-10-CM

## 2019-07-08 DIAGNOSIS — Z00.00 HEALTHCARE MAINTENANCE: ICD-10-CM

## 2019-07-08 DIAGNOSIS — E78.2 MIXED HYPERLIPIDEMIA: Primary | ICD-10-CM

## 2019-07-08 DIAGNOSIS — M81.0 AGE-RELATED OSTEOPOROSIS WITHOUT CURRENT PATHOLOGICAL FRACTURE: ICD-10-CM

## 2019-07-08 DIAGNOSIS — J44.9 COPD MIXED TYPE (HCC): ICD-10-CM

## 2019-07-08 DIAGNOSIS — F17.200 SMOKER: ICD-10-CM

## 2019-07-08 DIAGNOSIS — R73.03 PREDIABETES: ICD-10-CM

## 2019-07-08 DIAGNOSIS — E04.9 GOITER: ICD-10-CM

## 2019-07-08 DIAGNOSIS — K58.8 OTHER IRRITABLE BOWEL SYNDROME: ICD-10-CM

## 2019-07-08 DIAGNOSIS — R79.89 ELEVATED TSH: ICD-10-CM

## 2019-07-08 PROBLEM — F43.20 GRIEF REACTION: Status: ACTIVE | Noted: 2019-07-08

## 2019-07-08 PROCEDURE — 99214 OFFICE O/P EST MOD 30 MIN: CPT | Performed by: GENERAL PRACTICE

## 2019-07-08 PROCEDURE — 36415 COLL VENOUS BLD VENIPUNCTURE: CPT | Performed by: GENERAL PRACTICE

## 2019-07-08 NOTE — PROGRESS NOTES
Subjective   Albino Estrella is a 68 y.o. female.     History of Present Illness     Grief Reaction  Her mother  several weeks ago.  This has been difficult but she feels that she is coping.  There is no history of any suicidal ideation.  She has a close and supportive family.    COPD  The patient reports that her SOB, cough and wheezing have remained at baseline since last here. She states that these symptoms occur at any time during the day or night. She reports that her symptoms become worse with activity. She remains on trelegy and feels that it has helped a lot. She rinses her mouth out after each use. The patient states she also has nasal congestion and post nasal drip/clearing throat. She is following the treatment plan, including medications as directed. She currently smokes approximately 1 packs per day. Low dose CT of the chest performed on 18 was reported as showing a 15 mm RLL pulmonary nodule, COPD, mild coronary artery vascular calcifications, and a cyst of the left upper kidney. PET scan performed on 18 revealed no hypermetabolism of the lesion noted on CT but a diffusely enlarged and hypermetabolic thyroid. U/S of the kidneys performed the same day was unremarkable. Follow up CT of the chest was performed on 18 and revealed a decrease in the RLL pulmonary nodule to 13 mm. The study was otherwise stable    Hypertension  Home blood pressure readings: not doing. Associated signs and symptoms: dyspnea. Patient denies: chest pain, palpitations, orthopnea, paroxysmal nocturnal dyspnea and peripheral edema. Current antihypertensive medications includes diltiazem and spironolactone.  She was changed from hydrochlorothiazide to the latter following her most recent labs  Lab Results   Component Value Date    CREATININE 0.73 2019     Lab Results   Component Value Date    K 3.4 (L) 2019     Dyslipidemia  Compliance with treatment has been fair. The patient exercises intermittently.  She is currently being prescribed the following medication for her dyslipidemia - atorvastatin. Patient denies side effects associated with her medications  Lab Results   Component Value Date    CHOL 197 01/12/2017    CHLPL 185 03/05/2019    TRIG 78 03/05/2019    HDL 72 03/05/2019    LDL 97 03/05/2019     Pelvic Pressure  She complains of persistent pelvic pressure, particularly with prolonged standing, pushing, pulling, or lifting. This is associated with urinary frequency, urgency, dysuria, and occasional incontinence. She denies any hematuria, vaginal bleeding or discharge, change in her bowel habits, hematochezia, melena, fever or chills. She has a history of recurrent urinary tract infections. Underwent a gynecology assessment with no significant abnormalities found on history or physical exam.  Offered a cystoscopy but has decided to defer this for now.    Labs  Most recent  with an A1c of 5.2. TSH 9.77. Vitamin D 29    The following portions of the patient's history were reviewed and updated as appropriate: allergies, current medications, past medical history, past social history and problem list.    Review of Systems   Constitutional: Positive for fatigue. Negative for appetite change, chills, fever and unexpected weight change.   HENT: Negative for congestion, ear pain, postnasal drip, sinus pressure, sneezing, sore throat and voice change.    Eyes: Negative for visual disturbance.   Respiratory: Positive for cough, shortness of breath and wheezing. Negative for stridor.    Cardiovascular: Negative for chest pain, palpitations and leg swelling.   Gastrointestinal: Negative for abdominal pain, blood in stool, constipation, diarrhea, nausea and vomiting.   Endocrine: Negative for polydipsia.   Genitourinary: Positive for dysuria (intermittent), frequency, pelvic pain (pressure) and urgency. Negative for difficulty urinating, hematuria, menstrual problem, vaginal bleeding and vaginal discharge.    Musculoskeletal: Negative for arthralgias, back pain, joint swelling, myalgias and neck pain.   Skin: Negative for color change.   Neurological: Positive for dizziness (chronic-intermittent - responds to meclizine) and headaches (chronic - intermittent - stable). Negative for tremors, weakness and numbness.   Psychiatric/Behavioral: Positive for dysphoric mood. Negative for sleep disturbance and suicidal ideas. The patient is not nervous/anxious.      Objective   Physical Exam   Constitutional: She is oriented to person, place, and time. No distress.   Alert and in fair spirits. No apparent distress. No pallor, jaundice, diaphoresis, or cyanosis.     HENT:   Head: Atraumatic.   Right Ear: External ear normal.   Left Ear: External ear normal.   Nose: Nose normal.   Mouth/Throat: Oropharynx is clear and moist. Mucous membranes are not pale and not cyanotic.   Eyes: EOM are normal. Pupils are equal, round, and reactive to light. No scleral icterus.   Neck: No JVD present. Carotid bruit is not present. No tracheal deviation present. No thyromegaly present.   Cardiovascular: Normal rate, regular rhythm, S1 normal, S2 normal and intact distal pulses. Exam reveals no gallop, no S3 and no S4.   No murmur heard.  Pulmonary/Chest: No accessory muscle usage or stridor. No respiratory distress. She has decreased breath sounds. She has wheezes (mild).   Mild pulmonary hyperinflation   Abdominal: Soft. Normal aorta and bowel sounds are normal. She exhibits no distension, no abdominal bruit and no mass. There is no hepatosplenomegaly. There is no tenderness. No hernia.   Musculoskeletal: She exhibits no tenderness or deformity.     Vascular Status -  Her right foot exhibits no edema. Her left foot exhibits no edema.  Lymphadenopathy:        Head (right side): No submandibular adenopathy present.        Head (left side): No submandibular adenopathy present.     She has no cervical adenopathy.   Neurological: She is alert and  oriented to person, place, and time. She has normal reflexes. She displays normal reflexes. No cranial nerve deficit. She exhibits normal muscle tone. Coordination normal.   Skin: Skin is warm and dry. No rash noted. She is not diaphoretic. No cyanosis. No pallor. Nails show no clubbing.   Psychiatric: She has a normal mood and affect. Her speech is normal and behavior is normal. Thought content normal. She expresses no suicidal ideation.     Assessment/Plan   Problems Addressed this Visit        Cardiovascular and Mediastinum    Mixed hyperlipidemia   Encouraged to continue to work on her diet and exercise plan.  Continue current medication    Essential hypertension   Hypertension: at goal. Evidence of target organ damage: none.   As above  Continue current medication  Updated labs drawn.    Relevant Orders    Comprehensive Metabolic Panel    Migraine without aura and without status migrainosus, not intractable       Respiratory    COPD mixed type (CMS/HCC)   COPD is stable.  Reminded of the importance of smoking cessation  Encouraged to remain as active as symptoms allow for   Continue current medication       Digestive    Gastroesophageal reflux disease without esophagitis    IBS (irritable bowel syndrome)       Endocrine    Goiter    Relevant Orders    TSH       Musculoskeletal and Integument    Bladder instability    Primary osteoarthritis    Age-related osteoporosis without current pathological fracture  Encouraged to continue to pursue weight bearing activities while exercising joint protection.       Genitourinary    Pelvic relaxation  Encouraged to report if any worse or if any new symptoms or concerns.       Other    Prediabetes  Will continue to monitor    Healthcare maintenance  Reminded to get a flu shot when available.    Smoker    Elevated TSH  Will continue to monitor    Relevant Orders    TSH    Grief reaction  Uncomplicated  Supportive therapy  Encouraged to report if any worse or if any new  symptoms or concerns.

## 2019-07-09 LAB
ALBUMIN SERPL-MCNC: 4.2 G/DL (ref 3.5–5.2)
ALBUMIN/GLOB SERPL: 1.3 G/DL
ALP SERPL-CCNC: 85 U/L (ref 39–117)
ALT SERPL-CCNC: 9 U/L (ref 1–33)
AST SERPL-CCNC: 14 U/L (ref 1–32)
BILIRUB SERPL-MCNC: 0.2 MG/DL (ref 0.2–1.2)
BUN SERPL-MCNC: 10 MG/DL (ref 8–23)
BUN/CREAT SERPL: 13.9 (ref 7–25)
CALCIUM SERPL-MCNC: 9.2 MG/DL (ref 8.6–10.5)
CHLORIDE SERPL-SCNC: 100 MMOL/L (ref 98–107)
CO2 SERPL-SCNC: 25.4 MMOL/L (ref 22–29)
CREAT SERPL-MCNC: 0.72 MG/DL (ref 0.57–1)
GLOBULIN SER CALC-MCNC: 3.3 GM/DL
GLUCOSE SERPL-MCNC: 98 MG/DL (ref 65–99)
POTASSIUM SERPL-SCNC: 4.3 MMOL/L (ref 3.5–5.2)
PROT SERPL-MCNC: 7.5 G/DL (ref 6–8.5)
SODIUM SERPL-SCNC: 136 MMOL/L (ref 136–145)
TSH SERPL DL<=0.005 MIU/L-ACNC: 8.85 UIU/ML (ref 0.45–4.5)

## 2019-07-23 ENCOUNTER — OFFICE VISIT (OUTPATIENT)
Dept: FAMILY MEDICINE CLINIC | Facility: CLINIC | Age: 69
End: 2019-07-23

## 2019-07-23 VITALS
TEMPERATURE: 98.9 F | HEIGHT: 61 IN | WEIGHT: 104 LBS | BODY MASS INDEX: 19.63 KG/M2 | OXYGEN SATURATION: 98 % | DIASTOLIC BLOOD PRESSURE: 88 MMHG | HEART RATE: 93 BPM | SYSTOLIC BLOOD PRESSURE: 130 MMHG

## 2019-07-23 DIAGNOSIS — L02.91 ABSCESS: Primary | ICD-10-CM

## 2019-07-23 PROCEDURE — 99214 OFFICE O/P EST MOD 30 MIN: CPT | Performed by: NURSE PRACTITIONER

## 2019-07-23 RX ORDER — SULFAMETHOXAZOLE AND TRIMETHOPRIM 800; 160 MG/1; MG/1
1 TABLET ORAL 2 TIMES DAILY
Qty: 20 TABLET | Refills: 0 | Status: SHIPPED | OUTPATIENT
Start: 2019-07-23 | End: 2019-08-02

## 2019-07-23 NOTE — PROGRESS NOTES
"Aicha Estrella is a 68 y.o. female.     Chief Complaint   Patient presents with   • Insect Bite       History of Present Illness:    Insect bite upper lip. Started Saturday while she was cleaning out her mothers home after her recent death. Area is sore, not sure if it is an insect bite or if started with a scratch. No fever. Lip has swollen some but having no difficulty swallowing.       The following portions of the patient's history were reviewed and updated as appropriate:  Allergies, current medications, past family history, past medical history, past social history, past surgical history and problem list.    Review of Systems   Constitutional: Negative for appetite change, fatigue and unexpected weight change.   HENT: Negative for congestion, ear pain, nosebleeds, postnasal drip, rhinorrhea, sore throat, trouble swallowing and voice change.    Eyes: Negative for pain and visual disturbance.   Respiratory: Negative for cough, shortness of breath and wheezing.    Cardiovascular: Negative for chest pain and palpitations.   Gastrointestinal: Negative for abdominal pain, blood in stool, constipation and diarrhea.   Endocrine: Negative for cold intolerance and polydipsia.   Genitourinary: Negative for difficulty urinating, flank pain and hematuria.   Musculoskeletal: Negative for arthralgias, back pain, gait problem, joint swelling and myalgias.   Skin: Positive for color change and wound. Negative for rash.   Allergic/Immunologic: Negative.    Neurological: Negative for syncope, numbness and headaches.   Hematological: Negative.    Psychiatric/Behavioral: Negative for sleep disturbance and suicidal ideas.   All other systems reviewed and are negative.      Objective     /88   Pulse 93   Temp 98.9 °F (37.2 °C) (Temporal)   Ht 154.9 cm (61\")   Wt 47.2 kg (104 lb)   LMP  (LMP Unknown)   SpO2 98%   BMI 19.65 kg/m²   Office Visit on 07/08/2019   Component Date Value Ref Range Status   • Glucose " 07/08/2019 98  65 - 99 mg/dL Final   • BUN 07/08/2019 10  8 - 23 mg/dL Final   • Creatinine 07/08/2019 0.72  0.57 - 1.00 mg/dL Final   • eGFR Non  Am 07/08/2019 81  >60 mL/min/1.73 Final   • eGFR African Am 07/08/2019 98  >60 mL/min/1.73 Final   • BUN/Creatinine Ratio 07/08/2019 13.9  7.0 - 25.0 Final   • Sodium 07/08/2019 136  136 - 145 mmol/L Final   • Potassium 07/08/2019 4.3  3.5 - 5.2 mmol/L Final   • Chloride 07/08/2019 100  98 - 107 mmol/L Final   • Total CO2 07/08/2019 25.4  22.0 - 29.0 mmol/L Final   • Calcium 07/08/2019 9.2  8.6 - 10.5 mg/dL Final   • Total Protein 07/08/2019 7.5  6.0 - 8.5 g/dL Final   • Albumin 07/08/2019 4.20  3.50 - 5.20 g/dL Final   • Globulin 07/08/2019 3.3  gm/dL Final   • A/G Ratio 07/08/2019 1.3  g/dL Final   • Total Bilirubin 07/08/2019 0.2  0.2 - 1.2 mg/dL Final   • Alkaline Phosphatase 07/08/2019 85  39 - 117 U/L Final   • AST (SGOT) 07/08/2019 14  1 - 32 U/L Final   • ALT (SGPT) 07/08/2019 9  1 - 33 U/L Final   • TSH 07/08/2019 8.850* 0.450 - 4.500 uIU/mL Final       Physical Exam   Constitutional: She is oriented to person, place, and time. Vital signs are normal. She appears well-developed and well-nourished. No distress.   Talkative and friendly 68-year-old female in no acute distress.   HENT:   Head: Normocephalic.   Right Ear: External ear normal.   Left Ear: External ear normal.   Nose: Nose normal.   Mouth/Throat: Oropharynx is clear and moist. No oropharyngeal exudate.       Eyes: Conjunctivae, EOM and lids are normal. Pupils are equal, round, and reactive to light. Right eye exhibits no discharge. Left eye exhibits no discharge.   Neck: Normal range of motion. Neck supple. No tracheal deviation present. No thyromegaly present.   Cardiovascular: Normal rate, regular rhythm and normal heart sounds. Exam reveals no gallop and no friction rub.   No murmur heard.  Pulmonary/Chest: Effort normal and breath sounds normal. No respiratory distress. She has no wheezes.  She has no rales. She exhibits no tenderness.   Abdominal: Soft. Normal appearance and bowel sounds are normal. She exhibits no distension and no mass. There is no tenderness. There is no rebound and no guarding.   Musculoskeletal: Normal range of motion.   General stiffness in weightbearing joints.General stiffness in weightbearing joints.     Lymphadenopathy:     She has no cervical adenopathy.   Neurological: She is alert and oriented to person, place, and time. She has normal reflexes.   CN 2-12 grossly intact    Skin: Skin is warm and dry. Capillary refill takes less than 2 seconds. No rash noted. She is not diaphoretic. No erythema.   Psychiatric: She has a normal mood and affect. Her speech is normal and behavior is normal. Judgment and thought content normal. Cognition and memory are normal.   Vitals reviewed.      Assessment/Plan     Problem List Items Addressed This Visit        Other    Abscess - Primary    Overview     Upper lip.  Questionable insect bite.         Current Assessment & Plan     Clean daily with soap and water, rinse well and pat dry.  Apply Bactroban ointment as directed.  Start Bactrim DS twice daily.  Apply warm compresses and express any discharge as available.  Report any worsening of symptoms such as but not limited to erythema, drainage, chills or fever.  Understanding stated.         Relevant Medications    sulfamethoxazole-trimethoprim (BACTRIM DS,SEPTRA DS) 800-160 MG per tablet    mupirocin (BACTROBAN) 2 % ointment                   Patient's Body mass index is 19.65 kg/m². BMI is within normal parameters. No follow-up required..          I have discussed diagnosis in detail today allowing time for questions and answers. Patient is aware of reasons to seek urgent or emergent medical care as well as reasons to return to the clinic for evaluation. Possible side effects, interactions and progression of symptoms discussed as well. Patient / family states understanding.   Emotional  support and active listening provided.       RTC 2-5 days if not improved, sooner if condition worsens/changes. Symptomatic care advised as well as reasons for urgent or emergent care. Pt / family state understanding.     Pt will follow up this week if not improved.     Dictated utilizing Dragon dictation        This document has been electronically signed by:  RENO Jacques, NP-C

## 2019-07-23 NOTE — ASSESSMENT & PLAN NOTE
Clean daily with soap and water, rinse well and pat dry.  Apply Bactroban ointment as directed.  Start Bactrim DS twice daily.  Apply warm compresses and express any discharge as available.  Report any worsening of symptoms such as but not limited to erythema, drainage, chills or fever.  Understanding stated.

## 2019-07-27 RX ORDER — RIFAMPIN 150 MG/1
150 CAPSULE ORAL 3 TIMES DAILY
Qty: 6 CAPSULE | Refills: 0 | Status: SHIPPED | OUTPATIENT
Start: 2019-07-27 | End: 2020-04-24

## 2019-10-11 ENCOUNTER — OFFICE VISIT (OUTPATIENT)
Dept: FAMILY MEDICINE CLINIC | Facility: CLINIC | Age: 69
End: 2019-10-11

## 2019-10-11 DIAGNOSIS — E04.9 GOITER: ICD-10-CM

## 2019-10-11 DIAGNOSIS — Z23 ENCOUNTER FOR IMMUNIZATION: ICD-10-CM

## 2019-10-11 DIAGNOSIS — I10 ESSENTIAL HYPERTENSION: ICD-10-CM

## 2019-10-11 DIAGNOSIS — R79.89 ELEVATED TSH: ICD-10-CM

## 2019-10-11 DIAGNOSIS — Z87.440 H/O RECURRENT URINARY TRACT INFECTION: ICD-10-CM

## 2019-10-11 DIAGNOSIS — M81.0 AGE-RELATED OSTEOPOROSIS WITHOUT CURRENT PATHOLOGICAL FRACTURE: ICD-10-CM

## 2019-10-11 DIAGNOSIS — K58.8 OTHER IRRITABLE BOWEL SYNDROME: ICD-10-CM

## 2019-10-11 DIAGNOSIS — N81.89 PELVIC RELAXATION: ICD-10-CM

## 2019-10-11 DIAGNOSIS — Z00.00 HEALTHCARE MAINTENANCE: ICD-10-CM

## 2019-10-11 DIAGNOSIS — N32.89 BLADDER INSTABILITY: ICD-10-CM

## 2019-10-11 DIAGNOSIS — G43.009 MIGRAINE WITHOUT AURA AND WITHOUT STATUS MIGRAINOSUS, NOT INTRACTABLE: ICD-10-CM

## 2019-10-11 DIAGNOSIS — R73.03 PREDIABETES: ICD-10-CM

## 2019-10-11 DIAGNOSIS — J44.9 COPD MIXED TYPE (HCC): ICD-10-CM

## 2019-10-11 DIAGNOSIS — F17.200 SMOKER: ICD-10-CM

## 2019-10-11 DIAGNOSIS — M15.9 PRIMARY OSTEOARTHRITIS INVOLVING MULTIPLE JOINTS: ICD-10-CM

## 2019-10-11 DIAGNOSIS — K21.9 GASTROESOPHAGEAL REFLUX DISEASE WITHOUT ESOPHAGITIS: ICD-10-CM

## 2019-10-11 DIAGNOSIS — E78.2 MIXED HYPERLIPIDEMIA: Primary | ICD-10-CM

## 2019-10-11 PROBLEM — L02.91 ABSCESS: Status: RESOLVED | Noted: 2019-07-23 | Resolved: 2019-10-11

## 2019-10-11 PROCEDURE — 90674 CCIIV4 VAC NO PRSV 0.5 ML IM: CPT | Performed by: GENERAL PRACTICE

## 2019-10-11 PROCEDURE — 99214 OFFICE O/P EST MOD 30 MIN: CPT | Performed by: GENERAL PRACTICE

## 2019-10-11 PROCEDURE — G0008 ADMIN INFLUENZA VIRUS VAC: HCPCS | Performed by: GENERAL PRACTICE

## 2019-10-11 NOTE — PROGRESS NOTES
Subjective   Albino Estrella is a 68 y.o. female.     History of Present Illness     COPD  The patient reports that her SOB, cough and wheezing have been somewhat better through summer and now fall. She states that these symptoms occur at any time during the day or night. She reports that her symptoms become worse with activity. She remains on trelegy and feels that it has helped a lot. She rinses her mouth out after each use. The patient states she also has nasal congestion and post nasal drip/clearing throat. She is following the treatment plan, including medications as directed. She continues to smoke approximately 1 packs per day. Low dose CT of the chest performed on 4/2/18 was reported as showing a 15 mm RLL pulmonary nodule, COPD, mild coronary artery vascular calcifications, and a cyst of the left upper kidney. PET scan performed on 4/27/18 revealed no hypermetabolism of the lesion noted on CT but a diffusely enlarged and hypermetabolic thyroid. U/S of the kidneys performed the same day was unremarkable. Follow up CT of the chest was performed on 9/25/18 and revealed a decrease in the RLL pulmonary nodule to 13 mm. The study was otherwise stable    Hypertension  Home blood pressure readings: not doing. Associated signs and symptoms: dyspnea. Patient denies: chest pain, palpitations, orthopnea, paroxysmal nocturnal dyspnea and peripheral edema. Current antihypertensive medications includes diltiazem and spironolactone.   Lab Results   Component Value Date    CREATININE 0.72 07/08/2019     Lab Results   Component Value Date    K 4.3 07/08/2019     Dyslipidemia  Compliance with treatment has been fair. The patient exercises intermittently. She is currently being prescribed the following medication for her dyslipidemia - atorvastatin. Patient denies side effects associated with her medications    Pelvic Pressure  She complains of persistent pelvic pressure, particularly with prolonged standing, pushing, pulling,  or lifting. This is associated with urinary frequency, urgency, dysuria, and occasional incontinence. She denies any hematuria, vaginal bleeding or discharge, change in her bowel habits, hematochezia, melena, fever or chills. She has a history of recurrent urinary tract infections.  She stopped tolterodine due to a lack of effect and increased fatigue.  Underwent a gynecology assessment with no significant abnormalities found on history or physical exam.  Offered a cystoscopy but has decided to defer this for now.    Labs  Lab Results   Component Value Date    TSH 8.850 (H) 07/08/2019     The following portions of the patient's history were reviewed and updated as appropriate: allergies, current medications, past medical history, past social history and problem list.    Review of Systems   Constitutional: Positive for fatigue. Negative for appetite change, chills, fever and unexpected weight change.   HENT: Negative for congestion, ear pain, postnasal drip, sinus pressure, sneezing, sore throat and voice change.    Eyes: Negative for visual disturbance.   Respiratory: Positive for cough, shortness of breath and wheezing. Negative for stridor.    Cardiovascular: Negative for chest pain, palpitations and leg swelling.   Gastrointestinal: Negative for abdominal pain, blood in stool, constipation, diarrhea, nausea and vomiting.   Endocrine: Negative for polydipsia.   Genitourinary: Positive for dysuria (intermittent), frequency, pelvic pain (pressure) and urgency. Negative for difficulty urinating, hematuria, menstrual problem, vaginal bleeding and vaginal discharge.   Musculoskeletal: Negative for arthralgias, back pain, joint swelling, myalgias and neck pain.   Skin: Negative for color change.   Neurological: Positive for dizziness (chronic-intermittent - responds to meclizine) and headaches (chronic - intermittent - stable). Negative for tremors, weakness and numbness.   Psychiatric/Behavioral: Positive for dysphoric  mood. Negative for sleep disturbance and suicidal ideas. The patient is not nervous/anxious.      Objective   Physical Exam   Constitutional: She is oriented to person, place, and time. No distress.   Bright and in good spirits. No apparent distress. No pallor, jaundice, diaphoresis, or cyanosis.   HENT:   Head: Atraumatic.   Right Ear: External ear normal.   Left Ear: External ear normal.   Nose: Nose normal.   Mouth/Throat: Oropharynx is clear and moist. Mucous membranes are not pale and not cyanotic.   Eyes: EOM are normal. Pupils are equal, round, and reactive to light. No scleral icterus.   Neck: No JVD present. Carotid bruit is not present. No tracheal deviation present. No thyromegaly present.   Cardiovascular: Normal rate, regular rhythm, S1 normal, S2 normal and intact distal pulses. Exam reveals no gallop, no S3 and no S4.   No murmur heard.  Pulmonary/Chest: No accessory muscle usage or stridor. No respiratory distress. She has decreased breath sounds. She has wheezes (mild).   Mild pulmonary hyperinflation   Abdominal: Soft. Normal aorta and bowel sounds are normal. She exhibits no distension, no abdominal bruit and no mass. There is no hepatosplenomegaly. There is no tenderness. No hernia.   Musculoskeletal: She exhibits no tenderness or deformity.     Vascular Status -  Her right foot exhibits no edema. Her left foot exhibits no edema.  Lymphadenopathy:        Head (right side): No submandibular adenopathy present.        Head (left side): No submandibular adenopathy present.     She has no cervical adenopathy.   Neurological: She is alert and oriented to person, place, and time. She has normal reflexes. She displays normal reflexes. No cranial nerve deficit. She exhibits normal muscle tone. Coordination normal.   Skin: Skin is warm and dry. No rash noted. She is not diaphoretic. No cyanosis. No pallor. Nails show no clubbing.   Psychiatric: She has a normal mood and affect. Her speech is normal and  behavior is normal. Thought content normal. She expresses no suicidal ideation.     Assessment/Plan   Problems Addressed this Visit        Cardiovascular and Mediastinum    Mixed hyperlipidemia  Encouraged to continue to work on her diet and exercise plan.  Continue current medication    Essential hypertension   Hypertension: at goal. Evidence of target organ damage: none.   As above  Continue current medication   Updated labs will be drawn at her return.    Migraine without aura and without status migrainosus, not intractable       Respiratory    COPD mixed type (CMS/HCC)   COPD is stable.  Reminded of the importance of smoking cessation  Encouraged to remain as active as symptoms allow for  Continue current medication       Digestive    Gastroesophageal reflux disease without esophagitis    IBS (irritable bowel syndrome)       Endocrine    Goiter  Clinically euthyroid.  Will continue to monitor       Musculoskeletal and Integument    Primary osteoarthritis    Pelvic relaxation    Age-related osteoporosis without current pathological fracture       Genitourinary    Bladder instability  Patient remains uninterested in a gynecology or urology reassessment       Other    H/O recurrent urinary tract infection    Prediabetes  Will continue to monitor    Encounter for immunization    Relevant Orders    Flucelvax Quad=>4Years (PFS) (Completed)    Healthcare maintenance  Recommended a flu shot  Advised that she is due for an updated low-dose CT of the chest.  Patient would like to defer this to the time of her mammogram in the spring.  Will also consider updating a DEXA scan at that point    Relevant Orders    Flucelvax Quad=>4Years (PFS) (Completed)    Smoker    Elevated TSH  Will continue to monitor

## 2019-10-12 VITALS
RESPIRATION RATE: 12 BRPM | HEIGHT: 61 IN | DIASTOLIC BLOOD PRESSURE: 80 MMHG | TEMPERATURE: 98.9 F | BODY MASS INDEX: 20.01 KG/M2 | WEIGHT: 106 LBS | HEART RATE: 72 BPM | SYSTOLIC BLOOD PRESSURE: 125 MMHG

## 2019-10-12 PROBLEM — R42 VERTIGO: Status: RESOLVED | Noted: 2017-05-12 | Resolved: 2019-10-12

## 2019-10-12 PROBLEM — F43.20 GRIEF REACTION: Status: RESOLVED | Noted: 2019-07-08 | Resolved: 2019-10-12

## 2019-10-12 PROBLEM — F43.21 GRIEF REACTION: Status: RESOLVED | Noted: 2019-07-08 | Resolved: 2019-10-12

## 2020-01-06 RX ORDER — DOXYCYCLINE HYCLATE 100 MG/1
100 CAPSULE ORAL 2 TIMES DAILY
Qty: 20 CAPSULE | Refills: 0 | Status: SHIPPED | OUTPATIENT
Start: 2020-01-06 | End: 2020-01-16

## 2020-01-14 ENCOUNTER — OFFICE VISIT (OUTPATIENT)
Dept: FAMILY MEDICINE CLINIC | Facility: CLINIC | Age: 70
End: 2020-01-14

## 2020-01-14 DIAGNOSIS — K21.9 GASTROESOPHAGEAL REFLUX DISEASE WITHOUT ESOPHAGITIS: ICD-10-CM

## 2020-01-14 DIAGNOSIS — K58.8 OTHER IRRITABLE BOWEL SYNDROME: ICD-10-CM

## 2020-01-14 DIAGNOSIS — M15.9 PRIMARY OSTEOARTHRITIS INVOLVING MULTIPLE JOINTS: ICD-10-CM

## 2020-01-14 DIAGNOSIS — R73.03 PREDIABETES: ICD-10-CM

## 2020-01-14 DIAGNOSIS — J44.9 COPD MIXED TYPE (HCC): ICD-10-CM

## 2020-01-14 DIAGNOSIS — M81.0 AGE-RELATED OSTEOPOROSIS WITHOUT CURRENT PATHOLOGICAL FRACTURE: ICD-10-CM

## 2020-01-14 DIAGNOSIS — I10 ESSENTIAL HYPERTENSION: ICD-10-CM

## 2020-01-14 DIAGNOSIS — N32.89 BLADDER INSTABILITY: ICD-10-CM

## 2020-01-14 DIAGNOSIS — E04.9 GOITER: ICD-10-CM

## 2020-01-14 DIAGNOSIS — G43.009 MIGRAINE WITHOUT AURA AND WITHOUT STATUS MIGRAINOSUS, NOT INTRACTABLE: ICD-10-CM

## 2020-01-14 DIAGNOSIS — R79.89 ELEVATED TSH: ICD-10-CM

## 2020-01-14 DIAGNOSIS — N81.89 PELVIC RELAXATION: ICD-10-CM

## 2020-01-14 DIAGNOSIS — F17.200 SMOKER: ICD-10-CM

## 2020-01-14 DIAGNOSIS — Z00.00 HEALTHCARE MAINTENANCE: ICD-10-CM

## 2020-01-14 DIAGNOSIS — E78.2 MIXED HYPERLIPIDEMIA: Primary | ICD-10-CM

## 2020-01-14 PROCEDURE — 99214 OFFICE O/P EST MOD 30 MIN: CPT | Performed by: GENERAL PRACTICE

## 2020-01-14 PROCEDURE — 36415 COLL VENOUS BLD VENIPUNCTURE: CPT | Performed by: GENERAL PRACTICE

## 2020-01-14 NOTE — PROGRESS NOTES
Subjective   Albino Estrella is a 69 y.o. female.     History of Present Illness     COPD  The patient reports that her SOB, cough and wheezing have been unchanged since last here l. She states that these symptoms occur at any time during the day or night. She reports that her symptoms become worse with activity.  There is no history of any chest pain or hemoptysis and she denies any fever, chills, or night sweats.  She remains on trelegy and feels that it has helped a lot. She rinses her mouth out after each use. The patient states she also has nasal congestion and post nasal drip/clearing throat. She is following the treatment plan, including medications as directed. She continues to smoke approximately 1 packs per day. Low dose CT of the chest performed on 4/2/18 was reported as showing a 15 mm RLL pulmonary nodule, COPD, mild coronary artery vascular calcifications, and a cyst of the left upper kidney. PET scan performed on 4/27/18 revealed no hypermetabolism of the lesion noted on CT but a diffusely enlarged and hypermetabolic thyroid. U/S of the kidneys performed the same day was unremarkable. Follow up CT of the chest was performed on 9/25/18 and revealed a decrease in the RLL pulmonary nodule to 13 mm. The study was otherwise stable    Hypertension  Home blood pressure readings: not doing. Associated signs and symptoms: dyspnea. Patient denies: chest pain, palpitations, orthopnea, paroxysmal nocturnal dyspnea and peripheral edema. Current antihypertensive medications includes diltiazem and spironolactone.     Dyslipidemia  Compliance with treatment has been fair. The patient exercises intermittently. She is currently being prescribed the following medication for her dyslipidemia - atorvastatin. Patient denies side effects associated with her medications.  She has had no recent labs but is fasting today    Pelvic Pressure  She complains of persistent pelvic pressure, particularly with prolonged standing,  pushing, pulling, or lifting. This is associated with urinary frequency, urgency, dysuria, and occasional incontinence. She denies any hematuria, vaginal bleeding or discharge, change in her bowel habits, hematochezia, melena, fever or chills. She has a history of recurrent urinary tract infections.  She stopped tolterodine due to a lack of effect and increased fatigue.  Underwent a gynecology assessment with no significant abnormalities found on history or physical exam.  Offered a cystoscopy but decided to defer this for now.    The following portions of the patient's history were reviewed and updated as appropriate: allergies, current medications, past medical history, past social history and problem list.    Review of Systems   Constitutional: Positive for fatigue. Negative for appetite change, chills, fever and unexpected weight change.   HENT: Negative for congestion, ear pain, postnasal drip, sinus pressure, sneezing, sore throat and voice change.    Eyes: Negative for visual disturbance.   Respiratory: Positive for cough, shortness of breath and wheezing. Negative for stridor.    Cardiovascular: Negative for chest pain, palpitations and leg swelling.   Gastrointestinal: Negative for abdominal pain, blood in stool, constipation, diarrhea, nausea and vomiting.   Genitourinary: Positive for dysuria (intermittent), frequency, pelvic pain (pressure) and urgency. Negative for difficulty urinating, hematuria, menstrual problem, vaginal bleeding and vaginal discharge.   Musculoskeletal: Negative for arthralgias, back pain, joint swelling, myalgias and neck pain.   Neurological: Positive for dizziness (chronic-intermittent - responds to meclizine) and headaches (chronic - intermittent - stable). Negative for weakness and numbness.   Psychiatric/Behavioral: Negative for dysphoric mood and sleep disturbance. The patient is not nervous/anxious.      Objective   Physical Exam   Constitutional: She is oriented to person,  place, and time. No distress.   Bright and in good spirits. No apparent distress. No pallor, jaundice, diaphoresis, or cyanosis.   HENT:   Head: Atraumatic.   Right Ear: External ear normal.   Left Ear: External ear normal.   Nose: Nose normal.   Mouth/Throat: Oropharynx is clear and moist. Mucous membranes are not pale and not cyanotic.   Bilateral cerumen impaction   Eyes: Pupils are equal, round, and reactive to light. EOM are normal. No scleral icterus.   Neck: No JVD present. Carotid bruit is not present. No tracheal deviation present. No thyromegaly present.   Cardiovascular: Normal rate, regular rhythm, S1 normal, S2 normal and intact distal pulses. Exam reveals no gallop, no S3 and no S4.   No murmur heard.  Pulmonary/Chest: No accessory muscle usage or stridor. No respiratory distress. She has decreased breath sounds. She has wheezes (mild).   Mild pulmonary hyperinflation   Musculoskeletal: She exhibits no tenderness or deformity.     Vascular Status -  Her right foot exhibits no edema. Her left foot exhibits no edema.  Lymphadenopathy:        Head (right side): No submandibular adenopathy present.        Head (left side): No submandibular adenopathy present.     She has no cervical adenopathy.   Neurological: She is alert and oriented to person, place, and time. No cranial nerve deficit. She exhibits normal muscle tone. Coordination normal.   Skin: Skin is warm and dry. No rash noted. She is not diaphoretic. No cyanosis. No pallor. Nails show no clubbing.   Psychiatric: She has a normal mood and affect. Her speech is normal and behavior is normal. Thought content normal. She expresses no suicidal ideation.     Assessment/Plan   Problems Addressed this Visit        Cardiovascular and Mediastinum    Mixed hyperlipidemia  Encouraged to continue to work on her diet and exercise plan.  Continue current medication  Updated labs drawn.    Relevant Orders    Lipid Panel (Completed)    TSH (Completed)    Essential  hypertension  As above.   Continue current medication.    Relevant Orders    CBC & Differential (Completed)    Comprehensive Metabolic Panel (Completed)    Migraine without aura and without status migrainosus, not intractable       Respiratory    COPD mixed type (CMS/HCC)   COPD is stable.  Reminded of the importance of smoking cessation  Encouraged to remain as active as symptoms allow for  Continue current medication       Digestive    Gastroesophageal reflux disease without esophagitis    IBS (irritable bowel syndrome)       Endocrine    Goiter  Clinically euthyroid.  Will continue to monitor    Relevant Orders    TSH (Completed)       Musculoskeletal and Integument    Primary osteoarthritis  Reminded regarding symptomatic treatment.   Continue current medication    Pelvic relaxation    Age-related osteoporosis without current pathological fracture  Encouraged to continue to pursue weight bearing activities while exercising joint protection.  We will arrange an updated DEXA scan with her mammogram this spring    Relevant Orders    Vitamin D 25 Hydroxy (Completed)       Genitourinary    Bladder instability       Other    Prediabetes  Will continue to monitor    Relevant Orders    Hemoglobin A1c (Completed)    Healthcare maintenance  We will arrange an updated mammogram and low-dose CT of the chest at her return    Smoker    Elevated TSH  Will continue to monitor

## 2020-01-15 VITALS
WEIGHT: 108 LBS | DIASTOLIC BLOOD PRESSURE: 75 MMHG | TEMPERATURE: 98 F | SYSTOLIC BLOOD PRESSURE: 128 MMHG | HEIGHT: 61 IN | BODY MASS INDEX: 20.39 KG/M2 | OXYGEN SATURATION: 96 % | HEART RATE: 80 BPM | RESPIRATION RATE: 14 BRPM

## 2020-01-15 LAB
25(OH)D3+25(OH)D2 SERPL-MCNC: 36.4 NG/ML (ref 30–100)
ALBUMIN SERPL-MCNC: 4.3 G/DL (ref 3.5–5.2)
ALBUMIN/GLOB SERPL: 1.2 G/DL
ALP SERPL-CCNC: 88 U/L (ref 39–117)
ALT SERPL-CCNC: 8 U/L (ref 1–33)
AST SERPL-CCNC: 14 U/L (ref 1–32)
BASOPHILS # BLD AUTO: 0.07 10*3/MM3 (ref 0–0.2)
BASOPHILS NFR BLD AUTO: 0.7 % (ref 0–1.5)
BILIRUB SERPL-MCNC: 0.4 MG/DL (ref 0.2–1.2)
BUN SERPL-MCNC: 9 MG/DL (ref 8–23)
BUN/CREAT SERPL: 11.8 (ref 7–25)
CALCIUM SERPL-MCNC: 9.2 MG/DL (ref 8.6–10.5)
CHLORIDE SERPL-SCNC: 98 MMOL/L (ref 98–107)
CHOLEST SERPL-MCNC: 181 MG/DL (ref 0–200)
CO2 SERPL-SCNC: 25 MMOL/L (ref 22–29)
CREAT SERPL-MCNC: 0.76 MG/DL (ref 0.57–1)
EOSINOPHIL # BLD AUTO: 0.17 10*3/MM3 (ref 0–0.4)
EOSINOPHIL NFR BLD AUTO: 1.7 % (ref 0.3–6.2)
ERYTHROCYTE [DISTWIDTH] IN BLOOD BY AUTOMATED COUNT: 12.5 % (ref 12.3–15.4)
GLOBULIN SER CALC-MCNC: 3.5 GM/DL
GLUCOSE SERPL-MCNC: 89 MG/DL (ref 65–99)
HBA1C MFR BLD: 5.4 % (ref 4.8–5.6)
HCT VFR BLD AUTO: 39.4 % (ref 34–46.6)
HDLC SERPL-MCNC: 64 MG/DL (ref 40–60)
HGB BLD-MCNC: 13.7 G/DL (ref 12–15.9)
IMM GRANULOCYTES # BLD AUTO: 0.03 10*3/MM3 (ref 0–0.05)
IMM GRANULOCYTES NFR BLD AUTO: 0.3 % (ref 0–0.5)
LDLC SERPL CALC-MCNC: 99 MG/DL (ref 0–100)
LYMPHOCYTES # BLD AUTO: 4.64 10*3/MM3 (ref 0.7–3.1)
LYMPHOCYTES NFR BLD AUTO: 46.3 % (ref 19.6–45.3)
MCH RBC QN AUTO: 32.6 PG (ref 26.6–33)
MCHC RBC AUTO-ENTMCNC: 34.8 G/DL (ref 31.5–35.7)
MCV RBC AUTO: 93.8 FL (ref 79–97)
MONOCYTES # BLD AUTO: 0.43 10*3/MM3 (ref 0.1–0.9)
MONOCYTES NFR BLD AUTO: 4.3 % (ref 5–12)
NEUTROPHILS # BLD AUTO: 4.68 10*3/MM3 (ref 1.7–7)
NEUTROPHILS NFR BLD AUTO: 46.7 % (ref 42.7–76)
NRBC BLD AUTO-RTO: 0 /100 WBC (ref 0–0.2)
PLATELET # BLD AUTO: 248 10*3/MM3 (ref 140–450)
POTASSIUM SERPL-SCNC: 4.4 MMOL/L (ref 3.5–5.2)
PROT SERPL-MCNC: 7.8 G/DL (ref 6–8.5)
RBC # BLD AUTO: 4.2 10*6/MM3 (ref 3.77–5.28)
SODIUM SERPL-SCNC: 136 MMOL/L (ref 136–145)
TRIGL SERPL-MCNC: 92 MG/DL (ref 0–150)
TSH SERPL DL<=0.005 MIU/L-ACNC: 8.61 UIU/ML (ref 0.27–4.2)
VLDLC SERPL CALC-MCNC: 18.4 MG/DL
WBC # BLD AUTO: 10.02 10*3/MM3 (ref 3.4–10.8)

## 2020-03-17 RX ORDER — CIPROFLOXACIN 250 MG/1
250 TABLET, FILM COATED ORAL 2 TIMES DAILY
Qty: 20 TABLET | Refills: 0 | Status: SHIPPED | OUTPATIENT
Start: 2020-03-17 | End: 2020-03-27

## 2020-03-17 RX ORDER — CIPROFLOXACIN AND DEXAMETHASONE 3; 1 MG/ML; MG/ML
SUSPENSION/ DROPS AURICULAR (OTIC)
Qty: 7.5 ML | Refills: 0 | Status: SHIPPED | OUTPATIENT
Start: 2020-03-17 | End: 2020-03-24

## 2020-04-24 ENCOUNTER — OFFICE VISIT (OUTPATIENT)
Dept: FAMILY MEDICINE CLINIC | Facility: CLINIC | Age: 70
End: 2020-04-24

## 2020-04-24 DIAGNOSIS — Z00.00 HEALTHCARE MAINTENANCE: ICD-10-CM

## 2020-04-24 DIAGNOSIS — M80.00XA AGE-RELATED OSTEOPOROSIS WITH CURRENT PATHOLOGICAL FRACTURE, INITIAL ENCOUNTER: ICD-10-CM

## 2020-04-24 DIAGNOSIS — N39.0 RECURRENT UTI: ICD-10-CM

## 2020-04-24 DIAGNOSIS — M81.0 AGE-RELATED OSTEOPOROSIS WITHOUT CURRENT PATHOLOGICAL FRACTURE: ICD-10-CM

## 2020-04-24 DIAGNOSIS — J44.9 COPD MIXED TYPE (HCC): ICD-10-CM

## 2020-04-24 DIAGNOSIS — G43.009 MIGRAINE WITHOUT AURA AND WITHOUT STATUS MIGRAINOSUS, NOT INTRACTABLE: ICD-10-CM

## 2020-04-24 DIAGNOSIS — R73.03 PREDIABETES: ICD-10-CM

## 2020-04-24 DIAGNOSIS — F17.200 SMOKER: ICD-10-CM

## 2020-04-24 DIAGNOSIS — R79.89 ELEVATED TSH: ICD-10-CM

## 2020-04-24 DIAGNOSIS — K58.8 OTHER IRRITABLE BOWEL SYNDROME: ICD-10-CM

## 2020-04-24 DIAGNOSIS — K21.9 GASTROESOPHAGEAL REFLUX DISEASE WITHOUT ESOPHAGITIS: ICD-10-CM

## 2020-04-24 DIAGNOSIS — M15.9 PRIMARY OSTEOARTHRITIS INVOLVING MULTIPLE JOINTS: ICD-10-CM

## 2020-04-24 DIAGNOSIS — R42 VERTIGO: ICD-10-CM

## 2020-04-24 DIAGNOSIS — E78.2 MIXED HYPERLIPIDEMIA: Primary | ICD-10-CM

## 2020-04-24 DIAGNOSIS — N32.89 BLADDER INSTABILITY: ICD-10-CM

## 2020-04-24 DIAGNOSIS — E04.9 GOITER: ICD-10-CM

## 2020-04-24 DIAGNOSIS — I10 ESSENTIAL HYPERTENSION: ICD-10-CM

## 2020-04-24 DIAGNOSIS — J44.9 CHRONIC OBSTRUCTIVE PULMONARY DISEASE, UNSPECIFIED COPD TYPE (HCC): ICD-10-CM

## 2020-04-24 PROCEDURE — 99442 PR PHYS/QHP TELEPHONE EVALUATION 11-20 MIN: CPT | Performed by: GENERAL PRACTICE

## 2020-04-24 RX ORDER — SPIRONOLACTONE 25 MG/1
25 TABLET ORAL EVERY MORNING
Qty: 30 TABLET | Refills: 5 | Status: SHIPPED | OUTPATIENT
Start: 2020-04-24 | End: 2020-06-30 | Stop reason: SDUPTHER

## 2020-04-24 RX ORDER — IPRATROPIUM BROMIDE AND ALBUTEROL SULFATE 2.5; .5 MG/3ML; MG/3ML
3 SOLUTION RESPIRATORY (INHALATION) 4 TIMES DAILY PRN
Qty: 540 ML | Refills: 5 | Status: SHIPPED | OUTPATIENT
Start: 2020-04-24 | End: 2020-06-30 | Stop reason: SDUPTHER

## 2020-04-24 RX ORDER — RISEDRONATE SODIUM 150 MG/1
150 TABLET, FILM COATED ORAL
Qty: 1 TABLET | Refills: 5 | Status: SHIPPED | OUTPATIENT
Start: 2020-04-24 | End: 2020-06-30 | Stop reason: SDUPTHER

## 2020-04-24 RX ORDER — MONTELUKAST SODIUM 10 MG/1
10 TABLET ORAL NIGHTLY
Qty: 30 TABLET | Refills: 5 | Status: SHIPPED | OUTPATIENT
Start: 2020-04-24 | End: 2020-06-30 | Stop reason: SDUPTHER

## 2020-04-24 RX ORDER — DILTIAZEM HYDROCHLORIDE 360 MG/1
360 CAPSULE, EXTENDED RELEASE ORAL DAILY
Qty: 30 CAPSULE | Refills: 5 | Status: SHIPPED | OUTPATIENT
Start: 2020-04-24 | End: 2020-06-30 | Stop reason: SDUPTHER

## 2020-04-24 RX ORDER — MECLIZINE HYDROCHLORIDE 25 MG/1
25 TABLET ORAL 3 TIMES DAILY PRN
Qty: 90 TABLET | Refills: 5 | Status: SHIPPED | OUTPATIENT
Start: 2020-04-24 | End: 2020-06-30 | Stop reason: SDUPTHER

## 2020-04-24 RX ORDER — SUMATRIPTAN 100 MG/1
100 TABLET, FILM COATED ORAL ONCE AS NEEDED
Qty: 9 TABLET | Refills: 5 | Status: SHIPPED | OUTPATIENT
Start: 2020-04-24 | End: 2020-06-30 | Stop reason: SDUPTHER

## 2020-04-24 RX ORDER — ATORVASTATIN CALCIUM 40 MG/1
40 TABLET, FILM COATED ORAL NIGHTLY
Qty: 30 TABLET | Refills: 5 | Status: SHIPPED | OUTPATIENT
Start: 2020-04-24 | End: 2020-06-30 | Stop reason: SDUPTHER

## 2020-04-24 RX ORDER — PANTOPRAZOLE SODIUM 40 MG/1
40 TABLET, DELAYED RELEASE ORAL 2 TIMES DAILY
Qty: 60 TABLET | Refills: 5 | Status: SHIPPED | OUTPATIENT
Start: 2020-04-24 | End: 2020-06-30 | Stop reason: SDUPTHER

## 2020-04-24 RX ORDER — ESTRADIOL 0.1 MG/G
2 CREAM VAGINAL 2 TIMES WEEKLY
Qty: 20 G | Refills: 5 | Status: SHIPPED | OUTPATIENT
Start: 2020-04-27 | End: 2020-06-30 | Stop reason: SDUPTHER

## 2020-04-24 RX ORDER — LANOLIN ALCOHOL/MO/W.PET/CERES
1000 CREAM (GRAM) TOPICAL DAILY
Qty: 30 TABLET | Refills: 5 | Status: SHIPPED | OUTPATIENT
Start: 2020-04-24 | End: 2020-06-30 | Stop reason: SDUPTHER

## 2020-04-24 NOTE — PROGRESS NOTES
Subjective   Albino Estrella is a 69 y.o. female.     History of Present Illness     This visit has been rescheduled as a phone visit to comply with patient safety concerns in accordance with CDC recommendations. Total time of discussion was 11 minutes.    You have chosen to receive care through a telephone visit. Do you consent to use a telephone visit for your medical care today? Yes    COPD  The patient reports that her SOB, cough and wheezing have been unchanged since last here. She states that these symptoms occur at any time during the day or night. She reports that her symptoms become worse with activity.  There is no history of any chest pain or hemoptysis and she denies any fever, chills, or night sweats.  She remains on trelegy and feels that it has helped a lot. She rinses her mouth out after each use.  She is currently using her nebulized albuterol 2-3 times daily. The patient states she also has nasal congestion and post nasal drip/clearing throat.  She denies any other upper respiratory tract symptoms.  She is following the treatment plan, including medications as directed. She continues to smoke approximately 1 packs per day. Low dose CT of the chest performed on 4/2/18 was reported as showing a 15 mm RLL pulmonary nodule, COPD, mild coronary artery vascular calcifications, and a cyst of the left upper kidney. PET scan performed on 4/27/18 revealed no hypermetabolism of the lesion noted on CT but a diffusely enlarged and hypermetabolic thyroid. U/S of the kidneys performed the same day was unremarkable. Follow up CT of the chest was performed on 9/25/18 and revealed a decrease in the RLL pulmonary nodule to 13 mm. The study was otherwise stable    Hypertension  Home blood pressure readings: not doing. Associated signs and symptoms: dyspnea. Patient denies: chest pain, palpitations, orthopnea, paroxysmal nocturnal dyspnea and peripheral edema. Current antihypertensive medications includes diltiazem and  spironolactone.   Lab Results   Component Value Date    GLUCOSE 101 01/12/2017    BUN 9 01/14/2020    CREATININE 0.76 01/14/2020    EGFRIFNONA 75 01/14/2020    EGFRIFAFRI 91 01/14/2020    BCR 11.8 01/14/2020    K 4.4 01/14/2020    CO2 25.0 01/14/2020    CALCIUM 9.2 01/14/2020    PROTENTOTREF 7.8 01/14/2020    ALBUMIN 4.30 01/14/2020    LABIL2 1.2 01/14/2020    AST 14 01/14/2020    ALT 8 01/14/2020     Dyslipidemia  Compliance with treatment has been fair. The patient exercises intermittently. She is currently being prescribed the following medication for her dyslipidemia - atorvastatin. Patient denies side effects associated with her medications.    Lab Results   Component Value Date    CHOL 197 01/12/2017    CHLPL 181 01/14/2020    TRIG 92 01/14/2020    HDL 64 (H) 01/14/2020    LDL 99 01/14/2020     Pelvic Pressure  She complains of persistent pelvic pressure, particularly with prolonged standing, pushing, pulling, or lifting. This is associated with urinary frequency, urgency, dysuria, and occasional incontinence. She denies any hematuria, vaginal bleeding or discharge, change in her bowel habits, hematochezia, melena, fever or chills. She has a history of recurrent urinary tract infections.  She stopped tolterodine due to a lack of effect and increased fatigue.  Underwent a gynecology assessment with no significant abnormalities found on history or physical exam.  Offered a cystoscopy but decided to defer this for now.    Labs  Lab Results   Component Value Date    HGBA1C 5.40 01/14/2020     Lab Results   Component Value Date    TSH 8.610 (H) 01/14/2020     The following portions of the patient's history were reviewed and updated as appropriate: allergies, current medications, past medical history, past social history and problem list.    Review of Systems   Constitutional: Positive for fatigue. Negative for appetite change, chills, fever and unexpected weight change.   HENT: Negative for congestion, ear pain,  postnasal drip, sinus pressure, sneezing, sore throat and voice change.    Eyes: Negative for visual disturbance.   Respiratory: Positive for cough, shortness of breath and wheezing. Negative for stridor.    Cardiovascular: Negative for chest pain, palpitations and leg swelling.   Gastrointestinal: Negative for abdominal pain, blood in stool, constipation, diarrhea, nausea and vomiting.   Genitourinary: Positive for dysuria (intermittent), frequency, pelvic pain (pressure) and urgency. Negative for difficulty urinating, hematuria and vaginal discharge.   Musculoskeletal: Negative for arthralgias, back pain and myalgias.   Skin: Negative for rash.   Neurological: Positive for dizziness (chronic-intermittent - responds to meclizine) and headaches (chronic - intermittent - stable). Negative for weakness and numbness.   Psychiatric/Behavioral: Negative for dysphoric mood and sleep disturbance. The patient is not nervous/anxious.      Objective   Physical Exam   Constitutional:   Bright and in good spirits.  No apparent distress or shortness of breath     Assessment/Plan   Problems Addressed this Visit        Cardiovascular and Mediastinum    Essential hypertension  Encouraged to continue to work on her diet and exercise plan.  Continue current medication    Relevant Medications    dilTIAZem (TIAZAC) 360 MG 24 hr capsule    spironolactone (ALDACTONE) 25 MG tablet    Migraine without aura and without status migrainosus, not intractable    Relevant Medications    dilTIAZem (TIAZAC) 360 MG 24 hr capsule    SUMAtriptan (Imitrex) 100 MG tablet    Mixed hyperlipidemia   As above.   Continue current medication.    Relevant Medications    atorvastatin (LIPITOR) 40 MG tablet       Respiratory    COPD mixed type (CMS/HCC)   COPD is stable.  Reminded of the importance of smoking cessation  Encouraged to remain as active as symptoms allow for  Continue current medication    Relevant Medications    Fluticasone-Umeclidin-Vilant  (Trelegy Ellipta) 100-62.5-25 MCG/INH aerosol powder     ipratropium-albuterol (DUO-NEB) 0.5-2.5 mg/3 ml nebulizer    montelukast (SINGULAIR) 10 MG tablet       Digestive    Gastroesophageal reflux disease without esophagitis    Relevant Medications    pantoprazole (PROTONIX) 40 MG EC tablet    IBS (irritable bowel syndrome)       Endocrine    Goiter    Prediabetes  As above.  Will continue to monitor       Musculoskeletal and Integument    Age-related osteoporosis without current pathological fracture    Primary osteoarthritis       Genitourinary    Bladder instability       Other    Elevated TSH  Clinically euthyroid.  Will continue to monitor    Healthcare maintenance  We will arrange an updated mammogram, DEXA scan, and low-dose CT of the chest at her return    Relevant Medications    vitamin B-12 (CYANOCOBALAMIN) 1000 MCG tablet    Smoker

## 2020-05-07 ENCOUNTER — HOSPITAL ENCOUNTER (OUTPATIENT)
Dept: MAMMOGRAPHY | Facility: HOSPITAL | Age: 70
Discharge: HOME | End: 2020-05-07
Admitting: GENERAL PRACTICE

## 2020-05-07 DIAGNOSIS — Z12.31 VISIT FOR SCREENING MAMMOGRAM: ICD-10-CM

## 2020-05-07 PROCEDURE — 77067 SCR MAMMO BI INCL CAD: CPT | Performed by: RADIOLOGY

## 2020-05-07 PROCEDURE — 77063 BREAST TOMOSYNTHESIS BI: CPT

## 2020-05-07 PROCEDURE — 77067 SCR MAMMO BI INCL CAD: CPT

## 2020-05-07 PROCEDURE — 77063 BREAST TOMOSYNTHESIS BI: CPT | Performed by: RADIOLOGY

## 2020-05-14 ENCOUNTER — HOSPITAL ENCOUNTER (OUTPATIENT)
Dept: MAMMOGRAPHY | Facility: HOSPITAL | Age: 70
Discharge: HOME | End: 2020-05-14
Admitting: RADIOLOGY

## 2020-05-14 DIAGNOSIS — R92.8 ABNORMAL MAMMOGRAM: ICD-10-CM

## 2020-05-14 PROCEDURE — G0279 TOMOSYNTHESIS, MAMMO: HCPCS | Performed by: RADIOLOGY

## 2020-05-14 PROCEDURE — 77065 DX MAMMO INCL CAD UNI: CPT

## 2020-05-14 PROCEDURE — G0279 TOMOSYNTHESIS, MAMMO: HCPCS

## 2020-05-14 PROCEDURE — 77065 DX MAMMO INCL CAD UNI: CPT | Performed by: RADIOLOGY

## 2020-06-30 DIAGNOSIS — K21.9 GASTROESOPHAGEAL REFLUX DISEASE WITHOUT ESOPHAGITIS: ICD-10-CM

## 2020-06-30 DIAGNOSIS — G43.009 MIGRAINE WITHOUT AURA AND WITHOUT STATUS MIGRAINOSUS, NOT INTRACTABLE: ICD-10-CM

## 2020-06-30 DIAGNOSIS — M80.00XA AGE-RELATED OSTEOPOROSIS WITH CURRENT PATHOLOGICAL FRACTURE, INITIAL ENCOUNTER: ICD-10-CM

## 2020-06-30 DIAGNOSIS — N39.0 RECURRENT UTI: ICD-10-CM

## 2020-06-30 DIAGNOSIS — J44.9 COPD MIXED TYPE (HCC): ICD-10-CM

## 2020-06-30 DIAGNOSIS — R42 VERTIGO: ICD-10-CM

## 2020-06-30 DIAGNOSIS — J44.9 CHRONIC OBSTRUCTIVE PULMONARY DISEASE, UNSPECIFIED COPD TYPE (HCC): ICD-10-CM

## 2020-06-30 DIAGNOSIS — I10 ESSENTIAL HYPERTENSION: ICD-10-CM

## 2020-06-30 DIAGNOSIS — E78.2 MIXED HYPERLIPIDEMIA: ICD-10-CM

## 2020-06-30 DIAGNOSIS — Z00.00 HEALTHCARE MAINTENANCE: ICD-10-CM

## 2020-06-30 RX ORDER — MECLIZINE HYDROCHLORIDE 25 MG/1
25 TABLET ORAL 3 TIMES DAILY PRN
Qty: 90 TABLET | Refills: 5 | Status: SHIPPED | OUTPATIENT
Start: 2020-06-30 | End: 2021-12-17 | Stop reason: SDUPTHER

## 2020-06-30 RX ORDER — MONTELUKAST SODIUM 10 MG/1
10 TABLET ORAL NIGHTLY
Qty: 30 TABLET | Refills: 5 | Status: SHIPPED | OUTPATIENT
Start: 2020-06-30 | End: 2021-09-16 | Stop reason: SDUPTHER

## 2020-06-30 RX ORDER — RISEDRONATE SODIUM 150 MG/1
150 TABLET, FILM COATED ORAL
Qty: 1 TABLET | Refills: 5 | Status: SHIPPED | OUTPATIENT
Start: 2020-06-30 | End: 2021-02-09

## 2020-06-30 RX ORDER — SPIRONOLACTONE 25 MG/1
25 TABLET ORAL EVERY MORNING
Qty: 30 TABLET | Refills: 5 | Status: SHIPPED | OUTPATIENT
Start: 2020-06-30 | End: 2021-09-16 | Stop reason: SDUPTHER

## 2020-06-30 RX ORDER — IPRATROPIUM BROMIDE AND ALBUTEROL SULFATE 2.5; .5 MG/3ML; MG/3ML
3 SOLUTION RESPIRATORY (INHALATION) 4 TIMES DAILY PRN
Qty: 540 ML | Refills: 5 | Status: SHIPPED | OUTPATIENT
Start: 2020-06-30 | End: 2020-06-30 | Stop reason: SDUPTHER

## 2020-06-30 RX ORDER — ESTRADIOL 0.1 MG/G
2 CREAM VAGINAL 2 TIMES WEEKLY
Qty: 20 G | Refills: 5 | Status: SHIPPED | OUTPATIENT
Start: 2020-07-02 | End: 2021-09-16 | Stop reason: SDUPTHER

## 2020-06-30 RX ORDER — PANTOPRAZOLE SODIUM 40 MG/1
40 TABLET, DELAYED RELEASE ORAL 2 TIMES DAILY
Qty: 60 TABLET | Refills: 5 | Status: SHIPPED | OUTPATIENT
Start: 2020-06-30 | End: 2021-05-17 | Stop reason: SDUPTHER

## 2020-06-30 RX ORDER — SUMATRIPTAN 100 MG/1
100 TABLET, FILM COATED ORAL ONCE AS NEEDED
Qty: 9 TABLET | Refills: 5 | Status: SHIPPED | OUTPATIENT
Start: 2020-06-30 | End: 2021-06-30

## 2020-06-30 RX ORDER — ATORVASTATIN CALCIUM 40 MG/1
40 TABLET, FILM COATED ORAL NIGHTLY
Qty: 30 TABLET | Refills: 5 | Status: SHIPPED | OUTPATIENT
Start: 2020-06-30 | End: 2021-01-12

## 2020-06-30 RX ORDER — LANOLIN ALCOHOL/MO/W.PET/CERES
1000 CREAM (GRAM) TOPICAL DAILY
Qty: 30 TABLET | Refills: 5 | Status: SHIPPED | OUTPATIENT
Start: 2020-06-30 | End: 2021-09-16 | Stop reason: SDUPTHER

## 2020-06-30 RX ORDER — DILTIAZEM HYDROCHLORIDE 360 MG/1
360 CAPSULE, EXTENDED RELEASE ORAL DAILY
Qty: 30 CAPSULE | Refills: 5 | Status: SHIPPED | OUTPATIENT
Start: 2020-06-30 | End: 2021-01-14 | Stop reason: SDUPTHER

## 2020-06-30 RX ORDER — IPRATROPIUM BROMIDE AND ALBUTEROL SULFATE 2.5; .5 MG/3ML; MG/3ML
3 SOLUTION RESPIRATORY (INHALATION) 4 TIMES DAILY PRN
Qty: 540 ML | Refills: 5 | Status: SHIPPED | OUTPATIENT
Start: 2020-06-30 | End: 2021-06-24 | Stop reason: SDUPTHER

## 2020-06-30 RX ORDER — DILTIAZEM HYDROCHLORIDE 360 MG/1
360 CAPSULE, EXTENDED RELEASE ORAL DAILY
Qty: 30 CAPSULE | Refills: 5 | Status: SHIPPED | OUTPATIENT
Start: 2020-06-30 | End: 2020-06-30 | Stop reason: SDUPTHER

## 2020-06-30 RX ORDER — PANTOPRAZOLE SODIUM 40 MG/1
40 TABLET, DELAYED RELEASE ORAL 2 TIMES DAILY
Qty: 60 TABLET | Refills: 5 | Status: SHIPPED | OUTPATIENT
Start: 2020-06-30 | End: 2020-06-30 | Stop reason: SDUPTHER

## 2020-06-30 NOTE — TELEPHONE ENCOUNTER
----- Message from Ema Veloz sent at 6/30/2020 12:31 PM EDT -----  [facility] patient     All breathing solution supplies, trelegy, diltiazem, Lipitor, Protonix send to [facility]

## 2020-07-27 ENCOUNTER — OFFICE VISIT (OUTPATIENT)
Dept: FAMILY MEDICINE CLINIC | Facility: CLINIC | Age: 70
End: 2020-07-27

## 2020-07-27 VITALS
BODY MASS INDEX: 19.83 KG/M2 | SYSTOLIC BLOOD PRESSURE: 158 MMHG | WEIGHT: 105 LBS | OXYGEN SATURATION: 97 % | HEIGHT: 61 IN | RESPIRATION RATE: 14 BRPM | TEMPERATURE: 97.1 F | DIASTOLIC BLOOD PRESSURE: 74 MMHG | HEART RATE: 77 BPM

## 2020-07-27 DIAGNOSIS — R73.03 PREDIABETES: ICD-10-CM

## 2020-07-27 DIAGNOSIS — R79.89 ELEVATED TSH: ICD-10-CM

## 2020-07-27 DIAGNOSIS — R92.8 ABNORMAL MAMMOGRAM OF RIGHT BREAST: ICD-10-CM

## 2020-07-27 DIAGNOSIS — N81.89 PELVIC RELAXATION: ICD-10-CM

## 2020-07-27 DIAGNOSIS — M81.0 AGE-RELATED OSTEOPOROSIS WITHOUT CURRENT PATHOLOGICAL FRACTURE: ICD-10-CM

## 2020-07-27 DIAGNOSIS — I10 ESSENTIAL HYPERTENSION: ICD-10-CM

## 2020-07-27 DIAGNOSIS — F17.200 SMOKER: ICD-10-CM

## 2020-07-27 DIAGNOSIS — M15.9 PRIMARY OSTEOARTHRITIS INVOLVING MULTIPLE JOINTS: ICD-10-CM

## 2020-07-27 DIAGNOSIS — J44.9 COPD MIXED TYPE (HCC): ICD-10-CM

## 2020-07-27 DIAGNOSIS — Z87.440 H/O RECURRENT URINARY TRACT INFECTION: ICD-10-CM

## 2020-07-27 DIAGNOSIS — B37.31 CANDIDA VAGINITIS: ICD-10-CM

## 2020-07-27 DIAGNOSIS — E04.9 GOITER: ICD-10-CM

## 2020-07-27 DIAGNOSIS — K58.8 OTHER IRRITABLE BOWEL SYNDROME: ICD-10-CM

## 2020-07-27 DIAGNOSIS — E78.2 MIXED HYPERLIPIDEMIA: Primary | ICD-10-CM

## 2020-07-27 DIAGNOSIS — Z00.00 HEALTHCARE MAINTENANCE: ICD-10-CM

## 2020-07-27 DIAGNOSIS — N32.89 BLADDER INSTABILITY: ICD-10-CM

## 2020-07-27 DIAGNOSIS — B37.31 CANDIDA VAGINITIS: Primary | ICD-10-CM

## 2020-07-27 DIAGNOSIS — G43.009 MIGRAINE WITHOUT AURA AND WITHOUT STATUS MIGRAINOSUS, NOT INTRACTABLE: ICD-10-CM

## 2020-07-27 DIAGNOSIS — K21.9 GASTROESOPHAGEAL REFLUX DISEASE WITHOUT ESOPHAGITIS: ICD-10-CM

## 2020-07-27 LAB
BILIRUB BLD-MCNC: NEGATIVE MG/DL
CLARITY, POC: CLEAR
COLOR UR: YELLOW
GLUCOSE UR STRIP-MCNC: NEGATIVE MG/DL
KETONES UR QL: NEGATIVE
LEUKOCYTE EST, POC: NEGATIVE
NITRITE UR-MCNC: NEGATIVE MG/ML
PH UR: 6.5 [PH] (ref 5–8)
PROT UR STRIP-MCNC: NEGATIVE MG/DL
RBC # UR STRIP: NEGATIVE /UL
SP GR UR: 1.01 (ref 1–1.03)
UROBILINOGEN UR QL: NORMAL

## 2020-07-27 PROCEDURE — 99214 OFFICE O/P EST MOD 30 MIN: CPT | Performed by: GENERAL PRACTICE

## 2020-07-27 PROCEDURE — 81003 URINALYSIS AUTO W/O SCOPE: CPT | Performed by: GENERAL PRACTICE

## 2020-07-27 RX ORDER — PROMETHAZINE HYDROCHLORIDE 25 MG/1
25 TABLET ORAL EVERY 6 HOURS PRN
Qty: 30 TABLET | Refills: 0 | Status: SHIPPED | OUTPATIENT
Start: 2020-07-27 | End: 2021-05-14 | Stop reason: SDUPTHER

## 2020-07-27 RX ORDER — FLUCONAZOLE 150 MG/1
150 TABLET ORAL DAILY PRN
Qty: 3 TABLET | Refills: 0 | Status: SHIPPED | OUTPATIENT
Start: 2020-07-27 | End: 2021-02-09

## 2020-07-27 NOTE — PROGRESS NOTES
Subjective   Albino Estrella is a 69 y.o. female.     History of Present Illness     COPD  The patient has experienced increased SOB, cough and wheezing through summer.  This has been the case in the past. There is no history of any chest pain or hemoptysis and she denies any fever, chills, or night sweats.  She remains on trelegy and feels that it has helped a lot. She is currently using her nebulized albuterol 4 times daily. The patient states she also has nasal congestion and post nasal drip/clearing throat.  She denies any other upper respiratory tract symptoms. She continues to smoke approximately 1 packs per day. Low dose CT of the chest performed on 4/2/18 was reported as showing a 15 mm RLL pulmonary nodule, COPD, mild coronary artery vascular calcifications, and a cyst of the left upper kidney. PET scan performed on 4/27/18 revealed no hypermetabolism of the lesion noted on CT but a diffusely enlarged and hypermetabolic thyroid. U/S of the kidneys performed the same day was unremarkable. Follow up CT of the chest was performed on 9/25/18 and revealed a decrease in the RLL pulmonary nodule to 13 mm.     Hypertension  Home blood pressure readings: not doing. Associated signs and symptoms: dyspnea. Patient denies: chest pain, palpitations, orthopnea, paroxysmal nocturnal dyspnea and peripheral edema. Current antihypertensive medications includes diltiazem and spironolactone.     Dyslipidemia  Compliance with treatment has been fair. The patient exercises intermittently. She is currently being prescribed the following medication for her dyslipidemia - atorvastatin. Patient denies side effects associated with her medications.  She has had no recent labs    Pelvic Pressure  She complains of persistent pelvic pressure, particularly with prolonged standing, pushing, pulling, or lifting. This is associated with urinary frequency, urgency, dysuria, and occasional incontinence. She denies any hematuria, vaginal bleeding  or discharge, change in her bowel habits, hematochezia, melena, fever or chills. She has a history of recurrent urinary tract infections.  She was tried on tolterodine but stopped it due to a lack of effect and increased fatigue.  Underwent a gynecology assessment with no significant abnormalities found on history or physical exam.  Offered a cystoscopy but decided to defer this for now.    Imaging  Mammogram performed on 5/7/2020 was reported as showing an asymmetry about the inferior aspect of the right breast.  Compression views performed on 5/14/2020 revealed some improvement and a six-month follow-up diagnostic right mammogram was recommended.    The following portions of the patient's history were reviewed and updated as appropriate: allergies, current medications, past medical history, past social history and problem list.    Review of Systems   Constitutional: Positive for fatigue. Negative for appetite change, chills, fever and unexpected weight change.   HENT: Negative for congestion, ear pain, postnasal drip, sinus pressure, sneezing, sore throat and voice change.    Eyes: Negative for visual disturbance.   Respiratory: Positive for cough, shortness of breath and wheezing. Negative for stridor.    Cardiovascular: Negative for chest pain, palpitations and leg swelling.   Gastrointestinal: Negative for abdominal pain, blood in stool, constipation, diarrhea, nausea and vomiting.   Genitourinary: Positive for dysuria (intermittent), frequency, pelvic pain (pressure) and urgency. Negative for difficulty urinating, hematuria and vaginal discharge.   Musculoskeletal: Negative for arthralgias, back pain and myalgias.   Skin: Negative for rash.   Neurological: Positive for dizziness (chronic-intermittent - responds to meclizine) and headaches (chronic - intermittent - stable). Negative for weakness and numbness.   Psychiatric/Behavioral: Negative for dysphoric mood and sleep disturbance. The patient is not  nervous/anxious.      Objective   Physical Exam   Constitutional: She is oriented to person, place, and time. No distress.   Accompanied by her . Bright and in good spirits. No apparent distress. No pallor, jaundice, diaphoresis, or cyanosis.   HENT:   Head: Atraumatic.   Eyes: Pupils are equal, round, and reactive to light. EOM are normal. No scleral icterus.   Neck: No JVD present. Carotid bruit is not present. No tracheal deviation present. Thyromegaly present.   Cardiovascular: Normal rate, regular rhythm, S1 normal, S2 normal and intact distal pulses. Exam reveals no gallop, no S3 and no S4.   No murmur heard.  Pulmonary/Chest: No accessory muscle usage or stridor. No respiratory distress. She has decreased breath sounds. She has wheezes (mild).   Mild pulmonary hyperinflation   Musculoskeletal: She exhibits no tenderness or deformity.     Vascular Status -  Her right foot exhibits no edema. Her left foot exhibits no edema.  Lymphadenopathy:        Head (right side): No submandibular adenopathy present.        Head (left side): No submandibular adenopathy present.     She has no cervical adenopathy.   Neurological: She is alert and oriented to person, place, and time. No cranial nerve deficit. She exhibits normal muscle tone. Coordination normal.   Skin: Skin is warm and dry. No rash noted. She is not diaphoretic. No cyanosis. No pallor. Nails show no clubbing.   Psychiatric: She has a normal mood and affect. Her speech is normal and behavior is normal. Thought content normal. She expresses no suicidal ideation.     Assessment/Plan   Problems Addressed this Visit        Cardiovascular and Mediastinum    Essential hypertension   Hypertension: elevated today. Evidence of target organ damage: none.  Encouraged to continue to work on diet and exercise plan.   Continue current medication for now  If elevated at her return will modify her antihypertensive regimen    Migraine without aura and without status  migrainosus, not intractable    Mixed hyperlipidemia   As above.   Continue current medication.       Respiratory    COPD mixed type (CMS/HCC)   COPD is worsening.  Reminded of the importance of smoking cessation  Encouraged to remain as active as symptoms allow for  Continue current medication    Relevant Medications    promethazine (PHENERGAN) 25 MG tablet       Digestive    Gastroesophageal reflux disease without esophagitis    Relevant Medications    promethazine (PHENERGAN) 25 MG tablet    IBS (irritable bowel syndrome)       Endocrine    Goiter  Clinically euthyroid.  Will continue to monitor    Prediabetes  As above.  Will continue to monitor       Musculoskeletal and Integument    Age-related osteoporosis without current pathological fracture  Recommended an updated DEXA scan.  Patient would like to defer to the time of her right diagnostic mammogram in November    Pelvic relaxation    Primary osteoarthritis       Genitourinary    Bladder instability    Candida vaginitis  Fluconazole as needed       Other    Abnormal mammogram of right breast  Patient is aware that she will be due for an updated right diagnostic mammogram in November    Elevated TSH  As above.  Will continue to monitor    H/O recurrent urinary tract infection    Relevant Orders    POC Urinalysis Dipstick, Automated (Completed)    Healthcare maintenance  Encouraged to follow up with shingrix.  Reminded to get a flu shot when available  Recommended an updated low-dose CT of the chest.  Patient would like to defer to the upper time of her right diagnostic mammogram in November    Smoker

## 2020-08-03 ENCOUNTER — PATIENT OUTREACH (OUTPATIENT)
Dept: PHARMACY | Facility: HOSPITAL | Age: 70
End: 2020-08-03

## 2020-08-03 NOTE — OUTREACH NOTE
"Medication Adherence Call      Patient was called today to discuss medication adherence with atorvastatin as the patient was identified as having care opportunities for preventive services for selected quality indicators, based on the nationally recognized [employer] performance measurement set.       The patient states that she does not have trouble remembering to take her medication. She does state that she doesn't take them for a few days whenever they \"irritate and burn\" her kidneys. She claims that when this happens she has to skip a few doses. Patient states that her doctor is aware of this issue. The patient denies any barriers to receiving medications.    Spoke with Ms. Estrella about ways to improve medication adherence. Recommended using a pill organizer and switching to a 90-day supply. Patient says that she will ask her pharmacy about receiving a 90-day supply the next time she picks up her medication.    Thank you,   Rachel Charles [facility]  08/03/20  16:21    "

## 2020-10-27 ENCOUNTER — OFFICE VISIT (OUTPATIENT)
Dept: FAMILY MEDICINE CLINIC | Facility: CLINIC | Age: 70
End: 2020-10-27

## 2020-10-27 DIAGNOSIS — Z00.00 HEALTHCARE MAINTENANCE: ICD-10-CM

## 2020-10-27 DIAGNOSIS — M15.9 PRIMARY OSTEOARTHRITIS INVOLVING MULTIPLE JOINTS: ICD-10-CM

## 2020-10-27 DIAGNOSIS — E04.9 GOITER: ICD-10-CM

## 2020-10-27 DIAGNOSIS — J44.9 COPD MIXED TYPE (HCC): ICD-10-CM

## 2020-10-27 DIAGNOSIS — K21.9 GASTROESOPHAGEAL REFLUX DISEASE WITHOUT ESOPHAGITIS: ICD-10-CM

## 2020-10-27 DIAGNOSIS — R92.8 ABNORMAL MAMMOGRAM OF RIGHT BREAST: ICD-10-CM

## 2020-10-27 DIAGNOSIS — M81.0 AGE-RELATED OSTEOPOROSIS WITHOUT CURRENT PATHOLOGICAL FRACTURE: ICD-10-CM

## 2020-10-27 DIAGNOSIS — E78.2 MIXED HYPERLIPIDEMIA: Primary | ICD-10-CM

## 2020-10-27 DIAGNOSIS — G43.009 MIGRAINE WITHOUT AURA AND WITHOUT STATUS MIGRAINOSUS, NOT INTRACTABLE: ICD-10-CM

## 2020-10-27 DIAGNOSIS — I10 ESSENTIAL HYPERTENSION: ICD-10-CM

## 2020-10-27 DIAGNOSIS — K58.8 OTHER IRRITABLE BOWEL SYNDROME: ICD-10-CM

## 2020-10-27 DIAGNOSIS — R79.89 ELEVATED TSH: ICD-10-CM

## 2020-10-27 DIAGNOSIS — N81.89 PELVIC RELAXATION: ICD-10-CM

## 2020-10-27 DIAGNOSIS — N32.89 BLADDER INSTABILITY: ICD-10-CM

## 2020-10-27 DIAGNOSIS — Z87.440 H/O RECURRENT URINARY TRACT INFECTION: ICD-10-CM

## 2020-10-27 DIAGNOSIS — F17.200 SMOKER: ICD-10-CM

## 2020-10-27 DIAGNOSIS — R73.03 PREDIABETES: ICD-10-CM

## 2020-10-27 DIAGNOSIS — Z23 ENCOUNTER FOR IMMUNIZATION: ICD-10-CM

## 2020-10-27 PROBLEM — B37.31 CANDIDA VAGINITIS: Status: RESOLVED | Noted: 2017-08-17 | Resolved: 2020-10-27

## 2020-10-27 PROBLEM — N28.1 CYST OF LEFT KIDNEY: Status: RESOLVED | Noted: 2018-04-03 | Resolved: 2020-10-27

## 2020-10-27 PROCEDURE — G0439 PPPS, SUBSEQ VISIT: HCPCS | Performed by: GENERAL PRACTICE

## 2020-10-27 PROCEDURE — 96160 PT-FOCUSED HLTH RISK ASSMT: CPT | Performed by: GENERAL PRACTICE

## 2020-10-27 PROCEDURE — 90694 VACC AIIV4 NO PRSRV 0.5ML IM: CPT | Performed by: GENERAL PRACTICE

## 2020-10-27 PROCEDURE — G0008 ADMIN INFLUENZA VIRUS VAC: HCPCS | Performed by: GENERAL PRACTICE

## 2020-10-27 NOTE — PROGRESS NOTES
The ABCs of the Annual Wellness Visit  Subsequent Medicare Wellness Visit    Subjective   History of Present Illness:  Albino Estrella is a 69 y.o. female who presents for a Subsequent Medicare Wellness Visit.    COPD  The patient has a history of intermittent SOB, cough and wheezing. There is no history of any chest pain or hemoptysis. She remains on trelegy and feels that it has helped a lot. She continues to use nebulized albuterol 4 times daily. The patient admits to intermittent nasal congestion and post nasal drip/clearing throat.  There is no history of any other upper respiratory tract symptoms and she continues to deny any fever, chills, or night sweats.  She is currently smoking 1 pack per day. Low dose CT of the chest performed on 4/2/18 was reported as showing a 15 mm RLL pulmonary nodule, COPD, mild coronary artery vascular calcifications, and a cyst of the left upper kidney. PET scan performed on 4/27/18 revealed no hypermetabolism of the lesion noted on CT but a diffusely enlarged and hypermetabolic thyroid. U/S of the kidneys performed the same day was unremarkable. Follow up CT of the chest was performed on 9/25/18 and revealed a decrease in the RLL pulmonary nodule to 13 mm.     Hypertension  Home blood pressure readings: not doing. Associated signs and symptoms: dyspnea. Patient denies: chest pain, palpitations, orthopnea, paroxysmal nocturnal dyspnea or peripheral edema. Current antihypertensive medications includes diltiazem and spironolactone.     Dyslipidemia  Compliance with treatment has been fair. The patient exercises intermittently. She is currently being prescribed the following medication for her dyslipidemia - atorvastatin. Patient denies side effects associated with her medications.  She has had no recent labs    Pelvic Pressure  She continues to experience pelvic pressure with prolonged standing, pushing, pulling, or lifting. This is associated with urinary frequency, urgency, dysuria,  and occasional incontinence. She continues to deny any hematuria, vaginal bleeding or discharge, change in her bowel habits, hematochezia, melena, fever or chills. She has a history of recurrent urinary tract infections.  She was tried on tolterodine but stopped it due to a lack of effect and increased fatigue.  Underwent a gynecology assessment with no significant abnormalities found on history or physical exam.  Offered a cystoscopy but decided to defer this for now.    Imaging  Mammogram performed on 5/7/2020 was reported as showing an asymmetry about the inferior aspect of the right breast.  Compression views performed on 5/14/2020 revealed some improvement and a six-month follow-up diagnostic right mammogram was recommended.    HEALTH RISK ASSESSMENT    Recent Hospitalizations:  No hospitalization(s) within the last year.    Current Medical Providers:  Patient Care Team:  Stan Alvarado MD as PCP - General  JennyStan de anda MD as PCP - Family Medicine    Smoking Status:  Social History     Tobacco Use   Smoking Status Current Every Day Smoker   • Packs/day: 1.00   • Types: Cigarettes   Smokeless Tobacco Never Used     Alcohol Consumption:  Social History     Substance and Sexual Activity   Alcohol Use No     Depression Screen:   PHQ-2/PHQ-9 Depression Screening 10/27/2020   Little interest or pleasure in doing things 0   Feeling down, depressed, or hopeless 0   Total Score 0     Fall Risk Screen:  STEADI Fall Risk Assessment was completed, and patient is at LOW risk for falls.Assessment completed on:10/27/2020    Health Habits and Functional and Cognitive Screening:  Functional & Cognitive Status 10/27/2020   Do you have difficulty preparing food and eating? No   Do you have difficulty bathing yourself, getting dressed or grooming yourself? No   Do you have difficulty using the toilet? No   Do you have difficulty moving around from place to place? No   Do you have trouble with steps or getting  out of a bed or a chair? No   Current Diet Well Balanced Diet   Dental Exam Up to date   Exercise (times per week) 5 times per week   Current Exercise Activities Include Housecleaning   Do you need help using the phone?  No   Are you deaf or do you have serious difficulty hearing?  No   Do you need help with transportation? No   Do you need help shopping? No   Do you need help preparing meals?  No   Do you need help with housework?  No   Do you need help with laundry? No   Do you need help taking your medications? No   Do you need help managing money? No   Do you ever drive or ride in a car without wearing a seat belt? No   Have you felt unusual stress, anger or loneliness in the last month? No   Who do you live with? Spouse   If you need help, do you have trouble finding someone available to you? No   Have you been bothered in the last four weeks by sexual problems? No   Do you have difficulty concentrating, remembering or making decisions? No     Does the patient have evidence of cognitive impairment? No    Asprin use counseling:Start ASA 81 mg daily     Age-appropriate Screening Schedule:  Refer to the list below for future screening recommendations based on patient's age, sex and/or medical conditions. Orders for these recommended tests are listed in the plan section. The patient has been provided with a written plan.    Health Maintenance   Topic Date Due   • ZOSTER VACCINE (1 of 2) 12/01/2000   • DXA SCAN  03/13/2019   • LIPID PANEL  01/14/2021   • MAMMOGRAM  05/14/2022   • TDAP/TD VACCINES (2 - Td) 09/22/2024   • COLONOSCOPY  10/26/2028   • INFLUENZA VACCINE  Completed          The following portions of the patient's history were reviewed and updated as appropriate: allergies, current medications, past family history, past medical history, past social history, past surgical history and problem list.    Outpatient Medications Prior to Visit   Medication Sig Dispense Refill   • atorvastatin (LIPITOR) 40 MG  tablet Take 1 tablet by mouth Every Night. 30 tablet 5   • dilTIAZem (TIAZAC) 360 MG 24 hr capsule Take 1 capsule by mouth Daily. 30 capsule 5   • estradiol (ESTRACE) 0.1 MG/GM vaginal cream Insert 2 g into the vagina 2 (Two) Times a Week. 20 g 5   • fluconazole (DIFLUCAN) 150 MG tablet Take 1 tablet by mouth Daily As Needed (yeast infection). 3 tablet 0   • Fluticasone-Umeclidin-Vilant (Trelegy Ellipta) 100-62.5-25 MCG/INH aerosol powder  Inhale 1 puff Daily. 28 each 5   • ipratropium-albuterol (DUO-NEB) 0.5-2.5 mg/3 ml nebulizer Take 3 mL by nebulization 4 (Four) Times a Day As Needed for Wheezing or Shortness of Air. 540 mL 5   • meclizine (Antivert) 25 MG tablet Take 1 tablet by mouth 3 (Three) Times a Day As Needed for Dizziness. 90 tablet 5   • montelukast (SINGULAIR) 10 MG tablet Take 1 tablet by mouth Every Night. 30 tablet 5   • pantoprazole (PROTONIX) 40 MG EC tablet Take 1 tablet by mouth 2 (Two) Times a Day. 60 tablet 5   • promethazine (PHENERGAN) 25 MG tablet Take 1 tablet by mouth Every 6 (Six) Hours As Needed for Nausea or Vomiting. 30 tablet 0   • risedronate (Actonel) 150 MG tablet Take 1 tablet by mouth Every 30 (Thirty) Days. with water on empty stomach, nothing by mouth or lie down for next 30 minutes. 1 tablet 5   • spironolactone (ALDACTONE) 25 MG tablet Take 1 tablet by mouth Every Morning. 30 tablet 5   • SUMAtriptan (Imitrex) 100 MG tablet Take 1 tablet by mouth 1 (One) Time As Needed for Migraine for up to 1 dose. 9 tablet 5   • vitamin B-12 (CYANOCOBALAMIN) 1000 MCG tablet Take 1 tablet by mouth Daily. 30 tablet 5   • Zoster Vac Recomb Adjuvanted (Shingrix) 50 MCG/0.5ML reconstituted suspension Inject 0.5 mL into the appropriate muscle as directed by prescriber See Admin Instructions. Repeat in 2-6 months 1 each 1     No facility-administered medications prior to visit.        Patient Active Problem List   Diagnosis   • Bladder instability   • COPD mixed type (CMS/HCC)   • Dyspareunia   •  Gastroesophageal reflux disease without esophagitis   • H/O recurrent urinary tract infection   • Mixed hyperlipidemia   • Essential hypertension   • IBS (irritable bowel syndrome)   • Prediabetes   • Primary osteoarthritis   • Migraine without aura and without status migrainosus, not intractable   • Encounter for immunization   • Healthcare maintenance   • Abnormal mammogram of right breast   • Smoker   • Pelvic relaxation   • Age-related osteoporosis without current pathological fracture   • Goiter   • Elevated TSH       Advanced Care Planning:  ACP discussion was held with the patient during this visit. Patient does not have an advance directive, information provided.    Review of Systems   Constitutional: Positive for fatigue. Negative for appetite change, chills, fever and unexpected weight change.   HENT: Positive for congestion and rhinorrhea. Negative for ear pain, postnasal drip, sinus pressure, sneezing, sore throat and voice change.    Eyes: Negative for visual disturbance.   Respiratory: Positive for cough, shortness of breath and wheezing. Negative for stridor.    Cardiovascular: Negative for chest pain, palpitations and leg swelling.   Gastrointestinal: Negative for abdominal pain, blood in stool, constipation, diarrhea, nausea and vomiting.   Genitourinary: Positive for dysuria (intermittent), frequency, pelvic pain (pressure) and urgency. Negative for difficulty urinating, hematuria and vaginal discharge.   Musculoskeletal: Negative for arthralgias, back pain and myalgias.   Skin: Negative for rash.   Neurological: Positive for dizziness (chronic-intermittent - responds to meclizine) and headaches (chronic - intermittent - stable). Negative for weakness and numbness.   Psychiatric/Behavioral: Negative for dysphoric mood and sleep disturbance. The patient is not nervous/anxious.      Compared to one year ago, the patient feels her physical health is the same.  Compared to one year ago, the patient  "feels her mental health is the same.    Reviewed chart for potential of high risk medication in the elderly: yes  Reviewed chart for potential of harmful drug interactions in the elderly:yes    Objective         Vitals:    10/27/20 1301   BP: 126/76   Pulse: 71   Resp: 14   Temp: 96.1 °F (35.6 °C)   TempSrc: Tympanic   SpO2: 92%   Weight: 48.1 kg (106 lb)   Height: 154.9 cm (61\")       Body mass index is 20.03 kg/m².  Discussed the patient's BMI with her. The BMI is in the acceptable range.    Physical Exam  Constitutional:       General: She is not in acute distress.     Appearance: Normal appearance. She is well-developed. She is not diaphoretic.      Comments: Accompanied by her .   HENT:      Head: Atraumatic.      Right Ear: Tympanic membrane, ear canal and external ear normal.      Left Ear: Tympanic membrane, ear canal and external ear normal.   Eyes:      Conjunctiva/sclera: Conjunctivae normal.   Neck:      Thyroid: Thyromegaly present. No thyroid mass.      Vascular: No carotid bruit or JVD.      Trachea: Trachea normal. No tracheal deviation.   Cardiovascular:      Rate and Rhythm: Normal rate and regular rhythm.      Heart sounds: Normal heart sounds, S1 normal and S2 normal. No murmur. No gallop.    Pulmonary:      Effort: Pulmonary effort is normal.      Breath sounds: Examination of the right-lower field reveals decreased breath sounds. Examination of the left-lower field reveals decreased breath sounds. Decreased breath sounds and wheezing (diffuse - mild) present. No rales.      Comments: Pulmonary hyperinflation  Abdominal:      General: Bowel sounds are normal. There is no distension or abdominal bruit.      Palpations: Abdomen is soft. There is no hepatomegaly, splenomegaly or mass.      Tenderness: There is no abdominal tenderness.      Hernia: No hernia is present.   Musculoskeletal:      Right lower leg: No edema.      Left lower leg: No edema.   Lymphadenopathy:      Head:      Right " side of head: No submental, submandibular, tonsillar, preauricular, posterior auricular or occipital adenopathy.      Left side of head: No submental, submandibular, tonsillar, preauricular, posterior auricular or occipital adenopathy.      Cervical: No cervical adenopathy.      Upper Body:      Right upper body: No supraclavicular adenopathy.      Left upper body: No supraclavicular adenopathy.   Skin:     General: Skin is warm.      Coloration: Skin is not cyanotic, jaundiced or pale.      Findings: No rash.      Nails: There is no clubbing.     Neurological:      Mental Status: She is alert and oriented to person, place, and time.      Cranial Nerves: No cranial nerve deficit.      Motor: No tremor.      Coordination: Coordination normal.      Gait: Gait normal.   Psychiatric:         Speech: Speech normal.         Behavior: Behavior normal.         Thought Content: Thought content normal.       Assessment/Plan   Medicare Risks and Personalized Health Plan  CMS Preventative Services Quick Reference  Advance Directive Discussion  Breast Cancer/Mammogram Screening  Cardiovascular risk  Diabetic Lab Screening   Immunizations Discussed/Encouraged (specific immunizations; Influenza and Shingrix )  Lung Cancer Risk  Osteoprorosis Risk  Tobacco Use/Dependance (use dotphrase .tobaccocessation for documentation)    The above risks/problems have been discussed with the patient.  Pertinent information has been shared with the patient in the After Visit Summary.  Follow up plans and orders are seen below in the Assessment/Plan Section.    Diagnoses and all orders for this visit:    1. Mixed hyperlipidemia   Encouraged to continue to work on her diet and exercise plan.  Continue current medication  Scheduled for updated labs just prior to her return.  -     Lipid Panel; Future    2. Essential hypertension   Hypertension: at goal. Evidence of target organ damage: none.   As above  ASA 81 daily  -     CBC & Differential;  Future  -     Comprehensive Metabolic Panel; Future    3. Prediabetes  As above.  Will continue to monitor  -     Hemoglobin A1c; Future    4. COPD mixed type (CMS/HCC)   COPD is stable.  Reminded of the importance of smoking cessation  Encouraged to remain off cigarettes  Given samples of beztri to try in place of trelegy.  We will continue with whichever patient finds most effective    5. Gastroesophageal reflux disease without esophagitis    6. Other irritable bowel syndrome    7. Goiter  -     TSH; Future    8. Primary osteoarthritis involving multiple joints    9. Age-related osteoporosis without current pathological fracture  Encouraged to continue to pursue weight bearing activities while exercising joint protection.  Will arrange an updated DEXA  -     DEXA Bone Density Axial; Future  -     Vitamin D 25 Hydroxy; Future    10. Pelvic relaxation  Patient remains uninterested in a gynecology or urology reassessment    11. Bladder instability  As above.    12. Elevated TSH  Will continue to monitor  -     TSH; Future    13. Migraine without aura and without status migrainosus, not intractable    14. Abnormal mammogram of right breast  Will arrange an updated right diagnostic mammogram as recommended by radiology  -     Mammo diagnostic digital tomosynthesis right w CAD; Future    15. Smoker  Reminded of the potential consequences of continued smoking along with the current options with respect to cessation.  Patient uninterested in pursuing this at present but will consider      16. H/O recurrent urinary tract infection    17. Healthcare maintenance  Recommended a flu shot  Encouraged to follow-up with [facility]  Reminded that she is due for an updated low-dose CT of the chest.  Patient would like to defer this to her return  -     Cancel: Fluarix/Fluzone/Afluria Quad>6 Months    Follow Up:  Return in about 3 months (around 1/27/2021).     An After Visit Summary and PPPS were given to the patient.

## 2020-10-27 NOTE — PATIENT INSTRUCTIONS
Advance Directive    Advance directives are legal documents that let you make choices ahead of time about your health care and medical treatment in case you become unable to communicate for yourself. Advance directives are a way for you to make known your wishes to family, friends, and health care providers. This can let others know about your end-of-life care if you become unable to communicate.  Discussing and writing advance directives should happen over time rather than all at once. Advance directives can be changed depending on your situation and what you want, even after you have signed the advance directives.  There are different types of advance directives, such as:  · Medical power of .  · Living will.  · Do not resuscitate (DNR) or do not attempt resuscitation (DNAR) order.  Health care proxy and medical power of   A health care proxy is also called a health care agent. This is a person who is appointed to make medical decisions for you in cases where you are unable to make the decisions yourself. Generally, people choose someone they know well and trust to represent their preferences. Make sure to ask this person for an agreement to act as your proxy. A proxy may have to exercise judgment in the event of a medical decision for which your wishes are not known.  A medical power of  is a legal document that names your health care proxy. Depending on the laws in your state, after the document is written, it may also need to be:  · Signed.  · Notarized.  · Dated.  · Copied.  · Witnessed.  · Incorporated into your medical record.  You may also want to appoint someone to manage your money in a situation in which you are unable to do so. This is called a durable power of  for finances. It is a separate legal document from the durable power of  for health care. You may choose the same person or someone different from your health care proxy to act as your agent in money  matters.  If you do not appoint a proxy, or if there is a concern that the proxy is not acting in your best interests, a court may appoint a guardian to act on your behalf.  Living will  A living will is a set of instructions that state your wishes about medical care when you cannot express them yourself. Health care providers should keep a copy of your living will in your medical record. You may want to give a copy to family members or friends. To alert caregivers in case of an emergency, you can place a card in your wallet to let them know that you have a living will and where they can find it. A living will is used if you become:  · Terminally ill.  · Disabled.  · Unable to communicate or make decisions.  Items to consider in your living will include:  · To use or not to use life-support equipment, such as dialysis machines and breathing machines (ventilators).  · A DNR or DNAR order. This tells health care providers not to use cardiopulmonary resuscitation (CPR) if breathing or heartbeat stops.  · To use or not to use tube feeding.  · To be given or not to be given food and fluids.  · Comfort (palliative) care when the goal becomes comfort rather than a cure.  · Donation of organs and tissues.  A living will does not give instructions for distributing your money and property if you should pass away.  DNR or DNAR  A DNR or DNAR order is a request not to have CPR in the event that your heart stops beating or you stop breathing. If a DNR or DNAR order has not been made and shared, a health care provider will try to help any patient whose heart has stopped or who has stopped breathing. If you plan to have surgery, talk with your health care provider about how your DNR or DNAR order will be followed if problems occur.  What if I do not have an advance directive?  If you do not have an advance directive, some states assign family decision makers to act on your behalf based on how closely you are related to them. Each  state has its own laws about advance directives. You may want to check with your health care provider, , or state representative about the laws in your state.  Summary  · Advance directives are the legal documents that allow you to make choices ahead of time about your health care and medical treatment in case you become unable to tell others about your care.  · The process of discussing and writing advance directives should happen over time. You can change the advance directives, even after you have signed them.  · Advance directives include DNR or DNAR orders, living [place], and designating an agent as your medical power of .  This information is not intended to replace advice given to you by your health care provider. Make sure you discuss any questions you have with your health care provider.  Document Released: 03/26/2009 Document Revised: 07/16/2020 Document Reviewed: 07/16/2020  Elsevier Patient Education © 2020 [employer]      Mammogram  A mammogram is a low energy X-ray of the breasts that is done to check for abnormal changes. This procedure can screen for and detect any changes that may indicate breast cancer. Mammograms are regularly done on women. A man may have a mammogram if he has a lump or swelling in his breast. A mammogram can also identify other changes and variations in the breast, such as:  · Inflammation of the breast tissue (mastitis).  · An infected area that contains a collection of pus (abscess).  · A fluid-filled sac (cyst).  · Fibrocystic changes. This is when breast tissue becomes denser, which can make the tissue feel rope-like or uneven under the skin.  · Tumors that are not cancerous (benign).  Tell a health care provider:  · About any allergies you have.  · If you have breast implants.  · If you have had previous breast disease, biopsy, or surgery.  · If you are breastfeeding.  · If you are younger than age 25.  · If you have a family history of breast  cancer.  · Whether you are pregnant or may be pregnant.  What are the risks?  Generally, this is a safe procedure. However, problems may occur, including:  · Exposure to radiation. Radiation levels are very low with this test.  · The results being misinterpreted.  · The need for further tests.  · The inability of the mammogram to detect certain cancers.  What happens before the procedure?  · Schedule your test about 1-2 weeks after your menstrual period if you are still menstruating. This is usually when your breasts are the least tender.  · If you have had a mammogram done at a different facility in the past, get the mammogram X-rays or have them sent to your current exam facility. The new and old images will be compared.  · Wash your breasts and underarms on the day of the test.  · Do not wear deodorants, perfumes, lotions, or powders anywhere on your body on the day of the test.  · Remove any jewelry from your neck.  · Wear clothes that you can change into and out of easily.  What happens during the procedure?    · You will undress from the waist up and put on a gown that opens in the front.  · You will  front of the X-ray machine.  · Each breast will be placed between two plastic or glass plates. The plates will compress your breast for a few seconds. Try to stay as relaxed as possible during the procedure. This does not cause any harm to your breasts and any discomfort you feel will be very brief.  · X-rays will be taken from different angles of each breast.  The procedure may vary among health care providers and hospitals.  What happens after the procedure?  · The mammogram will be examined by a specialist (radiologist).  · You may need to repeat certain parts of the test, depending on the quality of the images. This is commonly done if the radiologist needs a better view of the breast tissue.  · You may resume your normal activities.  · It is up to you to get the results of your procedure. Ask your  health care provider, or the department that is doing the procedure, when your results will be ready.  Summary  · A mammogram is a low energy X-ray of the breasts that is done to check for abnormal changes. A man may have a mammogram if he has a lump or swelling in his breast.  · If you have had a mammogram done at a different facility in the past, get the mammogram X-rays or have them sent to your current exam facility in order to compare them.  · Schedule your test about 1-2 weeks after your menstrual period if you are still menstruating.  · For this test, each breast will be placed between two plastic or glass plates. The plates will compress your breast for a few seconds.  · Ask when your test results will be ready. Make sure you get your test results.  This information is not intended to replace advice given to you by your health care provider. Make sure you discuss any questions you have with your health care provider.  Document Released: 12/15/2001 Document Revised: 08/08/2019 Document Reviewed: 08/08/2019  [employer] Patient Education © 2020 [employer]      Heart Disease Prevention  Heart disease is the leading cause of death in the world. Coronary artery disease is the most common cause of heart disease. This condition results when cholesterol and other substances (plaque) build up inside the walls of the blood vessels that supply your heart muscle (arteries). This buildup in arteries is called atherosclerosis. You can take actions to lower your risk of heart disease.  How can heart disease affect me?  Heart disease can cause many unpleasant symptoms and complications, such as:  · Chest pain (angina).  · Reduced or blocked blood flow to your heart. This can cause:  ? Irregular heartbeats (arrhythmias).  ? Heart attack.  ? Heart failure.  What can increase my risk?  The following factors may make you more likely to develop this condition:  · High blood pressure (hypertension).  · High  cholesterol.  · Smoking.  · A diet high in saturated fats or trans fats.  · Lack of physical activity.  · Obesity.  · Drinking too much alcohol.  · Diabetes.  · Having a family history of heart disease.  What actions can I take to prevent heart disease?  Nutrition    · Eat a heart-healthy eating plan as told by your health care provider. Examples include the DASH (Dietary Approaches to Stop Hypertension) eating plan or the Mediterranean diet.  · Generally, it is recommended that you:  ? Eat less salt (sodium). Ask your health care provider how much sodium is safe for you. Most people should have less than 2,300 mg each day.  ? Limit unhealthy fats, such as saturated and trans fats, in your diet. You can do this by eating low-fat dairy products, eating less red meat, and avoiding processed foods.  ? Eat healthy fats (omega-3 fatty acids). These are found in fish, such as mackerel or salmon.  ? Eat more fruits and vegetables. You should try to fill one-half of your plate with fruits and vegetables at each meal.  ? Eat more whole grains.  ? Avoid foods and drinks that have added sugars.  Lifestyle    · Get regular exercise. This is one of the most important things you can do for your health. Generally, it is recommended that you:  ? Exercise for at least 30 minutes on most days of the week (150 minutes each week). The exercise should increase your heart rate and make you sweat (aerobic exercise).  ? Add strength exercises on at least 2 days each week.  · Do not use any products that contain nicotine or tobacco, such as cigarettes and e-cigarettes. These can damage your heart and blood vessels. If you need help quitting, ask your health care provider.  Alcohol use  · Do not drink alcohol if:  ? Your health care provider tells you not to drink.  ? You are pregnant, may be pregnant, or are planning to become pregnant.  · If you drink alcohol, limit how much you have:  ? 0-1 drink a day for women.  ? 0-2 drinks a day for  men.  · Be aware of how much alcohol is in your drink. In the [place] one drink equals one typical bottle of beer (12 oz), one-half glass of wine (5 oz), or one shot of hard liquor (1½ oz).  Medicines  · Take over-the-counter and prescription medicines only as told by your health care provider.  · Ask your health care provider whether you should take an aspirin every day. Taking aspirin may help reduce your risk of heart disease and stroke.  · Depending on your risk factors, your health care provider may prescribe medicines to lower your risk of heart disease or to control related conditions. You may take medicine to:  ? Lower cholesterol.  ? Control blood pressure.  ? Control diabetes.  General information  · Keep your blood pressure under control, as recommended by your health care provider. For most healthy people, the upper number of your blood pressure (systolic) should be no higher than 120, and the lower number (diastolic) no higher than 80. Treatment may be needed if your blood pressure is higher than 130/80.  · Have your blood pressure checked at least every two years. Your health care provider may check your blood pressure more often if you have high blood pressure.  · After age 20, have your cholesterol checked every 4-6 years. If you have risk factors for heart disease, you may need to have it checked more frequently. Treatment may be needed if your cholesterol is high.  · Have your body mass index (BMI) checked every year. Your health care provider can calculate your BMI from your height and weight.  · Work with your health care provider to lose weight, if needed, or to maintain a healthy weight.  Where to find more information:  · Centers for Disease Control and Prevention: www.cdc.gov/heartdisease  · American Heart Association: www.heart.org  ? Take a free online heart disease risk quiz to better understand your personal risk factors.  Summary  · Heart disease is the leading cause of death in the  world.  · Heart disease can cause chest pain, abnormal heart rhythms, heart attack, and heart failure.  · High blood pressure, high cholesterol, and smoking are the main risk factors for heart disease, although other factors also contribute.  · You can take actions to lower your chances of developing heart disease. Work with your health care provider to reduce your risk by following a heart-healthy diet, being physically active, and controlling your weight, blood pressure, and cholesterol level.  This information is not intended to replace advice given to you by your health care provider. Make sure you discuss any questions you have with your health care provider.  Document Released: 08/01/2005 Document Revised: 01/02/2019 Document Reviewed: 01/02/2019  [employer] Patient Education © 2020 [employer]      Osteoporosis    Osteoporosis happens when your bones get thin and weak. This can cause your bones to break (fracture) more easily. You can do things at home to make your bones stronger.  Follow these instructions at home:    Activity  · Exercise as told by your doctor. Ask your doctor what activities are safe for you. You should do:  ? Exercises that make your muscles work to hold your body weight up (weight-bearing exercises). These include mili chi, yoga, and walking.  ? Exercises to make your muscles stronger. One example is lifting weights.  Lifestyle  · Limit alcohol intake to no more than 1 drink a day for nonpregnant women and 2 drinks a day for men. One drink equals 12 oz of beer, 5 oz of wine, or 1½ oz of hard liquor.  · Do not use any products that have nicotine or tobacco in them. These include cigarettes and e-cigarettes. If you need help quitting, ask your doctor.  Preventing falls  · Use tools to help you move around (mobility aids) as needed. These include canes, walkers, scooters, and crutches.  · Keep rooms well-lit and free of clutter.  · Put away things that could make you trip. These include  cords and rugs.  · Install safety rails on stairs. Install grab bars in bathrooms.  · Use rubber mats in slippery areas, like bathrooms.  · Wear shoes that:  ? Fit you well.  ? Support your feet.  ? Have closed toes.  ? Have rubber soles or low heels.  · Tell your doctor about all of the medicines you are taking. Some medicines can make you more likely to fall.  General instructions  · Eat plenty of calcium and vitamin D. These nutrients are good for your bones. Good sources of calcium and vitamin D include:  ? Some fatty fish, such as salmon and tuna.  ? Foods that have calcium and vitamin D added to them (fortified foods). For example, some breakfast cereals are fortified with calcium and vitamin D.  ? Egg yolks.  ? Cheese.  ? Liver.  · Take over-the-counter and prescription medicines only as told by your doctor.  · Keep all follow-up visits as told by your doctor. This is important.  Contact a doctor if:  · You have not been tested (screened) for osteoporosis and you are:  ? A woman who is age 65 or older.  ? A man who is age 70 or older.  Get help right away if:  · You fall.  · You get hurt.  Summary  · Osteoporosis happens when your bones get thin and weak.  · Weak bones can break (fracture) more easily.  · Eat plenty of calcium and vitamin D. These nutrients are good for your bones.  · Tell your doctor about all of the medicines that you take.  This information is not intended to replace advice given to you by your health care provider. Make sure you discuss any questions you have with your health care provider.  Document Released: 03/11/2013 Document Revised: 11/30/2018 Document Reviewed: 10/12/2018  [employer] Patient Education © 2020 [employer]    Lung Cancer Screening  A lung cancer screening is a test that checks for lung cancer. Lung cancer screening is done to look for lung cancer in its very early stages, before it spreads and becomes harder to treat and before symptoms appear. Finding cancer early  improves the chances of successful treatment. It may save your life.  Should I be screened for lung cancer?  You should be screened for lung cancer if all of these apply:  · You currently smoke or you have quit smoking within the past 15 years.  · You are 55-74 years old. Screening may be recommended up to age 80 depending on your overall health and other factors.  · You are in good general health.  · You have a smoking history of 1 pack a day for 30 years or 2 packs a day for 15 years.  Screening may also be recommended if you are at high risk for the disease. You may be at high risk if:  · You have a family history of lung cancer.  · You have been exposed to asbestos.  · You have chronic obstructive pulmonary disease (COPD).  · You have a history of previous lung cancer.  How often should I be screened for lung cancer?    If you are at risk for lung cancer, it is recommended that you are screened once a year. The recommended screening test is a low-dose CT scan.  How can I lower my risk of lung cancer?  To lower your risk of developing lung cancer:  · If you smoke, stop smoking all tobacco products.  · Avoid secondhand smoke.  · Avoid exposure to radiation.  · Avoid exposure to radon gas. Have your home checked for radon regularly.  · Avoid things that cause cancer (carcinogens).  · Avoid living or working in places with high air pollution.  Where to find more information  Ask your health care provider about the risks and benefits of screening. More information and resources are available from these organizations:  · American Cancer Society (ACS): www.cancer.org  · American Lung Association: www.lung.org  Contact a health care provider if:  · You start to show symptoms of lung cancer, including:  ? Coughing that will not go away.  ? Wheezing.  ? Chest pain.  ? Coughing up blood.  ? Shortness of breath.  ? Weight loss that cannot be explained.  ? Constant fatigue.  Summary  · Lung cancer screening may find lung  cancer before symptoms appear. Finding cancer early improves the chances of successful treatment. It may save your life.  · If you are at risk for lung cancer, it is recommended that you are screened once a year. The recommended screening test is a low-dose CT scan.  · You can make lifestyle changes to lower your risk of lung cancer.  · Ask your health care provider about the risks and benefits of screening.  This information is not intended to replace advice given to you by your health care provider. Make sure you discuss any questions you have with your health care provider.  Document Released: 11/08/2017 Document Revised: 04/10/2020 Document Reviewed: 11/08/2017  Elsevier Patient Education © 2020 [employer]

## 2020-10-28 VITALS
OXYGEN SATURATION: 92 % | TEMPERATURE: 96.1 F | HEART RATE: 71 BPM | BODY MASS INDEX: 20.01 KG/M2 | SYSTOLIC BLOOD PRESSURE: 126 MMHG | RESPIRATION RATE: 14 BRPM | DIASTOLIC BLOOD PRESSURE: 76 MMHG | WEIGHT: 106 LBS | HEIGHT: 61 IN

## 2020-10-28 RX ORDER — ASPIRIN 81 MG/1
81 TABLET ORAL DAILY
Qty: 30 TABLET | Refills: 5 | Status: SHIPPED | OUTPATIENT
Start: 2020-10-28 | End: 2021-09-16 | Stop reason: SDUPTHER

## 2020-11-10 DIAGNOSIS — F17.200 SMOKER: Primary | ICD-10-CM

## 2020-11-10 DIAGNOSIS — Z00.00 HEALTHCARE MAINTENANCE: ICD-10-CM

## 2020-11-10 DIAGNOSIS — Z87.891 PERSONAL HISTORY OF NICOTINE DEPENDENCE: ICD-10-CM

## 2020-11-19 ENCOUNTER — HOSPITAL ENCOUNTER (OUTPATIENT)
Dept: BONE DENSITY | Facility: HOSPITAL | Age: 70
Discharge: HOME | End: 2020-11-19

## 2020-11-19 ENCOUNTER — HOSPITAL ENCOUNTER (OUTPATIENT)
Dept: MAMMOGRAPHY | Facility: HOSPITAL | Age: 70
Discharge: HOME | End: 2020-11-19

## 2020-11-19 DIAGNOSIS — M81.0 AGE-RELATED OSTEOPOROSIS WITHOUT CURRENT PATHOLOGICAL FRACTURE: ICD-10-CM

## 2020-11-19 DIAGNOSIS — R92.8 ABNORMAL MAMMOGRAM OF RIGHT BREAST: ICD-10-CM

## 2020-11-19 PROCEDURE — 77065 DX MAMMO INCL CAD UNI: CPT

## 2020-11-19 PROCEDURE — 77080 DXA BONE DENSITY AXIAL: CPT | Performed by: RADIOLOGY

## 2020-11-19 PROCEDURE — 77065 DX MAMMO INCL CAD UNI: CPT | Performed by: RADIOLOGY

## 2020-11-19 PROCEDURE — G0279 TOMOSYNTHESIS, MAMMO: HCPCS

## 2020-11-19 PROCEDURE — 77080 DXA BONE DENSITY AXIAL: CPT

## 2020-11-19 PROCEDURE — G0279 TOMOSYNTHESIS, MAMMO: HCPCS | Performed by: RADIOLOGY

## 2020-11-30 ENCOUNTER — HOSPITAL ENCOUNTER (OUTPATIENT)
Dept: CT IMAGING | Facility: HOSPITAL | Age: 70
Discharge: HOME | End: 2020-11-30
Admitting: GENERAL PRACTICE

## 2020-11-30 DIAGNOSIS — Z87.891 PERSONAL HISTORY OF NICOTINE DEPENDENCE: ICD-10-CM

## 2020-11-30 DIAGNOSIS — F17.200 SMOKER: ICD-10-CM

## 2020-11-30 DIAGNOSIS — Z00.00 HEALTHCARE MAINTENANCE: ICD-10-CM

## 2020-11-30 PROCEDURE — G0297 LDCT FOR LUNG CA SCREEN: HCPCS | Performed by: RADIOLOGY

## 2020-11-30 PROCEDURE — G0297 LDCT FOR LUNG CA SCREEN: HCPCS

## 2020-12-02 NOTE — PROGRESS NOTES
Spoke with pt about the following per Dr. Alvarado. [facility]    -- Please let patient know that low dose CT OK. Will be due for next in one year.

## 2021-01-12 DIAGNOSIS — E78.2 MIXED HYPERLIPIDEMIA: ICD-10-CM

## 2021-01-12 RX ORDER — ATORVASTATIN CALCIUM 40 MG/1
TABLET, FILM COATED ORAL
Qty: 30 TABLET | Refills: 5 | Status: SHIPPED | OUTPATIENT
Start: 2021-01-12 | End: 2021-07-21

## 2021-01-14 DIAGNOSIS — I10 ESSENTIAL HYPERTENSION: ICD-10-CM

## 2021-01-14 RX ORDER — DILTIAZEM HYDROCHLORIDE 360 MG/1
CAPSULE, EXTENDED RELEASE ORAL
Qty: 30 CAPSULE | Refills: 5 | Status: SHIPPED | OUTPATIENT
Start: 2021-01-14 | End: 2021-01-19 | Stop reason: SDUPTHER

## 2021-01-19 DIAGNOSIS — I10 ESSENTIAL HYPERTENSION: ICD-10-CM

## 2021-01-19 RX ORDER — DILTIAZEM HYDROCHLORIDE 360 MG/1
360 CAPSULE, EXTENDED RELEASE ORAL DAILY
Qty: 30 CAPSULE | Refills: 5 | Status: SHIPPED | OUTPATIENT
Start: 2021-01-19 | End: 2021-06-30

## 2021-02-09 ENCOUNTER — OFFICE VISIT (OUTPATIENT)
Dept: FAMILY MEDICINE CLINIC | Facility: CLINIC | Age: 71
End: 2021-02-09

## 2021-02-09 DIAGNOSIS — E04.9 GOITER: ICD-10-CM

## 2021-02-09 DIAGNOSIS — Z00.00 HEALTHCARE MAINTENANCE: ICD-10-CM

## 2021-02-09 DIAGNOSIS — I10 ESSENTIAL HYPERTENSION: ICD-10-CM

## 2021-02-09 DIAGNOSIS — I25.10 CORONARY ARTERY CALCIFICATION SEEN ON CT SCAN: ICD-10-CM

## 2021-02-09 DIAGNOSIS — F17.200 SMOKER: ICD-10-CM

## 2021-02-09 DIAGNOSIS — K58.8 OTHER IRRITABLE BOWEL SYNDROME: ICD-10-CM

## 2021-02-09 DIAGNOSIS — R79.89 ELEVATED TSH: ICD-10-CM

## 2021-02-09 DIAGNOSIS — M81.0 AGE-RELATED OSTEOPOROSIS WITHOUT CURRENT PATHOLOGICAL FRACTURE: ICD-10-CM

## 2021-02-09 DIAGNOSIS — R73.03 PREDIABETES: ICD-10-CM

## 2021-02-09 DIAGNOSIS — N81.89 PELVIC RELAXATION: ICD-10-CM

## 2021-02-09 DIAGNOSIS — M15.9 PRIMARY OSTEOARTHRITIS INVOLVING MULTIPLE JOINTS: ICD-10-CM

## 2021-02-09 DIAGNOSIS — G43.009 MIGRAINE WITHOUT AURA AND WITHOUT STATUS MIGRAINOSUS, NOT INTRACTABLE: ICD-10-CM

## 2021-02-09 DIAGNOSIS — E78.2 MIXED HYPERLIPIDEMIA: Primary | ICD-10-CM

## 2021-02-09 DIAGNOSIS — K21.9 GASTROESOPHAGEAL REFLUX DISEASE WITHOUT ESOPHAGITIS: ICD-10-CM

## 2021-02-09 DIAGNOSIS — N32.89 BLADDER INSTABILITY: ICD-10-CM

## 2021-02-09 DIAGNOSIS — J44.9 COPD MIXED TYPE (HCC): ICD-10-CM

## 2021-02-09 DIAGNOSIS — R92.8 ABNORMAL MAMMOGRAM OF RIGHT BREAST: ICD-10-CM

## 2021-02-09 DIAGNOSIS — Z87.440 H/O RECURRENT URINARY TRACT INFECTION: ICD-10-CM

## 2021-02-09 PROCEDURE — 99214 OFFICE O/P EST MOD 30 MIN: CPT | Performed by: GENERAL PRACTICE

## 2021-02-09 NOTE — PROGRESS NOTES
Subjective   Albino Estrella is a 70 y.o. female.     History of Present Illness     COPD  The patient has a history of intermittent SOB, cough and wheezing.  She denies any chest pain or hemoptysis. She remains on trelegy and feels that it has helped considerably. She continues to use nebulized albuterol 4 times daily. There is no history of any fever, chills, or night sweats.  She continues to smoke 1 pack per day. Low dose CT of the chest performed on 4/2/18 was reported as showing a 15 mm RLL pulmonary nodule, COPD, mild coronary artery vascular calcifications, and a cyst of the left upper kidney. PET scan performed on 4/27/18 revealed no hypermetabolism of the lesion noted on CT but a diffusely enlarged and hypermetabolic thyroid. U/S of the kidneys performed the same day was unremarkable. Follow up CT of the chest was performed on 9/25/18 and revealed a decrease in the RLL pulmonary nodule to 13 mm.  Her most recent study performed on 11/30/2020 was stable and a 1 year follow-up recommended    Hypertension  Home blood pressure readings: not doing. Associated signs and symptoms: dyspnea.  She continues to deny any chest pain, palpitations, orthopnea, paroxysmal nocturnal dyspnea or peripheral edema. Current antihypertensive medications includes diltiazem and spironolactone.     Dyslipidemia  Compliance with treatment has been fair. The patient exercises intermittently. She remains on atorvastatin with no apparent side effects    Pelvic Pressure  She continues to experience pelvic pressure with prolonged standing, pushing, pulling, or lifting. This is associated with urinary frequency, urgency, dysuria, and occasional incontinence. She continues to deny any hematuria, vaginal bleeding or discharge, change in her bowel habits, hematochezia, melena, fever or chills. She has a history of recurrent urinary tract infections.  She was tried on tolterodine but stopped it due to a lack of effect and increased fatigue.   Underwent a gynecology assessment with no significant abnormalities found on history or physical exam.  Offered a cystoscopy but decided to defer this for now.    Imaging  Mammogram performed on 5/7/2020 was reported as showing an asymmetry about the inferior aspect of the right breast.  Compression views performed on 5/14/2020 revealed some improvement and a six-month follow-up diagnostic right mammogram was recommended.  This was performed on 11/19/2020 and was reported as showing a further improvement.  A return to routine screening on 5/7/2021 was recommended.  DEXA scan performed the same day returned with a T score -4.6.  She stopped alendronate sometime ago as she felt it was causing headaches    The following portions of the patient's history were reviewed and updated as appropriate: allergies, current medications, past medical history, past social history and problem list.    Review of Systems   Constitutional: Positive for fatigue. Negative for appetite change, chills, fever and unexpected weight change.   HENT: Positive for congestion and rhinorrhea. Negative for ear pain, postnasal drip, sinus pressure, sneezing, sore throat and voice change.    Eyes: Negative for visual disturbance.   Respiratory: Positive for cough, shortness of breath and wheezing. Negative for stridor.    Cardiovascular: Negative for chest pain, palpitations and leg swelling.   Gastrointestinal: Negative for abdominal pain, blood in stool, constipation, diarrhea, nausea and vomiting.   Genitourinary: Positive for dysuria (intermittent), frequency, pelvic pain (pressure) and urgency. Negative for difficulty urinating, hematuria and vaginal discharge.   Musculoskeletal: Negative for arthralgias, back pain and myalgias.   Skin: Negative for rash.   Neurological: Positive for dizziness (chronic-intermittent - responds to meclizine) and headaches (chronic - intermittent - stable). Negative for weakness and numbness.    Psychiatric/Behavioral: Negative for dysphoric mood and sleep disturbance. The patient is not nervous/anxious.      Objective   Physical Exam  Constitutional:       General: She is not in acute distress.     Appearance: Normal appearance. She is well-developed. She is not diaphoretic.      Comments: Accompanied by her . Bright and in fair spirits. No apparent distress. No pallor, jaundice, diaphoresis, or cyanosis.   HENT:      Head: Atraumatic.      Right Ear: Tympanic membrane, ear canal and external ear normal.      Left Ear: Tympanic membrane, ear canal and external ear normal.   Eyes:      Conjunctiva/sclera: Conjunctivae normal.   Neck:      Thyroid: Thyromegaly present. No thyroid mass.      Vascular: No carotid bruit or JVD.      Trachea: Trachea normal. No tracheal deviation.   Cardiovascular:      Rate and Rhythm: Normal rate and regular rhythm.      Heart sounds: Normal heart sounds, S1 normal and S2 normal. No murmur. No gallop.    Pulmonary:      Effort: Pulmonary effort is normal.      Breath sounds: Examination of the right-lower field reveals decreased breath sounds. Examination of the left-lower field reveals decreased breath sounds. Decreased breath sounds and wheezing (diffuse - mild) present. No rales.      Comments: Pulmonary hyperinflation  Abdominal:      General: Bowel sounds are normal. There is no distension or abdominal bruit.      Palpations: Abdomen is soft. There is no hepatomegaly, splenomegaly or mass.      Tenderness: There is no abdominal tenderness.      Hernia: No hernia is present.   Musculoskeletal:      Right lower leg: No edema.      Left lower leg: No edema.   Lymphadenopathy:      Head:      Right side of head: No submental, submandibular, tonsillar, preauricular, posterior auricular or occipital adenopathy.      Left side of head: No submental, submandibular, tonsillar, preauricular, posterior auricular or occipital adenopathy.      Cervical: No cervical  adenopathy.      Upper Body:      Right upper body: No supraclavicular adenopathy.      Left upper body: No supraclavicular adenopathy.   Skin:     General: Skin is warm.      Coloration: Skin is not cyanotic, jaundiced or pale.      Findings: No rash.      Nails: There is no clubbing.     Neurological:      Mental Status: She is alert and oriented to person, place, and time.      Cranial Nerves: No cranial nerve deficit.      Motor: No tremor.      Coordination: Coordination normal.      Gait: Gait normal.   Psychiatric:         Attention and Perception: Attention normal.         Mood and Affect: Mood normal.         Speech: Speech normal.         Behavior: Behavior normal.         Thought Content: Thought content normal.       Assessment/Plan   Problems Addressed this Visit        Cardiac and Vasculature    Essential hypertension   Hypertension: at goal. Evidence of target organ damage: coronary artery calcifications on previous imaging.  Encouraged to continue to work on diet and exercise plan.   Continue current medication    Mixed hyperlipidemia   As above.   Continue current medication.       Endocrine and Metabolic    Goiter  Symmetric with no nodules and diffuse hypermetabolism on previous PET scan  TSH slightly elevated however clinically euthyroid  We will continue to monitor    Prediabetes  As above.       Gastrointestinal Abdominal     Gastroesophageal reflux disease without esophagitis    IBS (irritable bowel syndrome)       Genitourinary and Reproductive         Bladder instability  Encouraged to report if any worse or if any new symptoms or concerns.    Relevant Orders    Urinalysis With Culture If Indicated - Urine, Random Void (Completed)    H/O recurrent urinary tract infection    Relevant Orders    Urinalysis With Culture If Indicated - Urine, Random Void (Completed)    Pelvic relaxation       Health Encounters    Healthcare maintenance  Patient has received COVID-19 #1 and is aware to return for  a second dose.   We will discuss an updated mammogram at her return       Musculoskeletal and Injuries    Age-related osteoporosis without current pathological fracture  Encouraged to continue to pursue weight bearing activities while exercising joint protection.  Reviewed medication options and agreed on a trial of prolia if her insurance will approve    Primary osteoarthritis       Neuro    Migraine without aura and without status migrainosus, not intractable       Pulmonary and Pneumonias    COPD mixed type (CMS/HCC)   COPD is stable.  Reminded of the importance of smoking cessation  Encouraged to remain as active as symptoms allow for  Continue current medication       Symptoms and Signs    Elevated TSH  As above.       Tobacco    Smoker      Diagnoses       Codes Comments    Mixed hyperlipidemia    -  Primary ICD-10-CM: E78.2  ICD-9-CM: 272.2     Essential hypertension     ICD-10-CM: I10  ICD-9-CM: 401.9     Prediabetes     ICD-10-CM: R73.03  ICD-9-CM: 790.29     Goiter     ICD-10-CM: E04.9  ICD-9-CM: 240.9     Other irritable bowel syndrome     ICD-10-CM: K58.8  ICD-9-CM: 564.1     Gastroesophageal reflux disease without esophagitis     ICD-10-CM: K21.9  ICD-9-CM: 530.81     Pelvic relaxation     ICD-10-CM: N81.89  ICD-9-CM: 618.89     Healthcare maintenance     ICD-10-CM: Z00.00  ICD-9-CM: V70.0     Primary osteoarthritis involving multiple joints     ICD-10-CM: M89.49  ICD-9-CM: 715.98     Age-related osteoporosis without current pathological fracture     ICD-10-CM: M81.0  ICD-9-CM: 733.01     Migraine without aura and without status migrainosus, not intractable     ICD-10-CM: G43.009  ICD-9-CM: 346.10     COPD mixed type (CMS/HCC)     ICD-10-CM: J44.9  ICD-9-CM: 496     Elevated TSH     ICD-10-CM: R79.89  ICD-9-CM: 794.5     Smoker     ICD-10-CM: F17.200  ICD-9-CM: 305.1     Bladder instability     ICD-10-CM: N32.89  ICD-9-CM: 596.89     H/O recurrent urinary tract infection     ICD-10-CM:  Z87.440  ICD-9-CM: V13.02     Abnormal mammogram of right breast     ICD-10-CM: R92.8  ICD-9-CM: 793.80

## 2021-02-10 VITALS
HEIGHT: 61 IN | TEMPERATURE: 97.1 F | WEIGHT: 103 LBS | OXYGEN SATURATION: 95 % | DIASTOLIC BLOOD PRESSURE: 60 MMHG | HEART RATE: 71 BPM | SYSTOLIC BLOOD PRESSURE: 124 MMHG | RESPIRATION RATE: 14 BRPM | BODY MASS INDEX: 19.45 KG/M2

## 2021-02-10 PROBLEM — I25.10 CORONARY ARTERY CALCIFICATION SEEN ON CT SCAN: Status: ACTIVE | Noted: 2021-02-10

## 2021-02-10 LAB
25(OH)D3+25(OH)D2 SERPL-MCNC: 38.6 NG/ML (ref 30–100)
ALBUMIN SERPL-MCNC: 4.8 G/DL (ref 3.5–5.2)
ALBUMIN/GLOB SERPL: 1.5 G/DL
ALP SERPL-CCNC: 89 U/L (ref 39–117)
ALT SERPL-CCNC: 13 U/L (ref 1–33)
APPEARANCE UR: CLEAR
AST SERPL-CCNC: 16 U/L (ref 1–32)
BACTERIA #/AREA URNS HPF: ABNORMAL /HPF
BASOPHILS # BLD AUTO: ABNORMAL 10*3/UL
BILIRUB SERPL-MCNC: 0.5 MG/DL (ref 0–1.2)
BILIRUB UR QL STRIP: NEGATIVE
BUN SERPL-MCNC: 13 MG/DL (ref 8–23)
BUN/CREAT SERPL: 17.8 (ref 7–25)
CALCIUM SERPL-MCNC: 9.9 MG/DL (ref 8.6–10.5)
CHLORIDE SERPL-SCNC: 99 MMOL/L (ref 98–107)
CHOLEST SERPL-MCNC: 145 MG/DL (ref 0–200)
CO2 SERPL-SCNC: 26.6 MMOL/L (ref 22–29)
COLOR UR: YELLOW
CREAT SERPL-MCNC: 0.73 MG/DL (ref 0.57–1)
CRYSTALS URNS MICRO: ABNORMAL
DIFFERENTIAL COMMENT: ABNORMAL
EOSINOPHIL # BLD AUTO: ABNORMAL 10*3/UL
EOSINOPHIL NFR BLD AUTO: ABNORMAL %
EPI CELLS #/AREA URNS HPF: ABNORMAL /HPF (ref 0–10)
ERYTHROCYTE [DISTWIDTH] IN BLOOD BY AUTOMATED COUNT: 12.2 % (ref 12.3–15.4)
GLOBULIN SER CALC-MCNC: 3.1 GM/DL
GLUCOSE SERPL-MCNC: 94 MG/DL (ref 65–99)
GLUCOSE UR QL: NEGATIVE
HBA1C MFR BLD: 5.4 % (ref 4.8–5.6)
HCT VFR BLD AUTO: 41.3 % (ref 34–46.6)
HDLC SERPL-MCNC: 66 MG/DL (ref 40–60)
HGB BLD-MCNC: 13.9 G/DL (ref 12–15.9)
HGB UR QL STRIP: NEGATIVE
KETONES UR QL STRIP: NEGATIVE
LDLC SERPL CALC-MCNC: 62 MG/DL (ref 0–100)
LEUKOCYTE ESTERASE UR QL STRIP: NEGATIVE
LYMPHOCYTES # BLD AUTO: ABNORMAL 10*3/UL
LYMPHOCYTES # BLD MANUAL: 4 10*3/MM3 (ref 0.7–3.1)
LYMPHOCYTES NFR BLD AUTO: ABNORMAL %
LYMPHOCYTES NFR BLD MANUAL: 32 % (ref 19.6–45.3)
MCH RBC QN AUTO: 32 PG (ref 26.6–33)
MCHC RBC AUTO-ENTMCNC: 33.7 G/DL (ref 31.5–35.7)
MCV RBC AUTO: 94.9 FL (ref 79–97)
MICRO URNS: NORMAL
MICRO URNS: NORMAL
MONOCYTES # BLD MANUAL: 0.78 10*3/MM3 (ref 0.1–0.9)
MONOCYTES NFR BLD AUTO: ABNORMAL %
MONOCYTES NFR BLD MANUAL: 6.2 % (ref 5–12)
MUCOUS THREADS URNS QL MICRO: PRESENT /HPF
NEUTROPHILS # BLD MANUAL: 7.74 10*3/MM3 (ref 1.7–7)
NEUTROPHILS NFR BLD AUTO: ABNORMAL %
NEUTROPHILS NFR BLD MANUAL: 61.9 % (ref 42.7–76)
NITRITE UR QL STRIP: NEGATIVE
PH UR STRIP: 7 [PH] (ref 5–7.5)
PLATELET # BLD AUTO: 303 10*3/MM3 (ref 140–450)
PLATELET BLD QL SMEAR: ABNORMAL
POTASSIUM SERPL-SCNC: 4.3 MMOL/L (ref 3.5–5.2)
PROT SERPL-MCNC: 7.9 G/DL (ref 6–8.5)
PROT UR QL STRIP: NEGATIVE
RBC # BLD AUTO: 4.35 10*6/MM3 (ref 3.77–5.28)
RBC #/AREA URNS HPF: ABNORMAL /HPF (ref 0–2)
RBC MORPH BLD: ABNORMAL
SODIUM SERPL-SCNC: 136 MMOL/L (ref 136–145)
SP GR UR: 1.02 (ref 1–1.03)
TRIGL SERPL-MCNC: 90 MG/DL (ref 0–150)
TSH SERPL DL<=0.005 MIU/L-ACNC: 8.98 UIU/ML (ref 0.27–4.2)
UNIDENT CRYS URNS QL MICRO: PRESENT /HPF
URINALYSIS REFLEX: NORMAL
UROBILINOGEN UR STRIP-MCNC: 1 MG/DL (ref 0.2–1)
VLDLC SERPL CALC-MCNC: 17 MG/DL (ref 5–40)
WBC # BLD AUTO: 12.51 10*3/MM3 (ref 3.4–10.8)
WBC #/AREA URNS HPF: ABNORMAL /HPF (ref 0–5)

## 2021-02-17 ENCOUNTER — PRIOR AUTHORIZATION (OUTPATIENT)
Dept: FAMILY MEDICINE CLINIC | Facility: CLINIC | Age: 71
End: 2021-02-17

## 2021-02-17 DIAGNOSIS — M81.0 AGE-RELATED OSTEOPOROSIS WITHOUT CURRENT PATHOLOGICAL FRACTURE: Primary | ICD-10-CM

## 2021-02-17 RX ORDER — DENOSUMAB 60 MG/ML
60 INJECTION SUBCUTANEOUS
Qty: 1 ML | Refills: 1 | Status: SHIPPED | OUTPATIENT
Start: 2021-02-17 | End: 2021-05-14 | Stop reason: SDUPTHER

## 2021-02-19 ENCOUNTER — TELEPHONE (OUTPATIENT)
Dept: FAMILY MEDICINE CLINIC | Facility: CLINIC | Age: 71
End: 2021-02-19

## 2021-02-19 NOTE — TELEPHONE ENCOUNTER
----- Message from Stan Alvarado MD sent at 2/17/2021  3:20 PM EST -----  Yay!  Please let her know that charley emailed a script to her pharmacy and she can drop by here for administration when she would like  ----- Message -----  From: Ariela Baxter MA  Sent: 2/17/2021   3:05 PM EST  To: Stan Alvarado MD    Approved!     ----- Message -----  From: Stan Alvarado MD  Sent: 2/10/2021  10:10 AM EST  To: Ariela Baxter MA    Please find out if her insurance will cover Prolia injections  Diagnosis -osteoporosis with a T score of -4.9  Intolerant to oral bisphosphonate

## 2021-02-24 ENCOUNTER — CLINICAL SUPPORT (OUTPATIENT)
Dept: FAMILY MEDICINE CLINIC | Facility: CLINIC | Age: 71
End: 2021-02-24

## 2021-02-24 DIAGNOSIS — M80.00XA AGE-RELATED OSTEOPOROSIS WITH CURRENT PATHOLOGICAL FRACTURE, INITIAL ENCOUNTER: Primary | ICD-10-CM

## 2021-02-24 PROCEDURE — 96372 THER/PROPH/DIAG INJ SC/IM: CPT | Performed by: GENERAL PRACTICE

## 2021-03-29 DIAGNOSIS — J44.9 COPD MIXED TYPE (HCC): ICD-10-CM

## 2021-03-29 NOTE — TELEPHONE ENCOUNTER
Caller: Albino Estrella    Relationship: Self    Best call back number: [phone]    Medication needed:   Fluticasone-Umeclidin-Vilant (Trelegy Ellipta) 100-62.5-25 MCG/INH aerosol powder   When do you need the refill by: ASAP    What additional details did the patient provide when requesting the medication:    Does the patient have less than a 3 day supply:  [x] Yes  [] No    What is the patient's preferred pharmacy:    [facility] #[id number] [address] AT [facility] [address] - [phone]  - [phone] FX

## 2021-05-14 ENCOUNTER — OFFICE VISIT (OUTPATIENT)
Dept: FAMILY MEDICINE CLINIC | Facility: CLINIC | Age: 71
End: 2021-05-14

## 2021-05-14 VITALS
WEIGHT: 104 LBS | BODY MASS INDEX: 19.63 KG/M2 | HEART RATE: 89 BPM | OXYGEN SATURATION: 98 % | DIASTOLIC BLOOD PRESSURE: 70 MMHG | SYSTOLIC BLOOD PRESSURE: 124 MMHG | TEMPERATURE: 97.1 F | RESPIRATION RATE: 14 BRPM | HEIGHT: 61 IN

## 2021-05-14 DIAGNOSIS — K58.8 OTHER IRRITABLE BOWEL SYNDROME: ICD-10-CM

## 2021-05-14 DIAGNOSIS — M15.9 PRIMARY OSTEOARTHRITIS INVOLVING MULTIPLE JOINTS: ICD-10-CM

## 2021-05-14 DIAGNOSIS — I10 ESSENTIAL HYPERTENSION: ICD-10-CM

## 2021-05-14 DIAGNOSIS — E04.9 GOITER: ICD-10-CM

## 2021-05-14 DIAGNOSIS — K21.9 GASTROESOPHAGEAL REFLUX DISEASE WITHOUT ESOPHAGITIS: ICD-10-CM

## 2021-05-14 DIAGNOSIS — Z00.00 HEALTHCARE MAINTENANCE: ICD-10-CM

## 2021-05-14 DIAGNOSIS — I25.10 CORONARY ARTERY CALCIFICATION SEEN ON CT SCAN: ICD-10-CM

## 2021-05-14 DIAGNOSIS — G43.009 MIGRAINE WITHOUT AURA AND WITHOUT STATUS MIGRAINOSUS, NOT INTRACTABLE: ICD-10-CM

## 2021-05-14 DIAGNOSIS — R79.89 ELEVATED TSH: ICD-10-CM

## 2021-05-14 DIAGNOSIS — N32.89 BLADDER INSTABILITY: ICD-10-CM

## 2021-05-14 DIAGNOSIS — R73.03 PREDIABETES: ICD-10-CM

## 2021-05-14 DIAGNOSIS — F17.200 SMOKER: ICD-10-CM

## 2021-05-14 DIAGNOSIS — Z12.31 ENCOUNTER FOR SCREENING MAMMOGRAM FOR BREAST CANCER: ICD-10-CM

## 2021-05-14 DIAGNOSIS — J44.9 COPD MIXED TYPE (HCC): ICD-10-CM

## 2021-05-14 DIAGNOSIS — N81.89 PELVIC RELAXATION: ICD-10-CM

## 2021-05-14 DIAGNOSIS — M81.0 AGE-RELATED OSTEOPOROSIS WITHOUT CURRENT PATHOLOGICAL FRACTURE: ICD-10-CM

## 2021-05-14 DIAGNOSIS — E78.2 MIXED HYPERLIPIDEMIA: Primary | ICD-10-CM

## 2021-05-14 PROBLEM — R92.8 ABNORMAL MAMMOGRAM OF RIGHT BREAST: Status: RESOLVED | Noted: 2017-01-12 | Resolved: 2021-05-14

## 2021-05-14 PROCEDURE — 99214 OFFICE O/P EST MOD 30 MIN: CPT | Performed by: GENERAL PRACTICE

## 2021-05-14 RX ORDER — DENOSUMAB 60 MG/ML
60 INJECTION SUBCUTANEOUS
Qty: 1 ML | Refills: 1 | Status: SHIPPED | OUTPATIENT
Start: 2021-05-14 | End: 2021-09-16 | Stop reason: SDUPTHER

## 2021-05-14 RX ORDER — PROMETHAZINE HYDROCHLORIDE 25 MG/1
25 TABLET ORAL EVERY 6 HOURS PRN
Qty: 30 TABLET | Refills: 0 | Status: SHIPPED | OUTPATIENT
Start: 2021-05-14 | End: 2021-07-28 | Stop reason: SDUPTHER

## 2021-05-14 NOTE — PROGRESS NOTES
Subjective   Albino Estrella is a 70 y.o. female.     History of Present Illness     COPD  She has a history of intermittent SOB, cough and wheezing.  There is no history of any chest pain or hemoptysis. She remains on trelegy and feels that it has helped considerably. She continues to use nebulized albuterol 4 times daily. There is no history of any fever, chills, or night sweats.  She continues to smoke 1 pack per day. Low dose CT of the chest performed on 4/2/18 was reported as showing a 15 mm RLL pulmonary nodule, COPD, mild coronary artery vascular calcifications, and a cyst of the left upper kidney. PET scan performed on 4/27/18 revealed no hypermetabolism of the lesion noted on CT but a diffusely enlarged and hypermetabolic thyroid. U/S of the kidneys performed the same day was unremarkable. Follow up CT of the chest was performed on 9/25/18 and revealed a decrease in the RLL pulmonary nodule to 13 mm.  Her most recent study performed on 11/30/2020 was stable and a 1 year follow-up recommended  Lab Results   Component Value Date    WBC 12.51 (H) 02/09/2021    HGB 13.9 02/09/2021    HCT 41.3 02/09/2021    MCV 94.9 02/09/2021     02/09/2021     Hypertension  Home blood pressure readings: not doing. Associated signs and symptoms: dyspnea.  She continues to deny any chest pain, palpitations, orthopnea, paroxysmal nocturnal dyspnea or peripheral edema. Current antihypertensive medications includes diltiazem and spironolactone.   Lab Results   Component Value Date    GLUCOSE 101 01/12/2017    BUN 13 02/09/2021    CREATININE 0.73 02/09/2021    EGFRIFNONA 79 02/09/2021    EGFRIFAFRI 96 02/09/2021    BCR 17.8 02/09/2021    K 4.3 02/09/2021    CO2 26.6 02/09/2021    CALCIUM 9.9 02/09/2021    PROTENTOTREF 7.9 02/09/2021    ALBUMIN 4.80 02/09/2021    LABIL2 1.5 02/09/2021    AST 16 02/09/2021    ALT 13 02/09/2021     Dyslipidemia  Compliance with treatment has been fair. The patient exercises intermittently. She  remains on atorvastatin with no apparent side effects  Lab Results   Component Value Date    CHOL 197 01/12/2017    CHLPL 145 02/09/2021    TRIG 90 02/09/2021    HDL 66 (H) 02/09/2021    LDL 62 02/09/2021     Pelvic Pressure  She continues to experience pelvic pressure with prolonged standing, pushing, pulling, or lifting. This is associated with urinary frequency, urgency, dysuria, and occasional incontinence. She continues to deny any hematuria, vaginal bleeding or discharge, change in her bowel habits, hematochezia, melena, fever or chills. She has a history of recurrent urinary tract infections.  She was tried on tolterodine but stopped it due to a lack of effect and increased fatigue.  Underwent a gynecology assessment with no significant abnormalities found on history or physical exam.  Offered a cystoscopy but decided to defer this for now.    Labs  Most recent vitamin D 38.6  Lab Results   Component Value Date    HGBA1C 5.40 02/09/2021     Lab Results   Component Value Date    TSH 8.980 (H) 02/09/2021     Imaging  Mammogram performed on 5/7/2020 was reported as showing an asymmetry about the inferior aspect of the right breast.  Compression views performed on 5/14/2020 revealed some improvement and a six-month follow-up diagnostic right mammogram was recommended.  This was performed on 11/19/2020 and was reported as showing a further improvement.  A return to routine screening on 5/7/2021 was recommended.  DEXA scan performed the same day returned with a T score -4.6.  She stopped alendronate sometime ago as she felt it was causing headaches.  She has received her first denosumab injection with no apparent side effects    The following portions of the patient's history were reviewed and updated as appropriate: allergies, current medications, past medical history, past social history and problem list.    Review of Systems   Constitutional: Positive for fatigue. Negative for appetite change, chills, fever and  unexpected weight change.   HENT: Positive for congestion and rhinorrhea. Negative for ear pain, postnasal drip, sinus pressure, sneezing, sore throat and voice change.    Eyes: Negative for visual disturbance.   Respiratory: Positive for cough, shortness of breath and wheezing. Negative for stridor.    Cardiovascular: Negative for chest pain, palpitations and leg swelling.   Gastrointestinal: Negative for abdominal pain, blood in stool, constipation, diarrhea, nausea and vomiting.   Genitourinary: Positive for dysuria (intermittent), frequency, pelvic pain (pressure) and urgency. Negative for difficulty urinating, hematuria and vaginal discharge.   Musculoskeletal: Negative for arthralgias, back pain and myalgias.   Skin: Negative for rash.   Neurological: Positive for dizziness (chronic-intermittent - responds to meclizine) and headaches (chronic - intermittent - stable). Negative for weakness and numbness.   Psychiatric/Behavioral: Negative for dysphoric mood and sleep disturbance. The patient is not nervous/anxious.      Objective   Physical Exam  Constitutional:       General: She is not in acute distress.     Appearance: Normal appearance. She is well-developed. She is not diaphoretic.      Comments: Accompanied by her . Bright and in fair spirits. No apparent distress. No pallor, jaundice, diaphoresis, or cyanosis.   HENT:      Head: Atraumatic.      Right Ear: Tympanic membrane, ear canal and external ear normal.      Left Ear: Tympanic membrane, ear canal and external ear normal.   Eyes:      Conjunctiva/sclera: Conjunctivae normal.   Neck:      Thyroid: Thyromegaly (right lobe more prominent then the left) present. No thyroid mass.      Vascular: No carotid bruit or JVD.      Trachea: Trachea normal. No tracheal deviation.   Cardiovascular:      Rate and Rhythm: Normal rate and regular rhythm.      Heart sounds: Normal heart sounds, S1 normal and S2 normal. No murmur heard.   No gallop.     Pulmonary:      Effort: Pulmonary effort is normal.      Breath sounds: Examination of the right-lower field reveals decreased breath sounds. Examination of the left-lower field reveals decreased breath sounds. Decreased breath sounds and wheezing (diffuse - mild) present. No rales.      Comments: Pulmonary hyperinflation  Abdominal:      General: Bowel sounds are normal. There is no distension.   Musculoskeletal:      Right lower leg: No edema.      Left lower leg: No edema.   Lymphadenopathy:      Head:      Right side of head: No submental, submandibular, tonsillar, preauricular, posterior auricular or occipital adenopathy.      Left side of head: No submental, submandibular, tonsillar, preauricular, posterior auricular or occipital adenopathy.      Cervical: No cervical adenopathy.      Upper Body:      Right upper body: No supraclavicular adenopathy.      Left upper body: No supraclavicular adenopathy.   Skin:     General: Skin is warm.      Coloration: Skin is not cyanotic, jaundiced or pale.      Findings: No rash.      Nails: There is no clubbing.   Neurological:      Mental Status: She is alert and oriented to person, place, and time.      Cranial Nerves: No cranial nerve deficit.      Motor: No tremor.      Coordination: Coordination normal.      Gait: Gait normal.   Psychiatric:         Attention and Perception: Attention normal.         Mood and Affect: Mood normal.         Speech: Speech normal.         Behavior: Behavior normal.         Thought Content: Thought content normal.       Assessment/Plan   Problems Addressed this Visit        Cardiac and Vasculature    Coronary artery calcification seen on CT scan  Reminded regarding risk factor modification with an emphasis on tobacco cessation.  Continue current medication    Essential hypertension   Hypertension: at goal. Evidence of target organ damage: coronary artery calcifications on previous imaging.  Encouraged to continue to work on diet and  exercise plan.   Continue current medication    Mixed hyperlipidemia   As above.   Continue current medication.       Endocrine and Metabolic    Goiter  We will arrange an ultrasound of the thyroid    Relevant Orders    US Thyroid    Prediabetes  As above.  Will continue to monitor       Gastrointestinal Abdominal     Gastroesophageal reflux disease without esophagitis    Relevant Medications    promethazine (PHENERGAN) 25 MG tablet    IBS (irritable bowel syndrome)       Genitourinary and Reproductive     Bladder instability    Pelvic relaxation       Health Encounters    Healthcare maintenance  Patient has received both doses of the COVID-19 vaccine.  Reminded that she is due for Shingrix  We will arrange an updated mammogram    Relevant Orders    Mammo Screening Digital Tomosynthesis Bilateral With CAD       Musculoskeletal and Injuries    Age-related osteoporosis without current pathological fracture  Encouraged to continue to pursue weight bearing activities while exercising joint protection.  Continue denosumab injections    Primary osteoarthritis       Neuro    Migraine without aura and without status migrainosus, not intractable       Pulmonary and Pneumonias    COPD mixed type (CMS/HCC)     COPD is stable.  Reminded of the importance of smoking cessation  Encouraged to remain as active as symptoms allow for  Continue current medication           Symptoms and Signs    Elevated TSH  Clinically euthyroid.  Will continue to monitor       Tobacco    Smoker      Diagnoses       Codes Comments    Mixed hyperlipidemia    -  Primary ICD-10-CM: E78.2  ICD-9-CM: 272.2     Essential hypertension     ICD-10-CM: I10  ICD-9-CM: 401.9     Coronary artery calcification seen on CT scan     ICD-10-CM: I25.10  ICD-9-CM: 414.00     Prediabetes     ICD-10-CM: R73.03  ICD-9-CM: 790.29     Goiter     ICD-10-CM: E04.9  ICD-9-CM: 240.9     Other irritable bowel syndrome     ICD-10-CM: K58.8  ICD-9-CM: 564.1     Gastroesophageal  reflux disease without esophagitis     ICD-10-CM: K21.9  ICD-9-CM: 530.81     Pelvic relaxation     ICD-10-CM: N81.89  ICD-9-CM: 618.89     Bladder instability     ICD-10-CM: N32.89  ICD-9-CM: 596.89     Healthcare maintenance     ICD-10-CM: Z00.00  ICD-9-CM: V70.0     Primary osteoarthritis involving multiple joints     ICD-10-CM: M89.49  ICD-9-CM: 715.98     Age-related osteoporosis without current pathological fracture     ICD-10-CM: M81.0  ICD-9-CM: 733.01     Migraine without aura and without status migrainosus, not intractable     ICD-10-CM: G43.009  ICD-9-CM: 346.10     COPD mixed type (CMS/HCC)     ICD-10-CM: J44.9  ICD-9-CM: 496     Elevated TSH     ICD-10-CM: R79.89  ICD-9-CM: 794.5     Smoker     ICD-10-CM: F17.200  ICD-9-CM: 305.1     Encounter for screening mammogram for breast cancer     ICD-10-CM: Z12.31  ICD-9-CM: V76.12

## 2021-05-15 DIAGNOSIS — K21.9 GASTROESOPHAGEAL REFLUX DISEASE WITHOUT ESOPHAGITIS: ICD-10-CM

## 2021-05-17 RX ORDER — PANTOPRAZOLE SODIUM 40 MG/1
TABLET, DELAYED RELEASE ORAL
Qty: 60 TABLET | Refills: 5 | Status: SHIPPED | OUTPATIENT
Start: 2021-05-17 | End: 2021-09-16 | Stop reason: SDUPTHER

## 2021-05-20 ENCOUNTER — HOSPITAL ENCOUNTER (OUTPATIENT)
Dept: ULTRASOUND IMAGING | Facility: HOSPITAL | Age: 71
Discharge: HOME | End: 2021-05-20
Admitting: GENERAL PRACTICE

## 2021-05-20 DIAGNOSIS — E04.9 GOITER: ICD-10-CM

## 2021-05-20 PROCEDURE — 76536 US EXAM OF HEAD AND NECK: CPT

## 2021-05-20 PROCEDURE — 76536 US EXAM OF HEAD AND NECK: CPT | Performed by: RADIOLOGY

## 2021-06-15 ENCOUNTER — HOSPITAL ENCOUNTER (OUTPATIENT)
Dept: MAMMOGRAPHY | Facility: HOSPITAL | Age: 71
Discharge: HOME | End: 2021-06-15
Admitting: GENERAL PRACTICE

## 2021-06-15 DIAGNOSIS — Z00.00 HEALTHCARE MAINTENANCE: ICD-10-CM

## 2021-06-15 DIAGNOSIS — Z12.31 ENCOUNTER FOR SCREENING MAMMOGRAM FOR BREAST CANCER: ICD-10-CM

## 2021-06-15 PROCEDURE — 77067 SCR MAMMO BI INCL CAD: CPT

## 2021-06-15 PROCEDURE — 77067 SCR MAMMO BI INCL CAD: CPT | Performed by: RADIOLOGY

## 2021-06-15 PROCEDURE — 77063 BREAST TOMOSYNTHESIS BI: CPT

## 2021-06-15 PROCEDURE — 77063 BREAST TOMOSYNTHESIS BI: CPT | Performed by: RADIOLOGY

## 2021-06-24 DIAGNOSIS — J44.9 CHRONIC OBSTRUCTIVE PULMONARY DISEASE, UNSPECIFIED COPD TYPE (HCC): ICD-10-CM

## 2021-06-24 RX ORDER — IPRATROPIUM BROMIDE AND ALBUTEROL SULFATE 2.5; .5 MG/3ML; MG/3ML
3 SOLUTION RESPIRATORY (INHALATION) 4 TIMES DAILY PRN
Qty: 540 ML | Refills: 5 | Status: SHIPPED | OUTPATIENT
Start: 2021-06-24 | End: 2021-09-16 | Stop reason: SDUPTHER

## 2021-06-30 DIAGNOSIS — G43.009 MIGRAINE WITHOUT AURA AND WITHOUT STATUS MIGRAINOSUS, NOT INTRACTABLE: ICD-10-CM

## 2021-06-30 DIAGNOSIS — I10 ESSENTIAL HYPERTENSION: ICD-10-CM

## 2021-06-30 RX ORDER — SUMATRIPTAN 100 MG/1
TABLET, FILM COATED ORAL
Qty: 9 TABLET | Refills: 2 | Status: SHIPPED | OUTPATIENT
Start: 2021-06-30 | End: 2021-09-16 | Stop reason: SDUPTHER

## 2021-06-30 RX ORDER — DILTIAZEM HYDROCHLORIDE 360 MG/1
CAPSULE, EXTENDED RELEASE ORAL
Qty: 30 CAPSULE | Refills: 2 | Status: SHIPPED | OUTPATIENT
Start: 2021-06-30 | End: 2021-09-16 | Stop reason: SDUPTHER

## 2021-07-20 NOTE — TELEPHONE ENCOUNTER
Patient is scheduled on jan 10th @ 11. That was the first appointment they could get her in.     ----- Message from Ema Veloz Rep sent at 12/21/2018  9:55 AM EST -----      ----- Message -----  From: Stan Alvarado MD  Sent: 12/20/2018   5:39 PM  To: Ariela Baxter MA, #    Can try to arrange but im doubtful  ----- Message -----  From: Serena Velasquez RegSched Rep  Sent: 12/14/2018  10:14 AM  To: Stan Alvarado MD, #    Was seen at [facility], that provider recommended a cystoscopy.. Albino has deductible met and would like to get it done before 1st of the yr  With Dr Hdz      I have requested records        Leaving Home is Contraindicated...

## 2021-07-21 DIAGNOSIS — E78.2 MIXED HYPERLIPIDEMIA: ICD-10-CM

## 2021-07-21 RX ORDER — ATORVASTATIN CALCIUM 40 MG/1
TABLET, FILM COATED ORAL
Qty: 30 TABLET | Refills: 5 | Status: SHIPPED | OUTPATIENT
Start: 2021-07-21 | End: 2021-09-16 | Stop reason: SDUPTHER

## 2021-07-28 DIAGNOSIS — K21.9 GASTROESOPHAGEAL REFLUX DISEASE WITHOUT ESOPHAGITIS: ICD-10-CM

## 2021-07-28 RX ORDER — PROMETHAZINE HYDROCHLORIDE 25 MG/1
25 TABLET ORAL EVERY 6 HOURS PRN
Qty: 30 TABLET | Refills: 0 | Status: SHIPPED | OUTPATIENT
Start: 2021-07-28 | End: 2021-12-17

## 2021-07-29 ENCOUNTER — TELEPHONE (OUTPATIENT)
Dept: FAMILY MEDICINE CLINIC | Facility: CLINIC | Age: 71
End: 2021-07-29

## 2021-07-29 ENCOUNTER — LAB (OUTPATIENT)
Dept: FAMILY MEDICINE CLINIC | Facility: CLINIC | Age: 71
End: 2021-07-29

## 2021-07-29 DIAGNOSIS — R30.0 DYSURIA: Primary | ICD-10-CM

## 2021-07-29 RX ORDER — SULFAMETHOXAZOLE AND TRIMETHOPRIM 800; 160 MG/1; MG/1
1 TABLET ORAL 2 TIMES DAILY
Qty: 10 TABLET | Refills: 0 | Status: SHIPPED | OUTPATIENT
Start: 2021-07-29 | End: 2021-08-02

## 2021-07-29 NOTE — TELEPHONE ENCOUNTER
Will come by today for urine sample tm  ----- Message from Stan Alvarado MD sent at 7/29/2021 10:34 AM EDT -----  Emailed script for bactrim  If she is able to leave a urine for culture before she starts on the bactrim it may be helpful  ----- Message -----  From: Serena Velasquez RegSched Rep  Sent: 7/29/2021  10:19 AM EDT  To: Stan Alvarado MD    Complains of uti/bladder infection  requesting an antibiotic

## 2021-08-01 LAB
BACTERIA UR CULT: ABNORMAL
BACTERIA UR CULT: ABNORMAL
OTHER ANTIBIOTIC SUSC ISLT: ABNORMAL

## 2021-08-02 RX ORDER — FLUCONAZOLE 150 MG/1
150 TABLET ORAL DAILY PRN
Qty: 3 TABLET | Refills: 0 | Status: SHIPPED | OUTPATIENT
Start: 2021-08-02 | End: 2021-12-17 | Stop reason: SDUPTHER

## 2021-08-02 RX ORDER — CIPROFLOXACIN 250 MG/1
250 TABLET, FILM COATED ORAL 2 TIMES DAILY
Qty: 20 TABLET | Refills: 0 | Status: SHIPPED | OUTPATIENT
Start: 2021-08-02 | End: 2021-08-12

## 2021-09-16 ENCOUNTER — OFFICE VISIT (OUTPATIENT)
Dept: FAMILY MEDICINE CLINIC | Facility: CLINIC | Age: 71
End: 2021-09-16

## 2021-09-16 VITALS
DIASTOLIC BLOOD PRESSURE: 76 MMHG | HEIGHT: 61 IN | TEMPERATURE: 96.2 F | HEART RATE: 77 BPM | SYSTOLIC BLOOD PRESSURE: 126 MMHG | RESPIRATION RATE: 14 BRPM | OXYGEN SATURATION: 96 % | WEIGHT: 103 LBS | BODY MASS INDEX: 19.45 KG/M2

## 2021-09-16 DIAGNOSIS — Z87.440 H/O RECURRENT URINARY TRACT INFECTION: ICD-10-CM

## 2021-09-16 DIAGNOSIS — I10 ESSENTIAL HYPERTENSION: ICD-10-CM

## 2021-09-16 DIAGNOSIS — N32.89 BLADDER INSTABILITY: ICD-10-CM

## 2021-09-16 DIAGNOSIS — G43.009 MIGRAINE WITHOUT AURA AND WITHOUT STATUS MIGRAINOSUS, NOT INTRACTABLE: ICD-10-CM

## 2021-09-16 DIAGNOSIS — M81.0 AGE-RELATED OSTEOPOROSIS WITHOUT CURRENT PATHOLOGICAL FRACTURE: ICD-10-CM

## 2021-09-16 DIAGNOSIS — R73.03 PREDIABETES: ICD-10-CM

## 2021-09-16 DIAGNOSIS — R79.89 ELEVATED TSH: ICD-10-CM

## 2021-09-16 DIAGNOSIS — E04.9 GOITER: ICD-10-CM

## 2021-09-16 DIAGNOSIS — K58.8 OTHER IRRITABLE BOWEL SYNDROME: ICD-10-CM

## 2021-09-16 DIAGNOSIS — Z00.00 HEALTHCARE MAINTENANCE: ICD-10-CM

## 2021-09-16 DIAGNOSIS — Z23 ENCOUNTER FOR IMMUNIZATION: ICD-10-CM

## 2021-09-16 DIAGNOSIS — N81.89 PELVIC RELAXATION: ICD-10-CM

## 2021-09-16 DIAGNOSIS — J44.9 CHRONIC OBSTRUCTIVE PULMONARY DISEASE, UNSPECIFIED COPD TYPE (HCC): ICD-10-CM

## 2021-09-16 DIAGNOSIS — E78.2 MIXED HYPERLIPIDEMIA: Primary | ICD-10-CM

## 2021-09-16 DIAGNOSIS — N39.0 RECURRENT UTI: ICD-10-CM

## 2021-09-16 DIAGNOSIS — J44.9 COPD MIXED TYPE (HCC): ICD-10-CM

## 2021-09-16 DIAGNOSIS — K21.9 GASTROESOPHAGEAL REFLUX DISEASE WITHOUT ESOPHAGITIS: ICD-10-CM

## 2021-09-16 DIAGNOSIS — M15.9 PRIMARY OSTEOARTHRITIS INVOLVING MULTIPLE JOINTS: ICD-10-CM

## 2021-09-16 DIAGNOSIS — I25.10 CORONARY ARTERY CALCIFICATION SEEN ON CT SCAN: ICD-10-CM

## 2021-09-16 DIAGNOSIS — F17.200 SMOKER: ICD-10-CM

## 2021-09-16 PROCEDURE — 99214 OFFICE O/P EST MOD 30 MIN: CPT | Performed by: GENERAL PRACTICE

## 2021-09-16 PROCEDURE — 90686 IIV4 VACC NO PRSV 0.5 ML IM: CPT | Performed by: GENERAL PRACTICE

## 2021-09-16 PROCEDURE — G0008 ADMIN INFLUENZA VIRUS VAC: HCPCS | Performed by: GENERAL PRACTICE

## 2021-09-16 RX ORDER — LANOLIN ALCOHOL/MO/W.PET/CERES
1000 CREAM (GRAM) TOPICAL DAILY
Qty: 30 TABLET | Refills: 5 | Status: SHIPPED | OUTPATIENT
Start: 2021-09-16 | End: 2021-12-17 | Stop reason: SDUPTHER

## 2021-09-16 RX ORDER — SUMATRIPTAN 100 MG/1
TABLET, FILM COATED ORAL
Qty: 9 TABLET | Refills: 5 | Status: SHIPPED | OUTPATIENT
Start: 2021-09-16 | End: 2021-12-17 | Stop reason: SDUPTHER

## 2021-09-16 RX ORDER — ASPIRIN 81 MG/1
81 TABLET ORAL DAILY
Qty: 30 TABLET | Refills: 5 | Status: SHIPPED | OUTPATIENT
Start: 2021-09-16 | End: 2021-12-17 | Stop reason: SDUPTHER

## 2021-09-16 RX ORDER — DILTIAZEM HYDROCHLORIDE 360 MG/1
360 CAPSULE, EXTENDED RELEASE ORAL DAILY
Qty: 30 CAPSULE | Refills: 5 | Status: SHIPPED | OUTPATIENT
Start: 2021-09-16 | End: 2021-12-17 | Stop reason: SDUPTHER

## 2021-09-16 RX ORDER — SPIRONOLACTONE 25 MG/1
25 TABLET ORAL EVERY MORNING
Qty: 30 TABLET | Refills: 5 | Status: SHIPPED | OUTPATIENT
Start: 2021-09-16 | End: 2021-12-17 | Stop reason: SDUPTHER

## 2021-09-16 RX ORDER — IPRATROPIUM BROMIDE AND ALBUTEROL SULFATE 2.5; .5 MG/3ML; MG/3ML
3 SOLUTION RESPIRATORY (INHALATION) 4 TIMES DAILY PRN
Qty: 540 ML | Refills: 5 | Status: SHIPPED | OUTPATIENT
Start: 2021-09-16 | End: 2021-12-17 | Stop reason: SDUPTHER

## 2021-09-16 RX ORDER — MONTELUKAST SODIUM 10 MG/1
10 TABLET ORAL NIGHTLY
Qty: 30 TABLET | Refills: 5 | Status: SHIPPED | OUTPATIENT
Start: 2021-09-16 | End: 2021-12-17 | Stop reason: SDUPTHER

## 2021-09-16 RX ORDER — ESTRADIOL 0.1 MG/G
2 CREAM VAGINAL 2 TIMES WEEKLY
Qty: 20 G | Refills: 5 | Status: SHIPPED | OUTPATIENT
Start: 2021-09-16 | End: 2021-12-17 | Stop reason: SDUPTHER

## 2021-09-16 RX ORDER — DENOSUMAB 60 MG/ML
60 INJECTION SUBCUTANEOUS
Qty: 1 ML | Refills: 1 | Status: SHIPPED | OUTPATIENT
Start: 2021-09-16 | End: 2021-09-24 | Stop reason: SDUPTHER

## 2021-09-16 RX ORDER — ATORVASTATIN CALCIUM 40 MG/1
40 TABLET, FILM COATED ORAL
Qty: 30 TABLET | Refills: 5 | Status: SHIPPED | OUTPATIENT
Start: 2021-09-16 | End: 2021-12-17 | Stop reason: SDUPTHER

## 2021-09-16 RX ORDER — PANTOPRAZOLE SODIUM 40 MG/1
40 TABLET, DELAYED RELEASE ORAL 2 TIMES DAILY
Qty: 60 TABLET | Refills: 5 | Status: SHIPPED | OUTPATIENT
Start: 2021-09-16 | End: 2021-12-17 | Stop reason: SDUPTHER

## 2021-09-16 NOTE — PROGRESS NOTES
Subjective   Albino Estrella is a 70 y.o. female.     Chief Complaint  She returns for a scheduled reassessment of multiple medical problems including COPD, GERD, hypertension, hyperlipidemia, and prediabetes    History of Present Illness     COPD  She has a history of intermittent SOB, cough and wheezing.  She continues to deny any chest pain or hemoptysis. She remains on trelegy and feels that it has helped considerably. She continues to use nebulized albuterol 4 times daily. There is no history of any fever, chills, or night sweats.  She continues to smoke 1 pack per day. Low dose CT of the chest performed on 4/2/18 was reported as showing a 15 mm RLL pulmonary nodule, COPD, mild coronary artery vascular calcifications, and a cyst of the left upper kidney. PET scan performed on 4/27/18 revealed no hypermetabolism of the lesion noted on CT but a diffusely enlarged and hypermetabolic thyroid. U/S of the kidneys performed the same day was unremarkable. Follow up CT of the chest was performed on 9/25/18 and revealed a decrease in the RLL pulmonary nodule to 13 mm.  Her most recent study performed on 11/30/2020 was stable and a 1 year follow-up was recommended  Lab Results   Component Value Date    WBC 12.51 (H) 02/09/2021    HGB 13.9 02/09/2021    HCT 41.3 02/09/2021    MCV 94.9 02/09/2021     02/09/2021     Hypertension  Home blood pressure readings: not doing. Associated signs and symptoms: dyspnea.  She continues to deny any chest pain, palpitations, orthopnea, paroxysmal nocturnal dyspnea or peripheral edema. Current antihypertensive medications includes diltiazem and spironolactone.   Lab Results   Component Value Date    GLUCOSE 101 01/12/2017    BUN 13 02/09/2021    CREATININE 0.73 02/09/2021    EGFRIFNONA 79 02/09/2021    EGFRIFAFRI 96 02/09/2021    BCR 17.8 02/09/2021    K 4.3 02/09/2021    CO2 26.6 02/09/2021    CALCIUM 9.9 02/09/2021    PROTENTOTREF 7.9 02/09/2021    ALBUMIN 4.80 02/09/2021    LABIL2  1.5 02/09/2021    AST 16 02/09/2021    ALT 13 02/09/2021     Dyslipidemia  Compliance with treatment has been fair. The patient exercises intermittently. She remains on atorvastatin with no apparent side effects  Lab Results   Component Value Date    CHOL 197 01/12/2017    CHLPL 145 02/09/2021    TRIG 90 02/09/2021    HDL 66 (H) 02/09/2021    LDL 62 02/09/2021     Gastroesophageal Reflux Disease  She remains heartburn free on pantoprazole.  She denies any difficulty swallowing, regurgitation, vomiting, abdominal pain, change in her bowel habits, hematochezia, or melena.    Pelvic Pressure  She continues to experience pelvic pressure with prolonged standing, pushing, pulling, or lifting. This is associated with urinary frequency, urgency, dysuria, and occasional incontinence. She continues to deny any hematuria, vaginal bleeding or discharge, change in her bowel habits, hematochezia, melena, fever or chills. She has a history of recurrent urinary tract infections.  She was tried on tolterodine but stopped it due to a lack of effect and increased fatigue.  Underwent a gynecology assessment with no significant abnormalities found on history or physical exam.  Offered a cystoscopy but decided to defer this for now.    Labs  Most recent vitamin D 38.6  Lab Results   Component Value Date    HGBA1C 5.40 02/09/2021     Lab Results   Component Value Date    TSH 8.980 (H) 02/09/2021     Imaging  Mammogram performed on 6/15/2021 was stable.  Ultrasound of the thyroid performed on 5/20/2021 revealed diffuse enlargement of the gland with inhomogenous echogenicity consistent with a goiter    The following portions of the patient's history were reviewed and updated as appropriate: allergies, current medications, past medical history, past social history and problem list.    Review of Systems   Constitutional: Positive for fatigue. Negative for appetite change, chills, fever and unexpected weight change.   HENT: Positive for  congestion and rhinorrhea. Negative for ear pain, postnasal drip, sinus pressure, sneezing, sore throat and voice change.    Eyes: Negative for visual disturbance.   Respiratory: Positive for cough, shortness of breath and wheezing. Negative for stridor.    Cardiovascular: Negative for chest pain, palpitations and leg swelling.   Gastrointestinal: Negative for abdominal pain, blood in stool, constipation, diarrhea, nausea and vomiting.   Genitourinary: Positive for dysuria (intermittent), frequency, pelvic pain (pressure) and urgency. Negative for difficulty urinating, hematuria and vaginal discharge.   Musculoskeletal: Negative for arthralgias, back pain and myalgias.   Skin: Negative for rash.   Neurological: Positive for dizziness (chronic-intermittent - responds to meclizine) and headaches (chronic - intermittent - stable). Negative for weakness and numbness.   Psychiatric/Behavioral: Negative for dysphoric mood and sleep disturbance. The patient is not nervous/anxious.      Objective   Physical Exam  Constitutional:       General: She is not in acute distress.     Appearance: Normal appearance. She is well-developed. She is not diaphoretic.      Comments: Accompanied by her . Bright and in fair spirits. No apparent distress. No pallor, jaundice, diaphoresis, or cyanosis.   HENT:      Head: Atraumatic.      Right Ear: Tympanic membrane, ear canal and external ear normal.      Left Ear: Tympanic membrane, ear canal and external ear normal.   Eyes:      Conjunctiva/sclera: Conjunctivae normal.   Neck:      Thyroid: Thyromegaly (right lobe more prominent then the left) present. No thyroid mass.      Vascular: No carotid bruit or JVD.      Trachea: Trachea normal. No tracheal deviation.   Cardiovascular:      Rate and Rhythm: Normal rate and regular rhythm.      Heart sounds: Normal heart sounds, S1 normal and S2 normal. No murmur heard.   No gallop.    Pulmonary:      Effort: Pulmonary effort is normal.       Breath sounds: Examination of the right-lower field reveals decreased breath sounds. Examination of the left-lower field reveals decreased breath sounds. Decreased breath sounds and wheezing (diffuse - mild) present. No rales.      Comments: Pulmonary hyperinflation  Abdominal:      General: Bowel sounds are normal. There is no distension.   Musculoskeletal:      Right lower leg: No edema.      Left lower leg: No edema.   Lymphadenopathy:      Head:      Right side of head: No submental, submandibular, tonsillar, preauricular, posterior auricular or occipital adenopathy.      Left side of head: No submental, submandibular, tonsillar, preauricular, posterior auricular or occipital adenopathy.      Cervical: No cervical adenopathy.      Upper Body:      Right upper body: No supraclavicular adenopathy.      Left upper body: No supraclavicular adenopathy.   Skin:     General: Skin is warm.      Coloration: Skin is not cyanotic, jaundiced or pale.      Findings: No rash.      Nails: There is no clubbing.   Neurological:      Mental Status: She is alert and oriented to person, place, and time.      Cranial Nerves: No cranial nerve deficit.      Motor: No tremor.      Coordination: Coordination normal.      Gait: Gait normal.   Psychiatric:         Attention and Perception: Attention normal.         Mood and Affect: Mood normal.         Speech: Speech normal.         Behavior: Behavior normal.         Thought Content: Thought content normal.        Assessment/Plan   Problems Addressed this Visit        Cardiac and Vasculature    Coronary artery calcification seen on CT scan  Reminded regarding risk factor modification with an emphasis on tobacco cessation.  Continue ASA    Relevant Medications    dilTIAZem (TIAZAC) 360 MG 24 hr capsule    Essential hypertension   Hypertension: at goal. Evidence of target organ damage: coronary artery calcifications on previous imaging.  Encouraged to continue to work on diet and exercise  plan.   Continue current medication    Relevant Medications    aspirin 81 MG EC tablet    dilTIAZem (TIAZAC) 360 MG 24 hr capsule    spironolactone (ALDACTONE) 25 MG tablet    Mixed hyperlipidemia  As above.   Continue current medication.    Relevant Medications    atorvastatin (LIPITOR) 40 MG tablet       Endocrine and Metabolic    Goiter    Prediabetes  As above.  Will continue to monitor       Gastrointestinal Abdominal     Gastroesophageal reflux disease without esophagitis   Symptoms are currently well controlled.  Reminded regarding lifestyle modification.  Continue current medication.    Relevant Medications    pantoprazole (PROTONIX) 40 MG EC tablet    IBS (irritable bowel syndrome)       Genitourinary and Reproductive     Bladder instability    H/O recurrent urinary tract infection    Relevant Medications    estradiol (ESTRACE) 0.1 MG/GM vaginal cream    Pelvic relaxation       Health Encounters    Healthcare maintenance  Recommended a flu shot  We will discuss an updated low-dose CT of the chest at her return    Relevant Medications    vitamin B-12 (CYANOCOBALAMIN) 1000 MCG tablet    Other Relevant Orders    FluLaval >6 Months (6100-7554) (Completed)       Musculoskeletal and Injuries    Age-related osteoporosis without current pathological fracture  Encouraged to continue to pursue weight bearing activities while exercising joint protection.  Continue current medication    Relevant Medications    denosumab (Prolia) 60 MG/ML solution prefilled syringe syringe    Primary osteoarthritis       Neuro    Migraine without aura and without status migrainosus, not intractable    Relevant Medications    aspirin 81 MG EC tablet    dilTIAZem (TIAZAC) 360 MG 24 hr capsule    SUMAtriptan (IMITREX) 100 MG tablet       Pulmonary and Pneumonias    COPD mixed type (CMS/HCC)   COPD is stable.  Reminded of the importance of smoking cessation  Encouraged to remain as active as symptoms allow for  Continue current medication     Relevant Medications    Fluticasone-Umeclidin-Vilant (Trelegy Ellipta) 100-62.5-25 MCG/INH inhaler    ipratropium-albuterol (DUO-NEB) 0.5-2.5 mg/3 ml nebulizer    montelukast (SINGULAIR) 10 MG tablet       Symptoms and Signs    Elevated TSH  Clinically euthyroid.  Will continue to monitor       Tobacco    Smoker          Diagnoses       Codes Comments    Mixed hyperlipidemia    -  Primary ICD-10-CM: E78.2  ICD-9-CM: 272.2     Essential hypertension     ICD-10-CM: I10  ICD-9-CM: 401.9     Coronary artery calcification seen on CT scan     ICD-10-CM: I25.10  ICD-9-CM: 414.00     Prediabetes     ICD-10-CM: R73.03  ICD-9-CM: 790.29     Goiter     ICD-10-CM: E04.9  ICD-9-CM: 240.9     Other irritable bowel syndrome     ICD-10-CM: K58.8  ICD-9-CM: 564.1     Gastroesophageal reflux disease without esophagitis     ICD-10-CM: K21.9  ICD-9-CM: 530.81     Pelvic relaxation     ICD-10-CM: N81.89  ICD-9-CM: 618.89     H/O recurrent urinary tract infection     ICD-10-CM: Z87.440  ICD-9-CM: V13.02     Bladder instability     ICD-10-CM: N32.89  ICD-9-CM: 596.89     Healthcare maintenance     ICD-10-CM: Z00.00  ICD-9-CM: V70.0     Primary osteoarthritis involving multiple joints     ICD-10-CM: M89.49  ICD-9-CM: 715.98     Age-related osteoporosis without current pathological fracture     ICD-10-CM: M81.0  ICD-9-CM: 733.01     Migraine without aura and without status migrainosus, not intractable     ICD-10-CM: G43.009  ICD-9-CM: 346.10     COPD mixed type (CMS/HCC)     ICD-10-CM: J44.9  ICD-9-CM: 496     Elevated TSH     ICD-10-CM: R79.89  ICD-9-CM: 794.5     Smoker     ICD-10-CM: F17.200  ICD-9-CM: 305.1     Recurrent UTI     ICD-10-CM: N39.0  ICD-9-CM: 599.0     Chronic obstructive pulmonary disease, unspecified COPD type (CMS/HCC)     ICD-10-CM: J44.9  ICD-9-CM: 496     Encounter for immunization     ICD-10-CM: Z23  ICD-9-CM: V03.89

## 2021-09-24 ENCOUNTER — CLINICAL SUPPORT (OUTPATIENT)
Dept: FAMILY MEDICINE CLINIC | Facility: CLINIC | Age: 71
End: 2021-09-24

## 2021-09-24 DIAGNOSIS — M81.0 OSTEOPOROSIS, UNSPECIFIED OSTEOPOROSIS TYPE, UNSPECIFIED PATHOLOGICAL FRACTURE PRESENCE: Primary | ICD-10-CM

## 2021-09-24 PROCEDURE — 96372 THER/PROPH/DIAG INJ SC/IM: CPT | Performed by: GENERAL PRACTICE

## 2021-11-14 DIAGNOSIS — J44.9 COPD MIXED TYPE (HCC): ICD-10-CM

## 2021-12-17 ENCOUNTER — OFFICE VISIT (OUTPATIENT)
Dept: FAMILY MEDICINE CLINIC | Facility: CLINIC | Age: 71
End: 2021-12-17

## 2021-12-17 DIAGNOSIS — K58.8 OTHER IRRITABLE BOWEL SYNDROME: ICD-10-CM

## 2021-12-17 DIAGNOSIS — Z00.00 HEALTHCARE MAINTENANCE: ICD-10-CM

## 2021-12-17 DIAGNOSIS — E04.9 GOITER: ICD-10-CM

## 2021-12-17 DIAGNOSIS — F17.200 SMOKER: ICD-10-CM

## 2021-12-17 DIAGNOSIS — R42 VERTIGO: ICD-10-CM

## 2021-12-17 DIAGNOSIS — K21.9 GASTROESOPHAGEAL REFLUX DISEASE WITHOUT ESOPHAGITIS: ICD-10-CM

## 2021-12-17 DIAGNOSIS — Z87.440 H/O RECURRENT URINARY TRACT INFECTION: ICD-10-CM

## 2021-12-17 DIAGNOSIS — I10 ESSENTIAL HYPERTENSION: ICD-10-CM

## 2021-12-17 DIAGNOSIS — R79.89 ELEVATED TSH: ICD-10-CM

## 2021-12-17 DIAGNOSIS — N39.0 RECURRENT UTI: ICD-10-CM

## 2021-12-17 DIAGNOSIS — I25.10 CORONARY ARTERY CALCIFICATION SEEN ON CT SCAN: Primary | ICD-10-CM

## 2021-12-17 DIAGNOSIS — E78.2 MIXED HYPERLIPIDEMIA: ICD-10-CM

## 2021-12-17 DIAGNOSIS — N81.89 PELVIC RELAXATION: ICD-10-CM

## 2021-12-17 DIAGNOSIS — G43.009 MIGRAINE WITHOUT AURA AND WITHOUT STATUS MIGRAINOSUS, NOT INTRACTABLE: ICD-10-CM

## 2021-12-17 DIAGNOSIS — J44.9 COPD MIXED TYPE (HCC): ICD-10-CM

## 2021-12-17 DIAGNOSIS — J44.9 CHRONIC OBSTRUCTIVE PULMONARY DISEASE, UNSPECIFIED COPD TYPE (HCC): ICD-10-CM

## 2021-12-17 DIAGNOSIS — R73.03 PREDIABETES: ICD-10-CM

## 2021-12-17 DIAGNOSIS — N32.89 BLADDER INSTABILITY: ICD-10-CM

## 2021-12-17 DIAGNOSIS — M15.9 PRIMARY OSTEOARTHRITIS INVOLVING MULTIPLE JOINTS: ICD-10-CM

## 2021-12-17 DIAGNOSIS — Z87.891 PERSONAL HISTORY OF NICOTINE DEPENDENCE: ICD-10-CM

## 2021-12-17 DIAGNOSIS — M81.0 AGE-RELATED OSTEOPOROSIS WITHOUT CURRENT PATHOLOGICAL FRACTURE: ICD-10-CM

## 2021-12-17 LAB
BILIRUB BLD-MCNC: NEGATIVE MG/DL
CLARITY, POC: CLEAR
COLOR UR: YELLOW
EXPIRATION DATE: NORMAL
GLUCOSE UR STRIP-MCNC: NEGATIVE MG/DL
KETONES UR QL: NEGATIVE
LEUKOCYTE EST, POC: NEGATIVE
Lab: NORMAL
NITRITE UR-MCNC: NEGATIVE MG/ML
PH UR: 6 [PH] (ref 5–8)
PROT UR STRIP-MCNC: NEGATIVE MG/DL
RBC # UR STRIP: NEGATIVE /UL
SP GR UR: 1.01 (ref 1–1.03)
UROBILINOGEN UR QL: NORMAL

## 2021-12-17 PROCEDURE — 1125F AMNT PAIN NOTED PAIN PRSNT: CPT | Performed by: GENERAL PRACTICE

## 2021-12-17 PROCEDURE — 96160 PT-FOCUSED HLTH RISK ASSMT: CPT | Performed by: GENERAL PRACTICE

## 2021-12-17 PROCEDURE — 81003 URINALYSIS AUTO W/O SCOPE: CPT | Performed by: GENERAL PRACTICE

## 2021-12-17 PROCEDURE — 1170F FXNL STATUS ASSESSED: CPT | Performed by: GENERAL PRACTICE

## 2021-12-17 PROCEDURE — 1159F MED LIST DOCD IN RCRD: CPT | Performed by: GENERAL PRACTICE

## 2021-12-17 PROCEDURE — G0439 PPPS, SUBSEQ VISIT: HCPCS | Performed by: GENERAL PRACTICE

## 2021-12-17 RX ORDER — ASPIRIN 81 MG/1
81 TABLET ORAL DAILY
Qty: 30 TABLET | Refills: 5 | Status: SHIPPED | OUTPATIENT
Start: 2021-12-17 | End: 2022-09-28 | Stop reason: SDUPTHER

## 2021-12-17 RX ORDER — MONTELUKAST SODIUM 10 MG/1
10 TABLET ORAL NIGHTLY
Qty: 30 TABLET | Refills: 5 | Status: SHIPPED | OUTPATIENT
Start: 2021-12-17 | End: 2022-03-28

## 2021-12-17 RX ORDER — SUMATRIPTAN 100 MG/1
TABLET, FILM COATED ORAL
Qty: 9 TABLET | Refills: 5 | Status: SHIPPED | OUTPATIENT
Start: 2021-12-17 | End: 2022-09-28 | Stop reason: SDUPTHER

## 2021-12-17 RX ORDER — FLUCONAZOLE 150 MG/1
150 TABLET ORAL DAILY PRN
Qty: 3 TABLET | Refills: 0 | Status: SHIPPED | OUTPATIENT
Start: 2021-12-17 | End: 2022-09-27 | Stop reason: SDUPTHER

## 2021-12-17 RX ORDER — LANOLIN ALCOHOL/MO/W.PET/CERES
1000 CREAM (GRAM) TOPICAL DAILY
Qty: 30 TABLET | Refills: 5 | Status: SHIPPED | OUTPATIENT
Start: 2021-12-17 | End: 2022-09-28 | Stop reason: SDUPTHER

## 2021-12-17 RX ORDER — ATORVASTATIN CALCIUM 40 MG/1
40 TABLET, FILM COATED ORAL
Qty: 30 TABLET | Refills: 5 | Status: SHIPPED | OUTPATIENT
Start: 2021-12-17 | End: 2022-03-28

## 2021-12-17 RX ORDER — MECLIZINE HYDROCHLORIDE 25 MG/1
25 TABLET ORAL 3 TIMES DAILY PRN
Qty: 90 TABLET | Refills: 5 | Status: SHIPPED | OUTPATIENT
Start: 2021-12-17 | End: 2022-09-28 | Stop reason: SDUPTHER

## 2021-12-17 RX ORDER — IPRATROPIUM BROMIDE AND ALBUTEROL SULFATE 2.5; .5 MG/3ML; MG/3ML
3 SOLUTION RESPIRATORY (INHALATION) 4 TIMES DAILY PRN
Qty: 540 ML | Refills: 5 | Status: SHIPPED | OUTPATIENT
Start: 2021-12-17 | End: 2022-09-28 | Stop reason: SDUPTHER

## 2021-12-17 RX ORDER — ESTRADIOL 0.1 MG/G
2 CREAM VAGINAL 2 TIMES WEEKLY
Qty: 20 G | Refills: 5 | Status: SHIPPED | OUTPATIENT
Start: 2021-12-20 | End: 2022-09-28 | Stop reason: SDUPTHER

## 2021-12-17 RX ORDER — PANTOPRAZOLE SODIUM 40 MG/1
40 TABLET, DELAYED RELEASE ORAL 2 TIMES DAILY
Qty: 60 TABLET | Refills: 5 | Status: SHIPPED | OUTPATIENT
Start: 2021-12-17 | End: 2022-03-28

## 2021-12-17 RX ORDER — SPIRONOLACTONE 25 MG/1
25 TABLET ORAL EVERY MORNING
Qty: 30 TABLET | Refills: 5 | Status: SHIPPED | OUTPATIENT
Start: 2021-12-17 | End: 2022-03-28

## 2021-12-17 RX ORDER — DILTIAZEM HYDROCHLORIDE 360 MG/1
360 CAPSULE, EXTENDED RELEASE ORAL DAILY
Qty: 30 CAPSULE | Refills: 5 | Status: SHIPPED | OUTPATIENT
Start: 2021-12-17 | End: 2022-09-28 | Stop reason: SDUPTHER

## 2021-12-17 NOTE — PATIENT INSTRUCTIONS
Advance Directive    Advance directives are legal documents that let you make choices ahead of time about your health care and medical treatment in case you become unable to communicate for yourself. Advance directives are a way for you to make known your wishes to family, friends, and health care providers. This can let others know about your end-of-life care if you become unable to communicate.  Discussing and writing advance directives should happen over time rather than all at once. Advance directives can be changed depending on your situation and what you want, even after you have signed the advance directives.  There are different types of advance directives, such as:  · Medical power of .  · Living will.  · Do not resuscitate (DNR) or do not attempt resuscitation (DNAR) order.  Health care proxy and medical power of   A health care proxy is also called a health care agent. This is a person who is appointed to make medical decisions for you in cases where you are unable to make the decisions yourself. Generally, people choose someone they know well and trust to represent their preferences. Make sure to ask this person for an agreement to act as your proxy. A proxy may have to exercise judgment in the event of a medical decision for which your wishes are not known.  A medical power of  is a legal document that names your health care proxy. Depending on the laws in your state, after the document is written, it may also need to be:  · Signed.  · Notarized.  · Dated.  · Copied.  · Witnessed.  · Incorporated into your medical record.  You may also want to appoint someone to manage your money in a situation in which you are unable to do so. This is called a durable power of  for finances. It is a separate legal document from the durable power of  for health care. You may choose the same person or someone different from your health care proxy to act as your agent in money  matters.  If you do not appoint a proxy, or if there is a concern that the proxy is not acting in your best interests, a court may appoint a guardian to act on your behalf.  Living will  A living will is a set of instructions that state your wishes about medical care when you cannot express them yourself. Health care providers should keep a copy of your living will in your medical record. You may want to give a copy to family members or friends. To alert caregivers in case of an emergency, you can place a card in your wallet to let them know that you have a living will and where they can find it. A living will is used if you become:  · Terminally ill.  · Disabled.  · Unable to communicate or make decisions.  Items to consider in your living will include:  · To use or not to use life-support equipment, such as dialysis machines and breathing machines (ventilators).  · A DNR or DNAR order. This tells health care providers not to use cardiopulmonary resuscitation (CPR) if breathing or heartbeat stops.  · To use or not to use tube feeding.  · To be given or not to be given food and fluids.  · Comfort (palliative) care when the goal becomes comfort rather than a cure.  · Donation of organs and tissues.  A living will does not give instructions for distributing your money and property if you should pass away.  DNR or DNAR  A DNR or DNAR order is a request not to have CPR in the event that your heart stops beating or you stop breathing. If a DNR or DNAR order has not been made and shared, a health care provider will try to help any patient whose heart has stopped or who has stopped breathing. If you plan to have surgery, talk with your health care provider about how your DNR or DNAR order will be followed if problems occur.  What if I do not have an advance directive?  If you do not have an advance directive, some states assign family decision makers to act on your behalf based on how closely you are related to them. Each  state has its own laws about advance directives. You may want to check with your health care provider, , or state representative about the laws in your state.  Summary  · Advance directives are the legal documents that allow you to make choices ahead of time about your health care and medical treatment in case you become unable to tell others about your care.  · The process of discussing and writing advance directives should happen over time. You can change the advance directives, even after you have signed them.  · Advance directives include DNR or DNAR orders, living [place], and designating an agent as your medical power of .  This information is not intended to replace advice given to you by your health care provider. Make sure you discuss any questions you have with your health care provider.  Document Revised: 01/28/2021 Document Reviewed: 07/16/2020  [employer] Patient Education © 2021 [employer]      Heart Disease Prevention  Heart disease is the leading cause of death in the world. Coronary artery disease is the most common cause of heart disease. This condition results when cholesterol and other substances (plaque) build up inside the walls of the blood vessels that supply your heart muscle (arteries). This buildup in arteries is called atherosclerosis. You can take actions to lower your risk of heart disease.  How can heart disease affect me?  Heart disease can cause many unpleasant symptoms and complications, such as:  · Chest pain (angina).  · Reduced or blocked blood flow to your heart. This can cause:  ? Irregular heartbeats (arrhythmias).  ? Heart attack.  ? Heart failure.  What can increase my risk?  The following factors may make you more likely to develop this condition:  · High blood pressure (hypertension).  · High cholesterol.  · Smoking.  · A diet high in saturated fats or trans fats.  · Lack of physical activity.  · Obesity.  · Drinking too much alcohol.  · Diabetes.  · Having  a family history of heart disease.  What actions can I take to prevent heart disease?  Nutrition    · Eat a heart-healthy eating plan as told by your health care provider. Examples include the DASH (Dietary Approaches to Stop Hypertension) eating plan or the Mediterranean diet.  · Generally, it is recommended that you:  ? Eat less salt (sodium). Ask your health care provider how much sodium is safe for you. Most people should have less than 2,300 mg each day.  ? Limit unhealthy fats, such as saturated and trans fats, in your diet. You can do this by eating low-fat dairy products, eating less red meat, and avoiding processed foods.  ? Eat healthy fats (omega-3 fatty acids). These are found in fish, such as mackerel or salmon.  ? Eat more fruits and vegetables. You should try to fill one-half of your plate with fruits and vegetables at each meal.  ? Eat more whole grains.  ? Avoid foods and drinks that have added sugars.    Lifestyle    · Get regular exercise. This is one of the most important things you can do for your health. Generally, it is recommended that you:  ? Exercise for at least 30 minutes on most days of the week (150 minutes each week). The exercise should increase your heart rate and make you sweat (aerobic exercise).  ? Add strength exercises on at least 2 days each week.  · Do not use any products that contain nicotine or tobacco, such as cigarettes and e-cigarettes. These can damage your heart and blood vessels. If you need help quitting, ask your health care provider.    Alcohol use  · Do not drink alcohol if:  ? Your health care provider tells you not to drink.  ? You are pregnant, may be pregnant, or are planning to become pregnant.  · If you drink alcohol, limit how much you have:  ? 0-1 drink a day for women.  ? 0-2 drinks a day for men.  · Be aware of how much alcohol is in your drink. In the [place] one drink equals one typical bottle of beer (12 oz), one-half glass of wine (5 oz), or one shot  of hard liquor (1½ oz).  Medicines  · Take over-the-counter and prescription medicines only as told by your health care provider.  · Ask your health care provider whether you should take an aspirin every day. Taking aspirin may help reduce your risk of heart disease and stroke.  · Depending on your risk factors, your health care provider may prescribe medicines to lower your risk of heart disease or to control related conditions. You may take medicine to:  ? Lower cholesterol.  ? Control blood pressure.  ? Control diabetes.  General information  · Keep your blood pressure under control, as recommended by your health care provider. For most healthy people, the upper number of your blood pressure (systolic) should be no higher than 120, and the lower number (diastolic) no higher than 80. Treatment may be needed if your blood pressure is higher than 130/80.  · Have your blood pressure checked at least every two years. Your health care provider may check your blood pressure more often if you have high blood pressure.  · After age 20, have your cholesterol checked every 4-6 years. If you have risk factors for heart disease, you may need to have it checked more frequently. Treatment may be needed if your cholesterol is high.  · Have your body mass index (BMI) checked every year. Your health care provider can calculate your BMI from your height and weight.  · Work with your health care provider to lose weight, if needed, or to maintain a healthy weight.  Where to find more information:  · Centers for Disease Control and Prevention: www.cdc.gov/heartdisease  · American Heart Association: www.heart.org  ? Take a free online heart disease risk quiz to better understand your personal risk factors.  Summary  · Heart disease is the leading cause of death in the world.  · Heart disease can cause chest pain, abnormal heart rhythms, heart attack, and heart failure.  · High blood pressure, high cholesterol, and smoking are the main  risk factors for heart disease, although other factors also contribute.  · You can take actions to lower your chances of developing heart disease. Work with your health care provider to reduce your risk by following a heart-healthy diet, being physically active, and controlling your weight, blood pressure, and cholesterol level.  This information is not intended to replace advice given to you by your health care provider. Make sure you discuss any questions you have with your health care provider.  Document Revised: 01/02/2019 Document Reviewed: 01/02/2019  [employer] Patient Education © 2021 [employer]      Lung Cancer Screening  A lung cancer screening is a test that checks for lung cancer. Lung cancer screening is done to look for lung cancer in its very early stages when you are not likely to have any symptoms and before it spreads beyond the lung, making it harder to treat. Finding cancer early improves the chances of successful treatment. It may save your life.  Who should have screening?  You should be screened for lung cancer if all of these apply:  · You currently smoke or you have quit smoking within the past 15 years.  · You are 50-80 years old. Screening may be recommended up to age 80 depending on your overall health and other factors.  · You are in good general health.  · You have a smoking history of 1 pack of cigarettes a day for 20 years or 2 packs a day for 10 years.  Screening may also be recommended if you are at high risk for the disease. You may be at high risk if:  · You have a family history of lung cancer.  · You have been exposed to asbestos or radon.  · You have chronic obstructive pulmonary disease (COPD).  How is screening done?    The recommended screening test is a low-dose computed tomography (LDCT) scan. This scan takes detailed images of the lungs. This allows a health care provider to look for abnormal cells. If you are at risk for lung cancer, it is recommended that you get  screened once a year. Talk to your health care provider about the risks, benefits, and limitations of screening.  What are the benefits of screening?  Screening can find lung cancer early, before symptoms start and before it has spread outside of the lungs. The chances of curing lung cancer are greater if the cancer is diagnosed early.  What are the risks of screening?  · The screening may show lung cancer when no cancer is present (false-positive result).  · The screening may not find lung cancer when it is present.  · The person gets exposed to radiation.  How can I lower my risk of lung cancer?  Make these lifestyle changes to lower your risk of developing lung cancer:  · Do not use any products that contain nicotine or tobacco, such as cigarettes, e-cigarettes, and chewing tobacco. If you need help quitting, ask your health care provider.  · Avoid secondhand smoke.  · Avoid exposure to radiation.  · Avoid exposure to radon gas. Have your home checked for radon regularly.  · Avoid things that cause cancer (carcinogens).  · Avoid living or working in places with high air pollution.  Questions to ask your health care provider  · Am I eligible for lung cancer screening?  · Does my health insurance cover the cost of lung cancer screening?  · What happens if the lung cancer screening shows something of concern?  · How soon will I have results from my lung cancer screening?  · Is there anything that I need to do to prepare for my lung cancer screening?  · What happens if I decide not to have lung cancer screening?  Where to find more information  Ask your health care provider about the risks and benefits of screening. More information and resources are available from these organizations:  · American Cancer Society (ACS): www.cancer.org  · American Lung Association: www.lung.org  Contact a health care provider if:  · You start to show symptoms of lung cancer, including:  ? Coughing that will not go away.  ? Making  whistling sounds when you breathe (wheezing).  ? Chest pain.  ? Coughing up blood.  ? Shortness of breath.  ? Weight loss that cannot be explained.  ? Constant tiredness (fatigue).  ? Hoarse voice.  Summary  · Lung cancer screening may find lung cancer before symptoms appear. Finding cancer early improves the chances of successful treatment. It may save your life.  · The recommended screening test is a low-dose computed tomography (LDCT) scan that looks for abnormal cells in the lungs. If you are at risk for lung cancer, it is recommended that you get screened once a year.  · You can make lifestyle changes to lower your risk of lung cancer.  · Ask your health care provider about the risks and benefits of screening.  This information is not intended to replace advice given to you by your health care provider. Make sure you discuss any questions you have with your health care provider.  Document Revised: 05/10/2021 Document Reviewed: 12/15/2020  [employer] Patient Education © 2021 [employer]

## 2021-12-17 NOTE — PROGRESS NOTES
The ABCs of the Annual Wellness Visit  Subsequent Medicare Wellness Visit    Chief Complaint  Subsequent Medicare Wellness Visit    Subjective    History of Present Illness:  Albino Estrella is a 71 y.o. female who presents for a Subsequent Medicare Wellness Visit.    COPD  She continues to experience intermittent SOB, cough and wheezing.  There is no history of any chest pain or hemoptysis and she continues to deny any fever, chills, or night sweats. She remains on trelegy and feels that it has helped considerably. She continues to use nebulized albuterol 4 times daily. She continues to smoke 1 pack per day. Low dose CT of the chest performed on 4/2/18 was reported as showing a 15 mm RLL pulmonary nodule, COPD, mild coronary artery vascular calcifications, and a cyst of the left upper kidney. PET scan performed on 4/27/18 revealed no hypermetabolism of the lesion noted on CT but a diffusely enlarged and hypermetabolic thyroid. U/S of the kidneys performed the same day was unremarkable. Follow up CT of the chest was performed on 9/25/18 revealed a decrease in the RLL pulmonary nodule to 13 mm.  Her most recent study performed on 11/30/2020 was stable and a 1 year follow-up was recommended    Hypertension  Home blood pressure readings: not doing. Associated signs and symptoms: dyspnea.  She continues to deny any chest pain, palpitations, orthopnea, paroxysmal nocturnal dyspnea or peripheral edema. Current antihypertensive medications includes diltiazem and spironolactone.     Dyslipidemia  Compliance with treatment has been fair. The patient exercises intermittently. She remains on atorvastatin with no apparent side effects.  She has had no recent labs    Gastroesophageal Reflux Disease  She remains heartburn free on pantoprazole.  She continues to deny any difficulty swallowing, regurgitation, vomiting, abdominal pain, change in her bowel habits, hematochezia, or melena.    Pelvic Pressure  She continues to experience  pelvic pressure with prolonged standing, pushing, pulling, or lifting. This is associated with urinary frequency, urgency, dysuria, and occasional incontinence. She continues to deny any hematuria, vaginal bleeding or discharge, change in her bowel habits, hematochezia, melena, fever or chills. She has a history of recurrent urinary tract infections.  She was tried on tolterodine but stopped it due to a lack of effect and increased fatigue.  Underwent a gynecology assessment with no significant abnormalities found on history or physical exam.  She was offered a cystoscopy but decided to defer this    The following portions of the patient's history were reviewed and   updated as appropriate: allergies, current medications, past family history, past medical history, past social history, past surgical history and problem list.    Compared to one year ago, the patient feels her physical   health is the same.    Compared to one year ago, the patient feels her mental   health is the same.    Recent Hospitalizations:  She was not admitted to the hospital during the last year.     Current Medical Providers:  Patient Care Team:  Stan Alvarado MD as PCP - General  Stan Alvarado MD as PCP - Family Medicine    Outpatient Medications Prior to Visit   Medication Sig Dispense Refill   • Zoster Vac Recomb Adjuvanted (Shingrix) 50 MCG/0.5ML reconstituted suspension Inject 0.5 mL into the appropriate muscle as directed by prescriber See Admin Instructions. Repeat in 2-6 months 1 each 1   • aspirin 81 MG EC tablet Take 1 tablet by mouth Daily. 30 tablet 5   • atorvastatin (LIPITOR) 40 MG tablet Take 1 tablet by mouth every night at bedtime. 30 tablet 5   • dilTIAZem (TIAZAC) 360 MG 24 hr capsule Take 1 capsule by mouth Daily. 30 capsule 5   • estradiol (ESTRACE) 0.1 MG/GM vaginal cream Insert 2 g into the vagina 2 (Two) Times a Week. 20 g 5   • fluconazole (DIFLUCAN) 150 MG tablet Take 1 tablet by mouth Daily As  Needed (yeast infe). 3 tablet 0   • ipratropium-albuterol (DUO-NEB) 0.5-2.5 mg/3 ml nebulizer Take 3 mL by nebulization 4 (Four) Times a Day As Needed for Wheezing or Shortness of Air. 540 mL 5   • meclizine (Antivert) 25 MG tablet Take 1 tablet by mouth 3 (Three) Times a Day As Needed for Dizziness. 90 tablet 5   • montelukast (SINGULAIR) 10 MG tablet Take 1 tablet by mouth Every Night. 30 tablet 5   • pantoprazole (PROTONIX) 40 MG EC tablet Take 1 tablet by mouth 2 (Two) Times a Day. 60 tablet 5   • promethazine (PHENERGAN) 25 MG tablet Take 1 tablet by mouth Every 6 (Six) Hours As Needed for Nausea or Vomiting. 30 tablet 0   • spironolactone (ALDACTONE) 25 MG tablet Take 1 tablet by mouth Every Morning. 30 tablet 5   • SUMAtriptan (IMITREX) 100 MG tablet Take one tablet at onset of headache. May repeat dose one time in 2 hours if headache not relieved. 9 tablet 5   • Trelegy Ellipta 100-62.5-25 MCG/INH inhaler INHALE 1 PUFF BY MOUTH DAILY 60 each 5   • vitamin B-12 (CYANOCOBALAMIN) 1000 MCG tablet Take 1 tablet by mouth Daily. 30 tablet 5     No facility-administered medications prior to visit.     No opioid medication identified on active medication list. I have reviewed chart for other potential  high risk medication/s and harmful drug interactions in the elderly.          Aspirin is on active medication list. Aspirin use is indicated based on review of current medical condition/s. Pros and cons of this therapy have been discussed today. Benefits of this medication outweigh potential harm.  Patient has been encouraged to continue taking this medication.  .    Patient Active Problem List   Diagnosis   • Bladder instability   • COPD mixed type (HCC)   • Dyspareunia   • Gastroesophageal reflux disease without esophagitis   • H/O recurrent urinary tract infection   • Mixed hyperlipidemia   • Essential hypertension   • IBS (irritable bowel syndrome)   • Prediabetes   • Primary osteoarthritis   • Migraine without  "aura and without status migrainosus, not intractable   • Encounter for immunization   • Healthcare maintenance   • Smoker   • Pelvic relaxation   • Age-related osteoporosis without current pathological fracture   • Goiter   • Elevated TSH   • Coronary artery calcification seen on CT scan     Advance Care Planning  Advance Directive is not on file.  ACP discussion was held with the patient during this visit. Patient does not have an advance directive, information provided.    Review of Systems   Constitutional: Positive for fatigue. Negative for appetite change, chills, diaphoresis and fever.   HENT: Positive for congestion and rhinorrhea. Negative for ear pain, postnasal drip, sinus pressure, sneezing, sore throat, tinnitus and voice change.    Eyes: Negative for visual disturbance.   Respiratory: Positive for cough, shortness of breath and wheezing.    Cardiovascular: Negative for chest pain, palpitations and leg swelling.   Gastrointestinal: Negative for abdominal pain, blood in stool, constipation, diarrhea, nausea and vomiting.   Endocrine: Negative for polydipsia and polyuria.   Genitourinary: Positive for dysuria, frequency, pelvic pain (pressure) and urgency. Negative for hematuria.   Musculoskeletal: Negative for arthralgias, back pain, joint swelling and myalgias.   Skin: Negative for rash.   Neurological: Negative for weakness, numbness and confusion.   Psychiatric/Behavioral: Negative for decreased concentration, dysphoric mood, sleep disturbance and suicidal ideas. The patient is not nervous/anxious.         Objective    Vitals:    12/17/21 1054   BP: 124/65   Pulse: 84   Resp: 15   Temp: 97.3 °F (36.3 °C)   TempSrc: Temporal   SpO2: 99%   Weight: 47.6 kg (105 lb)   Height: 154.9 cm (61\")     BMI Readings from Last 1 Encounters:   12/17/21 19.84 kg/m²   BMI is within normal parameters. No follow-up required.    Does the patient have evidence of cognitive impairment? No    Physical Exam  Constitutional:  "      General: She is not in acute distress.     Appearance: Normal appearance. She is well-developed. She is not diaphoretic.      Comments: Accompanied by her . Bright and in fair spirits. No apparent distress. No pallor, jaundice, diaphoresis, or cyanosis.   HENT:      Head: Atraumatic.      Right Ear: Tympanic membrane, ear canal and external ear normal.      Left Ear: Tympanic membrane, ear canal and external ear normal.   Eyes:      Conjunctiva/sclera: Conjunctivae normal.   Neck:      Thyroid: Thyromegaly (right lobe more prominent then the left) present. No thyroid mass.      Vascular: No carotid bruit or JVD.      Trachea: Trachea normal. No tracheal deviation.   Cardiovascular:      Rate and Rhythm: Normal rate and regular rhythm.      Heart sounds: Normal heart sounds, S1 normal and S2 normal. No murmur heard.  No gallop.    Pulmonary:      Effort: Pulmonary effort is normal.      Breath sounds: Examination of the right-lower field reveals decreased breath sounds. Examination of the left-lower field reveals decreased breath sounds. Decreased breath sounds and wheezing (diffuse - mild) present. No rales.      Comments: Pulmonary hyperinflation  Chest:   Breasts:      Right: No supraclavicular adenopathy.      Left: No supraclavicular adenopathy.       Abdominal:      General: Bowel sounds are normal. There is no distension or abdominal bruit.      Palpations: Abdomen is soft. There is no hepatomegaly, splenomegaly or mass.      Tenderness: There is no abdominal tenderness.      Hernia: No hernia is present.   Musculoskeletal:      Right lower leg: No edema.      Left lower leg: No edema.   Lymphadenopathy:      Head:      Right side of head: No submental, submandibular, tonsillar, preauricular, posterior auricular or occipital adenopathy.      Left side of head: No submental, submandibular, tonsillar, preauricular, posterior auricular or occipital adenopathy.      Cervical: No cervical adenopathy.       Upper Body:      Right upper body: No supraclavicular adenopathy.      Left upper body: No supraclavicular adenopathy.   Skin:     General: Skin is warm.      Coloration: Skin is not cyanotic, jaundiced or pale.      Findings: No rash.      Nails: There is no clubbing.   Neurological:      Mental Status: She is alert and oriented to person, place, and time.      Cranial Nerves: No cranial nerve deficit.      Motor: No tremor.      Coordination: Coordination normal.      Gait: Gait normal.   Psychiatric:         Attention and Perception: Attention normal.         Mood and Affect: Mood normal.         Speech: Speech normal.         Behavior: Behavior normal.         Thought Content: Thought content normal.       HEALTH RISK ASSESSMENT    Smoking Status:  Social History     Tobacco Use   Smoking Status Current Every Day Smoker   • Packs/day: 1.00   • Types: Cigarettes   Smokeless Tobacco Never Used     Alcohol Consumption:  Social History     Substance and Sexual Activity   Alcohol Use No     Fall Risk Screen:  [facility] Fall Risk Assessment was completed, and patient is at LOW risk for falls.Assessment completed on:12/17/2021    Depression Screening:  PHQ-2/PHQ-9 Depression Screening 12/17/2021   Little interest or pleasure in doing things 0   Feeling down, depressed, or hopeless 0   Trouble falling or staying asleep, or sleeping too much 0   Feeling tired or having little energy 0   Poor appetite or overeating 0   Feeling bad about yourself - or that you are a failure or have let yourself or your family down 0   Trouble concentrating on things, such as reading the newspaper or watching television 0   Moving or speaking so slowly that other people could have noticed. Or the opposite - being so fidgety or restless that you have been moving around a lot more than usual 0   Thoughts that you would be better off dead, or of hurting yourself in some way 0   Total Score 0   If you checked off any problems, how difficult have  these problems made it for you to do your work, take care of things at home, or get along with other people? Not difficult at all     Health Habits and Functional and Cognitive Screening:  Functional & Cognitive Status 12/17/2021   Do you have difficulty preparing food and eating? No   Do you have difficulty bathing yourself, getting dressed or grooming yourself? No   Do you have difficulty using the toilet? No   Do you have difficulty moving around from place to place? No   Do you have trouble with steps or getting out of a bed or a chair? No   Current Diet Well Balanced Diet   Dental Exam Not up to date   Eye Exam Up to date   Exercise (times per week) 7 times per week   Current Exercises Include Walking   Current Exercise Activities Include -   Do you need help using the phone?  No   Are you deaf or do you have serious difficulty hearing?  No   Do you need help with transportation? No   Do you need help shopping? No   Do you need help preparing meals?  No   Do you need help with housework?  No   Do you need help with laundry? No   Do you need help taking your medications? No   Do you need help managing money? No   Do you ever drive or ride in a car without wearing a seat belt? No   Have you felt unusual stress, anger or loneliness in the last month? No   Who do you live with? Spouse   If you need help, do you have trouble finding someone available to you? No   Have you been bothered in the last four weeks by sexual problems? No   Do you have difficulty concentrating, remembering or making decisions? No     Age-appropriate Screening Schedule:  Refer to the list below for future screening recommendations based on patient's age, sex and/or medical conditions. Orders for these recommended tests are listed in the plan section. The patient has been provided with a written plan.    Health Maintenance   Topic Date Due   • ZOSTER VACCINE (1 of 2) Never done   • PAP SMEAR  06/13/2017   • LIPID PANEL  02/09/2022   • DXA  SCAN  11/19/2022   • MAMMOGRAM  06/15/2023   • TDAP/TD VACCINES (2 - Td or Tdap) 09/22/2024   • INFLUENZA VACCINE  Completed        Assessment/Plan   CMS Preventative Services Quick Reference  Risk Factors Identified During Encounter  Cardiovascular Disease  Immunizations Discussed/Encouraged (specific Immunizations; Shingrix and COVID19  Tobacco Use/Dependance (use dotphrase .tobaccocessation for documentation)  The above risks/problems have been discussed with the patient.  Follow up actions/plans if indicated are seen below in the Assessment/Plan Section.  Pertinent information has been shared with the patient in the After Visit Summary.    Diagnoses and all orders for this visit:    1. Coronary artery calcification seen on CT scan   Reminded regarding risk factor modification with an emphasis on tobacco cessation.  Continue low-dose ASA    2. Essential hypertension   Hypertension: at goal. Evidence of target organ damage: coronary artery calcifications on previous CT.  Encouraged to continue to work on diet and exercise plan.   Continue current medication  -     aspirin 81 MG EC tablet; Take 1 tablet by mouth Daily.  Dispense: 30 tablet; Refill: 5  -     dilTIAZem (TIAZAC) 360 MG 24 hr capsule; Take 1 capsule by mouth Daily.  Dispense: 30 capsule; Refill: 5  -     spironolactone (ALDACTONE) 25 MG tablet; Take 1 tablet by mouth Every Morning.  Dispense: 30 tablet; Refill: 5    3. Mixed hyperlipidemia  As above.   Continue current medication.  -     atorvastatin (LIPITOR) 40 MG tablet; Take 1 tablet by mouth every night at bedtime.  Dispense: 30 tablet; Refill: 5    4. Goiter  Clinically euthyroid.  Will continue to monitor    5. Prediabetes  As above.  Will continue to monitor    6. Gastroesophageal reflux disease without esophagitis  -     pantoprazole (PROTONIX) 40 MG EC tablet; Take 1 tablet by mouth 2 (Two) Times a Day.  Dispense: 60 tablet; Refill: 5    7. Other irritable bowel syndrome    8. Bladder  instability    9. H/O recurrent urinary tract infection  Urinalysis unremarkable  Patient remains uninterested in a gynecology or urology reassessment or in a cystoscopy  -     POC Urinalysis Dipstick, Automated    10. Pelvic relaxation    11. Healthcare maintenance  Patient has already received a flu shot along with a third dose of the Moderna COVID-19 vaccine this fall  Reminded that she is due for Shingrix  We will arrange an updated low-dose CT of the chest  -      CT Chest Low Dose Cancer Screening WO; Future  -     vitamin B-12 (CYANOCOBALAMIN) 1000 MCG tablet; Take 1 tablet by mouth Daily.  Dispense: 30 tablet; Refill: 5    12. Age-related osteoporosis without current pathological fracture  Encouraged to continue to pursue weight bearing activities while exercising joint protection.  We will plan on updating a DEXA scan with her next mammogram    13. Primary osteoarthritis involving multiple joints    14. Migraine without aura and without status migrainosus, not intractable  -     SUMAtriptan (IMITREX) 100 MG tablet; Take one tablet at onset of headache. May repeat dose one time in 2 hours if headache not relieved.  Dispense: 9 tablet; Refill: 5    15. COPD mixed type (HCC)   COPD is stable.  Reminded of the importance of smoking cessation  Encouraged to remain as active as symptoms allow for  Continue current medication  -     Fluticasone-Umeclidin-Vilant (Trelegy Ellipta) 100-62.5-25 MCG/INH inhaler; Inhale 1 puff Daily.  Dispense: 60 each; Refill: 5    16. Smoker  Lengthy discussion regarding the potential sequela of continued tobacco use and the options with respect to cessation.  Patient remains uninterested but will consider  -      CT Chest Low Dose Cancer Screening WO; Future            Follow Up:   Return in about 3 months (around 3/24/2022).     An After Visit Summary and PPPS were made available to the patient.

## 2021-12-18 VITALS
RESPIRATION RATE: 15 BRPM | DIASTOLIC BLOOD PRESSURE: 65 MMHG | SYSTOLIC BLOOD PRESSURE: 124 MMHG | TEMPERATURE: 97.3 F | HEART RATE: 84 BPM | HEIGHT: 61 IN | WEIGHT: 105 LBS | OXYGEN SATURATION: 99 % | BODY MASS INDEX: 19.83 KG/M2

## 2022-01-14 ENCOUNTER — HOSPITAL ENCOUNTER (OUTPATIENT)
Dept: CT IMAGING | Facility: HOSPITAL | Age: 72
Discharge: HOME | End: 2022-01-14
Admitting: GENERAL PRACTICE

## 2022-01-14 DIAGNOSIS — F17.200 SMOKER: ICD-10-CM

## 2022-01-14 DIAGNOSIS — Z00.00 HEALTHCARE MAINTENANCE: ICD-10-CM

## 2022-01-14 DIAGNOSIS — Z87.891 PERSONAL HISTORY OF NICOTINE DEPENDENCE: ICD-10-CM

## 2022-01-14 PROCEDURE — 71271 CT THORAX LUNG CANCER SCR C-: CPT | Performed by: RADIOLOGY

## 2022-01-14 PROCEDURE — 71271 CT THORAX LUNG CANCER SCR C-: CPT

## 2022-03-18 ENCOUNTER — OFFICE VISIT (OUTPATIENT)
Dept: FAMILY MEDICINE CLINIC | Facility: CLINIC | Age: 72
End: 2022-03-18

## 2022-03-18 DIAGNOSIS — K58.8 OTHER IRRITABLE BOWEL SYNDROME: ICD-10-CM

## 2022-03-18 DIAGNOSIS — I25.10 CORONARY ARTERY CALCIFICATION SEEN ON CT SCAN: Primary | ICD-10-CM

## 2022-03-18 DIAGNOSIS — N81.89 PELVIC RELAXATION: ICD-10-CM

## 2022-03-18 DIAGNOSIS — I10 ESSENTIAL HYPERTENSION: ICD-10-CM

## 2022-03-18 DIAGNOSIS — M81.0 AGE-RELATED OSTEOPOROSIS WITHOUT CURRENT PATHOLOGICAL FRACTURE: ICD-10-CM

## 2022-03-18 DIAGNOSIS — M15.9 PRIMARY OSTEOARTHRITIS INVOLVING MULTIPLE JOINTS: ICD-10-CM

## 2022-03-18 DIAGNOSIS — R73.03 PREDIABETES: ICD-10-CM

## 2022-03-18 DIAGNOSIS — E04.9 GOITER: ICD-10-CM

## 2022-03-18 DIAGNOSIS — Z00.00 HEALTHCARE MAINTENANCE: ICD-10-CM

## 2022-03-18 DIAGNOSIS — N32.89 BLADDER INSTABILITY: ICD-10-CM

## 2022-03-18 DIAGNOSIS — G43.009 MIGRAINE WITHOUT AURA AND WITHOUT STATUS MIGRAINOSUS, NOT INTRACTABLE: ICD-10-CM

## 2022-03-18 DIAGNOSIS — F17.200 SMOKER: ICD-10-CM

## 2022-03-18 DIAGNOSIS — R79.89 ELEVATED TSH: ICD-10-CM

## 2022-03-18 DIAGNOSIS — K21.9 GASTROESOPHAGEAL REFLUX DISEASE WITHOUT ESOPHAGITIS: ICD-10-CM

## 2022-03-18 DIAGNOSIS — E78.2 MIXED HYPERLIPIDEMIA: ICD-10-CM

## 2022-03-18 DIAGNOSIS — J44.9 COPD MIXED TYPE: ICD-10-CM

## 2022-03-18 PROCEDURE — 36415 COLL VENOUS BLD VENIPUNCTURE: CPT | Performed by: GENERAL PRACTICE

## 2022-03-18 PROCEDURE — 99214 OFFICE O/P EST MOD 30 MIN: CPT | Performed by: GENERAL PRACTICE

## 2022-03-18 NOTE — PROGRESS NOTES
Subjective   Albino Estrella is a 71 y.o. female.     Chief Complaint  She returns for a scheduled reassessment of multiple medical problems including COPD, essential hypertension, hyperlipidemia, and gastroesophageal reflux disease    History of Present Illness     COPD  She continues to experience intermittent SOB, cough and wheezing.  There is no history of any chest pain or hemoptysis and she continues to deny any fever, chills, or night sweats. She remains on trelegy and feels that it has helped considerably. She continues to use nebulized albuterol 4 times daily. She continues to smoke 1 pack per day. Low dose CT of the chest performed on 1/14/2022 was reported as showing a benign appearing stable 14.8 mm pleural-based RLL nodule, moderate COPD, and moderate coronary artery calcifications.  A one year follow-up was recommended    Hypertension  Home blood pressure readings: not doing. Associated signs and symptoms: dyspnea.  She continues to deny any chest pain, palpitations, orthopnea, paroxysmal nocturnal dyspnea or peripheral edema. Current antihypertensive medications includes diltiazem and spironolactone.     Dyslipidemia  Compliance with treatment has been fair. The patient exercises intermittently. She remains on atorvastatin with no apparent side effects.  She has had no recent labs    Gastroesophageal Reflux Disease  She remains heartburn free on pantoprazole.  She continues to deny any difficulty swallowing, regurgitation, vomiting, abdominal pain, change in her bowel habits, hematochezia, or melena.    Pelvic Pressure  She continues to experience pelvic pressure with prolonged standing, pushing, pulling, or lifting. This is associated with urinary frequency, urgency, dysuria, and occasional incontinence. She continues to deny any hematuria, vaginal bleeding or discharge, change in her bowel habits, hematochezia, melena, fever or chills. She has a history of recurrent urinary tract infections.  She was  tried on tolterodine but stopped it due to a lack of effect and increased fatigue.  Underwent a gynecology assessment with no significant abnormalities found on history or physical exam.  She was offered a cystoscopy but decided to defer this    The following portions of the patient's history were reviewed and updated as appropriate: allergies, current medications, past medical history, past social history and problem list.    Review of Systems   Constitutional: Positive for fatigue. Negative for appetite change, chills, fever and unexpected weight change.   HENT: Positive for congestion and rhinorrhea. Negative for ear pain, postnasal drip, sinus pressure, sneezing, sore throat and voice change.    Eyes: Negative for visual disturbance.   Respiratory: Positive for cough, shortness of breath and wheezing. Negative for stridor.    Cardiovascular: Negative for chest pain, palpitations and leg swelling.   Gastrointestinal: Negative for abdominal pain, blood in stool, constipation, diarrhea, nausea and vomiting.   Genitourinary: Positive for dysuria (intermittent), frequency, pelvic pain (pressure) and urgency. Negative for difficulty urinating, hematuria and vaginal discharge.   Musculoskeletal: Negative for arthralgias, back pain and myalgias.   Skin: Negative for rash.   Neurological: Positive for dizziness (chronic-intermittent - responds to meclizine) and headaches (chronic - intermittent - stable). Negative for weakness and numbness.   Psychiatric/Behavioral: Negative for dysphoric mood and sleep disturbance. The patient is not nervous/anxious.      Objective   Physical Exam  Constitutional:       General: She is not in acute distress.     Appearance: Normal appearance. She is well-developed. She is not diaphoretic.      Comments: Accompanied by her . Bright and in fair spirits. No apparent distress. No pallor, jaundice, diaphoresis, or cyanosis.   HENT:      Head: Atraumatic.      Right Ear: Tympanic  membrane, ear canal and external ear normal.      Left Ear: Tympanic membrane, ear canal and external ear normal.   Eyes:      Conjunctiva/sclera: Conjunctivae normal.   Neck:      Thyroid: Thyromegaly (right lobe more prominent then the left) present. No thyroid mass.      Vascular: No carotid bruit or JVD.      Trachea: Trachea normal. No tracheal deviation.   Cardiovascular:      Rate and Rhythm: Normal rate and regular rhythm.      Heart sounds: Normal heart sounds, S1 normal and S2 normal. No murmur heard.    No gallop.   Pulmonary:      Effort: Pulmonary effort is normal.      Breath sounds: Examination of the right-lower field reveals decreased breath sounds. Examination of the left-lower field reveals decreased breath sounds. Decreased breath sounds and wheezing (diffuse - mild) present. No rales.      Comments: Pulmonary hyperinflation  Chest:   Breasts:      Right: No supraclavicular adenopathy.      Left: No supraclavicular adenopathy.       Abdominal:      General: Bowel sounds are normal. There is no distension.   Musculoskeletal:      Right lower leg: No edema.      Left lower leg: No edema.   Lymphadenopathy:      Head:      Right side of head: No submental, submandibular, tonsillar, preauricular, posterior auricular or occipital adenopathy.      Left side of head: No submental, submandibular, tonsillar, preauricular, posterior auricular or occipital adenopathy.      Cervical: No cervical adenopathy.      Upper Body:      Right upper body: No supraclavicular adenopathy.      Left upper body: No supraclavicular adenopathy.   Skin:     General: Skin is warm.      Coloration: Skin is not cyanotic, jaundiced or pale.      Findings: No rash.      Nails: There is no clubbing.   Neurological:      Mental Status: She is alert and oriented to person, place, and time.      Cranial Nerves: No cranial nerve deficit.      Motor: No tremor.      Coordination: Coordination normal.      Gait: Gait normal.    Psychiatric:         Attention and Perception: Attention normal.         Mood and Affect: Mood normal.         Speech: Speech normal.         Behavior: Behavior normal.         Thought Content: Thought content normal.       Assessment/Plan   Problems Addressed this Visit        Cardiac and Vasculature    Coronary artery calcification seen on CT scan   Reminded regarding risk factor modification with an emphasis on tobacco cessation.  Continue low-dose ASA    Relevant Orders    CBC & Differential (Completed)    Essential hypertension   Hypertension: at goal. Evidence of target organ damage: coronary artery calcifications on CT.  Encouraged to continue to work on diet and exercise plan.   Continue current medication    Relevant Orders    CBC & Differential (Completed)    Comprehensive Metabolic Panel (Completed)    Mixed hyperlipidemia  As above.   Continue current medication.  Updated labs drawn.    Relevant Orders    Comprehensive Metabolic Panel (Completed)    Lipid Panel (Completed)       Endocrine and Metabolic    Goiter  Will continue to monitor    Relevant Orders    TSH (Completed)    Prediabetes  Will continue to monitor    Relevant Orders    Hemoglobin A1c (Completed)    Vitamin B12 (Completed)       Gastrointestinal Abdominal     Gastroesophageal reflux disease without esophagitis   Symptoms are currently well controlled.  Reminded regarding lifestyle modification.  Continue current medication.    IBS (irritable bowel syndrome)       Genitourinary and Reproductive     Bladder instability    Relevant Orders    Urinalysis With Culture If Indicated - Urine, Clean Catch (Completed)    Pelvic relaxation  Patient remains uninterested in a gynecology reassessment       Health Encounters    Healthcare maintenance  Patient has received a third dose of the COVID-19 vaccine.  We will arrange an updated mammogram at her return       Musculoskeletal and Injuries    Age-related osteoporosis without current pathological  fracture  Encouraged to continue to pursue weight bearing activities while exercising joint protection.  Continue current medication    Relevant Orders    Vitamin D 25 Hydroxy (Completed)    Primary osteoarthritis       Neuro    Migraine without aura and without status migrainosus, not intractable       Pulmonary and Pneumonias    COPD mixed type (HCC)   COPD is stable.  Reminded of the importance of smoking cessation  Encouraged to remain as active as symptoms allow for  Continue current medication       Symptoms and Signs    Elevated TSH  Will continue to monitor    Relevant Orders    TSH (Completed)       Tobacco    Smoker      Diagnoses       Codes Comments    Coronary artery calcification seen on CT scan    -  Primary ICD-10-CM: I25.10  ICD-9-CM: 414.00     Essential hypertension     ICD-10-CM: I10  ICD-9-CM: 401.9     Mixed hyperlipidemia     ICD-10-CM: E78.2  ICD-9-CM: 272.2     Goiter     ICD-10-CM: E04.9  ICD-9-CM: 240.9     Prediabetes     ICD-10-CM: R73.03  ICD-9-CM: 790.29     Gastroesophageal reflux disease without esophagitis     ICD-10-CM: K21.9  ICD-9-CM: 530.81     Other irritable bowel syndrome     ICD-10-CM: K58.8  ICD-9-CM: 564.1     Bladder instability     ICD-10-CM: N32.89  ICD-9-CM: 596.89     Pelvic relaxation     ICD-10-CM: N81.89  ICD-9-CM: 618.89     Healthcare maintenance     ICD-10-CM: Z00.00  ICD-9-CM: V70.0     Age-related osteoporosis without current pathological fracture     ICD-10-CM: M81.0  ICD-9-CM: 733.01     Primary osteoarthritis involving multiple joints     ICD-10-CM: M89.49  ICD-9-CM: 715.98     Migraine without aura and without status migrainosus, not intractable     ICD-10-CM: G43.009  ICD-9-CM: 346.10     COPD mixed type (HCC)     ICD-10-CM: J44.9  ICD-9-CM: 496     Elevated TSH     ICD-10-CM: R79.89  ICD-9-CM: 794.5     Smoker     ICD-10-CM: F17.200  ICD-9-CM: 305.1

## 2022-03-19 VITALS
SYSTOLIC BLOOD PRESSURE: 126 MMHG | WEIGHT: 105 LBS | TEMPERATURE: 97.2 F | OXYGEN SATURATION: 99 % | RESPIRATION RATE: 14 BRPM | HEIGHT: 61 IN | BODY MASS INDEX: 19.83 KG/M2 | HEART RATE: 70 BPM | DIASTOLIC BLOOD PRESSURE: 70 MMHG

## 2022-03-19 LAB
25(OH)D3+25(OH)D2 SERPL-MCNC: 44.3 NG/ML (ref 30–100)
ALBUMIN SERPL-MCNC: 4.4 G/DL (ref 3.5–5.2)
ALBUMIN/GLOB SERPL: 1.2 G/DL
ALP SERPL-CCNC: 67 U/L (ref 39–117)
ALT SERPL-CCNC: 10 U/L (ref 1–33)
APPEARANCE UR: CLEAR
AST SERPL-CCNC: 13 U/L (ref 1–32)
BACTERIA #/AREA URNS HPF: NORMAL /HPF
BASOPHILS # BLD AUTO: 0.1 10*3/MM3 (ref 0–0.2)
BASOPHILS NFR BLD AUTO: 0.8 % (ref 0–1.5)
BILIRUB SERPL-MCNC: 0.3 MG/DL (ref 0–1.2)
BILIRUB UR QL STRIP: NEGATIVE
BUN SERPL-MCNC: 12 MG/DL (ref 8–23)
BUN/CREAT SERPL: 13.5 (ref 7–25)
CALCIUM SERPL-MCNC: 9.3 MG/DL (ref 8.6–10.5)
CASTS URNS QL MICRO: NORMAL /LPF
CHLORIDE SERPL-SCNC: 101 MMOL/L (ref 98–107)
CHOLEST SERPL-MCNC: 196 MG/DL (ref 0–200)
CO2 SERPL-SCNC: 24.3 MMOL/L (ref 22–29)
COLOR UR: YELLOW
CREAT SERPL-MCNC: 0.89 MG/DL (ref 0.57–1)
EGFRCR SERPLBLD CKD-EPI 2021: 69.4 ML/MIN/1.73
EOSINOPHIL # BLD AUTO: 0.15 10*3/MM3 (ref 0–0.4)
EOSINOPHIL NFR BLD AUTO: 1.2 % (ref 0.3–6.2)
EPI CELLS #/AREA URNS HPF: NORMAL /HPF (ref 0–10)
ERYTHROCYTE [DISTWIDTH] IN BLOOD BY AUTOMATED COUNT: 12.3 % (ref 12.3–15.4)
GLOBULIN SER CALC-MCNC: 3.6 GM/DL
GLUCOSE SERPL-MCNC: 102 MG/DL (ref 65–99)
GLUCOSE UR QL STRIP: NEGATIVE
HBA1C MFR BLD: 5.1 % (ref 4.8–5.6)
HCT VFR BLD AUTO: 41.4 % (ref 34–46.6)
HDLC SERPL-MCNC: 58 MG/DL (ref 40–60)
HGB BLD-MCNC: 13.8 G/DL (ref 12–15.9)
HGB UR QL STRIP: NEGATIVE
IMM GRANULOCYTES # BLD AUTO: 0.03 10*3/MM3 (ref 0–0.05)
IMM GRANULOCYTES NFR BLD AUTO: 0.2 % (ref 0–0.5)
KETONES UR QL STRIP: NEGATIVE
LDLC SERPL CALC-MCNC: 118 MG/DL (ref 0–100)
LEUKOCYTE ESTERASE UR QL STRIP: NEGATIVE
LYMPHOCYTES # BLD AUTO: 5.48 10*3/MM3 (ref 0.7–3.1)
LYMPHOCYTES NFR BLD AUTO: 42.6 % (ref 19.6–45.3)
MCH RBC QN AUTO: 32.6 PG (ref 26.6–33)
MCHC RBC AUTO-ENTMCNC: 33.3 G/DL (ref 31.5–35.7)
MCV RBC AUTO: 97.9 FL (ref 79–97)
MICRO URNS: NORMAL
MICRO URNS: NORMAL
MONOCYTES # BLD AUTO: 0.7 10*3/MM3 (ref 0.1–0.9)
MONOCYTES NFR BLD AUTO: 5.4 % (ref 5–12)
NEUTROPHILS # BLD AUTO: 6.41 10*3/MM3 (ref 1.7–7)
NEUTROPHILS NFR BLD AUTO: 49.8 % (ref 42.7–76)
NITRITE UR QL STRIP: NEGATIVE
NRBC BLD AUTO-RTO: 0.2 /100 WBC (ref 0–0.2)
PH UR STRIP: 6.5 [PH] (ref 5–7.5)
PLATELET # BLD AUTO: 268 10*3/MM3 (ref 140–450)
POTASSIUM SERPL-SCNC: 4.2 MMOL/L (ref 3.5–5.2)
PROT SERPL-MCNC: 8 G/DL (ref 6–8.5)
PROT UR QL STRIP: NEGATIVE
RBC # BLD AUTO: 4.23 10*6/MM3 (ref 3.77–5.28)
RBC #/AREA URNS HPF: NORMAL /HPF (ref 0–2)
SODIUM SERPL-SCNC: 138 MMOL/L (ref 136–145)
SP GR UR STRIP: 1.01 (ref 1–1.03)
TRIGL SERPL-MCNC: 112 MG/DL (ref 0–150)
TSH SERPL DL<=0.005 MIU/L-ACNC: 8.81 UIU/ML (ref 0.27–4.2)
URINALYSIS REFLEX: NORMAL
UROBILINOGEN UR STRIP-MCNC: 1 MG/DL (ref 0.2–1)
VIT B12 SERPL-MCNC: >2000 PG/ML (ref 211–946)
VLDLC SERPL CALC-MCNC: 20 MG/DL (ref 5–40)
WBC # BLD AUTO: 12.87 10*3/MM3 (ref 3.4–10.8)
WBC #/AREA URNS HPF: NORMAL /HPF (ref 0–5)

## 2022-03-28 DIAGNOSIS — I10 ESSENTIAL HYPERTENSION: ICD-10-CM

## 2022-03-28 DIAGNOSIS — K21.9 GASTROESOPHAGEAL REFLUX DISEASE WITHOUT ESOPHAGITIS: ICD-10-CM

## 2022-03-28 DIAGNOSIS — E78.2 MIXED HYPERLIPIDEMIA: ICD-10-CM

## 2022-03-28 RX ORDER — SPIRONOLACTONE 25 MG/1
25 TABLET ORAL EVERY MORNING
Qty: 30 TABLET | Refills: 5 | Status: SHIPPED | OUTPATIENT
Start: 2022-03-28 | End: 2022-09-28 | Stop reason: SDUPTHER

## 2022-03-28 RX ORDER — PANTOPRAZOLE SODIUM 40 MG/1
TABLET, DELAYED RELEASE ORAL
Qty: 60 TABLET | Refills: 5 | Status: SHIPPED | OUTPATIENT
Start: 2022-03-28 | End: 2022-09-28 | Stop reason: SDUPTHER

## 2022-03-28 RX ORDER — MONTELUKAST SODIUM 10 MG/1
10 TABLET ORAL NIGHTLY
Qty: 30 TABLET | Refills: 5 | Status: SHIPPED | OUTPATIENT
Start: 2022-03-28 | End: 2022-09-28 | Stop reason: SDUPTHER

## 2022-03-28 RX ORDER — ATORVASTATIN CALCIUM 40 MG/1
40 TABLET, FILM COATED ORAL
Qty: 30 TABLET | Refills: 5 | Status: SHIPPED | OUTPATIENT
Start: 2022-03-28 | End: 2022-09-28 | Stop reason: SDUPTHER

## 2022-06-20 ENCOUNTER — OFFICE VISIT (OUTPATIENT)
Dept: FAMILY MEDICINE CLINIC | Facility: CLINIC | Age: 72
End: 2022-06-20

## 2022-06-20 VITALS
TEMPERATURE: 98.6 F | DIASTOLIC BLOOD PRESSURE: 76 MMHG | HEART RATE: 84 BPM | OXYGEN SATURATION: 98 % | BODY MASS INDEX: 19.26 KG/M2 | RESPIRATION RATE: 15 BRPM | SYSTOLIC BLOOD PRESSURE: 142 MMHG | HEIGHT: 61 IN | WEIGHT: 102 LBS

## 2022-06-20 DIAGNOSIS — I25.10 CORONARY ARTERY CALCIFICATION SEEN ON CT SCAN: ICD-10-CM

## 2022-06-20 DIAGNOSIS — K21.9 GASTROESOPHAGEAL REFLUX DISEASE WITHOUT ESOPHAGITIS: ICD-10-CM

## 2022-06-20 DIAGNOSIS — I10 ESSENTIAL HYPERTENSION: ICD-10-CM

## 2022-06-20 DIAGNOSIS — R73.03 PREDIABETES: ICD-10-CM

## 2022-06-20 DIAGNOSIS — M54.9 MECHANICAL BACK PAIN: ICD-10-CM

## 2022-06-20 DIAGNOSIS — G43.009 MIGRAINE WITHOUT AURA AND WITHOUT STATUS MIGRAINOSUS, NOT INTRACTABLE: ICD-10-CM

## 2022-06-20 DIAGNOSIS — M15.9 PRIMARY OSTEOARTHRITIS INVOLVING MULTIPLE JOINTS: ICD-10-CM

## 2022-06-20 DIAGNOSIS — F17.200 SMOKER: ICD-10-CM

## 2022-06-20 DIAGNOSIS — E04.9 GOITER: ICD-10-CM

## 2022-06-20 DIAGNOSIS — M81.0 AGE-RELATED OSTEOPOROSIS WITHOUT CURRENT PATHOLOGICAL FRACTURE: ICD-10-CM

## 2022-06-20 DIAGNOSIS — Z12.31 ENCOUNTER FOR SCREENING MAMMOGRAM FOR BREAST CANCER: ICD-10-CM

## 2022-06-20 DIAGNOSIS — R79.89 ELEVATED TSH: ICD-10-CM

## 2022-06-20 DIAGNOSIS — N32.89 BLADDER INSTABILITY: ICD-10-CM

## 2022-06-20 DIAGNOSIS — Z00.00 HEALTHCARE MAINTENANCE: ICD-10-CM

## 2022-06-20 DIAGNOSIS — R30.0 DYSURIA: Primary | ICD-10-CM

## 2022-06-20 DIAGNOSIS — E78.2 MIXED HYPERLIPIDEMIA: ICD-10-CM

## 2022-06-20 DIAGNOSIS — N81.89 PELVIC RELAXATION: ICD-10-CM

## 2022-06-20 DIAGNOSIS — K58.8 OTHER IRRITABLE BOWEL SYNDROME: ICD-10-CM

## 2022-06-20 DIAGNOSIS — J44.9 COPD MIXED TYPE: ICD-10-CM

## 2022-06-20 LAB
BILIRUB BLD-MCNC: NEGATIVE MG/DL
CLARITY, POC: CLEAR
COLOR UR: YELLOW
GLUCOSE UR STRIP-MCNC: NEGATIVE MG/DL
KETONES UR QL: NEGATIVE
LEUKOCYTE EST, POC: NEGATIVE
NITRITE UR-MCNC: NEGATIVE MG/ML
PH UR: 6 [PH] (ref 5–8)
PROT UR STRIP-MCNC: NEGATIVE MG/DL
RBC # UR STRIP: NEGATIVE /UL
SP GR UR: 1.01 (ref 1–1.03)
UROBILINOGEN UR QL: NORMAL

## 2022-06-20 PROCEDURE — 81002 URINALYSIS NONAUTO W/O SCOPE: CPT | Performed by: GENERAL PRACTICE

## 2022-06-20 PROCEDURE — 99214 OFFICE O/P EST MOD 30 MIN: CPT | Performed by: GENERAL PRACTICE

## 2022-06-20 NOTE — PROGRESS NOTES
Subjective   Albino Estrella is a 71 y.o. female.     Chief Complaint  She returns for a scheduled reassessment of multiple medical problems including COPD, essential hypertension, hyperlipidemia, and acute on chronic low back pain    History of Present Illness     COPD  She continues to experience intermittent SOB, cough and wheezing.  There is no history of any chest pain or hemoptysis and she continues to deny any fever, chills, or night sweats. She remains on trelegy and feels that it has helped considerably. She continues to use nebulized albuterol 4 times daily. She continues to smoke 1 pack per day. Low dose CT of the chest performed on 1/14/2022 was reported as showing a benign appearing stable 14.8 mm pleural-based RLL nodule, moderate COPD, and moderate coronary artery calcifications.  A one year follow-up was recommended  Lab Results   Component Value Date    WBC 12.87 (H) 03/18/2022    HGB 13.8 03/18/2022    HCT 41.4 03/18/2022    MCV 97.9 (H) 03/18/2022     03/18/2022     Essential Hypertension  Home blood pressure readings: not doing. Associated signs and symptoms: dyspnea.  She continues to deny any chest pain, palpitations, orthopnea, paroxysmal nocturnal dyspnea or peripheral edema. Current antihypertensive medications includes diltiazem and spironolactone.   Lab Results   Component Value Date    GLUCOSE 102 (H) 03/18/2022    BUN 12 03/18/2022    CREATININE 0.89 03/18/2022    EGFRIFNONA 79 02/09/2021    EGFRIFAFRI 96 02/09/2021    BCR 13.5 03/18/2022    K 4.2 03/18/2022    CO2 24.3 03/18/2022    CALCIUM 9.3 03/18/2022    PROTENTOTREF 8.0 03/18/2022    ALBUMIN 4.40 03/18/2022    LABIL2 1.2 03/18/2022    AST 13 03/18/2022    ALT 10 03/18/2022     Lab Results   Component Value Date    ALKPHOS 67 03/18/2022    ALKPHOS 81 01/12/2017     Dyslipidemia  Her compliance with treatment has been fair.  She remains fairly active about the house. She is taking atorvastatin with no apparent side effects.     Lab Results   Component Value Date    CHOL 197 01/12/2017    CHLPL 196 03/18/2022    TRIG 112 03/18/2022    HDL 58 03/18/2022     (H) 03/18/2022     Chronic Low Back Pain  She gives a long history of low back pain worse over the last few weeks.  She denies any strain or trauma has had no change in her activities.  The pain is described as a sharp bilateral ache.  This does not radiate elsewhere and is been unassociated with any other symptoms.  She denies any changes in her strength, sensation, or bowel/bladder control and there is no history of any fever, chills, or night sweats.  The pain is worse with prolonged standing and improves with rest or change of position.    Gastroesophageal Reflux Disease  She remains heartburn free on pantoprazole.  She continues to deny any difficulty swallowing, regurgitation, vomiting, abdominal pain, change in her bowel habits, hematochezia, or melena.    Pelvic Pressure  She continues to experience pelvic pressure with prolonged standing, pushing, pulling, or lifting. This is associated with urinary frequency, urgency, dysuria, and occasional incontinence. She continues to deny any hematuria, vaginal bleeding or discharge, change in her bowel habits, hematochezia, melena, fever or chills. She has a history of recurrent urinary tract infections.  She was tried on tolterodine but stopped it due to a lack of effect and increased fatigue.  Underwent a gynecology assessment with no significant abnormalities found on history or physical exam.  She was offered a cystoscopy but decided to defer this    Labs  Most recent vitamin D 44.3 with a B12 of >2000  Lab Results   Component Value Date    TSH 8.810 (H) 03/18/2022     The following portions of the patient's history were reviewed and updated as appropriate: allergies, current medications, past medical history, past social history and problem list.    Review of Systems   Constitutional: Positive for fatigue. Negative for  appetite change, chills, fever and unexpected weight change.   HENT: Positive for congestion and rhinorrhea. Negative for ear pain, postnasal drip, sinus pressure, sneezing, sore throat and voice change.    Eyes: Negative for visual disturbance.   Respiratory: Positive for cough, shortness of breath and wheezing. Negative for stridor.    Cardiovascular: Negative for chest pain, palpitations and leg swelling.   Gastrointestinal: Negative for abdominal pain, blood in stool, constipation, diarrhea, nausea and vomiting.   Genitourinary: Positive for dysuria (intermittent), frequency, pelvic pain (pressure) and urgency. Negative for difficulty urinating, hematuria and vaginal discharge.   Musculoskeletal: Positive for back pain. Negative for arthralgias and myalgias.   Skin: Negative for rash.   Neurological: Positive for dizziness (chronic-intermittent - responds to meclizine) and headaches (chronic - intermittent - stable). Negative for weakness and numbness.   Psychiatric/Behavioral: Negative for dysphoric mood and sleep disturbance. The patient is not nervous/anxious.      Objective   Physical Exam  Constitutional:       General: She is not in acute distress.     Appearance: Normal appearance. She is well-developed. She is not diaphoretic.      Comments: Accompanied by her . Bright and in fair spirits. No apparent distress. No pallor, jaundice, diaphoresis, or cyanosis.   HENT:      Head: Atraumatic.      Right Ear: Tympanic membrane, ear canal and external ear normal.      Left Ear: Tympanic membrane, ear canal and external ear normal.   Eyes:      Conjunctiva/sclera: Conjunctivae normal.   Neck:      Thyroid: Thyromegaly (right lobe more prominent then the left) present. No thyroid mass.      Vascular: No carotid bruit or JVD.      Trachea: Trachea normal. No tracheal deviation.   Cardiovascular:      Rate and Rhythm: Normal rate and regular rhythm.      Heart sounds: Normal heart sounds, S1 normal and S2  normal. No murmur heard.    No gallop.   Pulmonary:      Effort: Pulmonary effort is normal.      Breath sounds: Examination of the right-lower field reveals decreased breath sounds. Examination of the left-lower field reveals decreased breath sounds. Decreased breath sounds and wheezing (diffuse - mild) present. No rales.      Comments: Pulmonary hyperinflation  Chest:   Breasts:      Right: No supraclavicular adenopathy.      Left: No supraclavicular adenopathy.       Abdominal:      General: Bowel sounds are normal. There is no distension.   Musculoskeletal:      Thoracic back: Deformity (sits and stands with a slightly exaggerated thoracic kyphosis) present. No tenderness. Decreased range of motion.      Lumbar back: Tenderness (bilateral lumbar paraspinal muscle tenderness) present. No deformity. Decreased range of motion. Negative right straight leg raise test and negative left straight leg raise test.      Right lower leg: No edema.      Left lower leg: No edema.   Lymphadenopathy:      Head:      Right side of head: No submental, submandibular, tonsillar, preauricular, posterior auricular or occipital adenopathy.      Left side of head: No submental, submandibular, tonsillar, preauricular, posterior auricular or occipital adenopathy.      Cervical: No cervical adenopathy.      Upper Body:      Right upper body: No supraclavicular adenopathy.      Left upper body: No supraclavicular adenopathy.   Skin:     General: Skin is warm.      Coloration: Skin is not cyanotic, jaundiced or pale.      Findings: No rash.      Nails: There is no clubbing.   Neurological:      Mental Status: She is alert and oriented to person, place, and time.      Cranial Nerves: No cranial nerve deficit.      Motor: No tremor.      Coordination: Coordination normal.      Gait: Gait normal.   Psychiatric:         Attention and Perception: Attention normal.         Mood and Affect: Mood normal.         Speech: Speech normal.          Behavior: Behavior normal.         Thought Content: Thought content normal.       Assessment & Plan   Problems Addressed this Visit        Cardiac and Vasculature    Coronary artery calcification seen on CT scan  Reminded regarding risk factor modification with an emphasis on tobacco cessation.  Continue low-dose ASA    Essential hypertension   Hypertension: marginal. Evidence of target organ damage: coronary artery calcifications on previous imaging  Encouraged to continue to work on diet and exercise plan.   Continue current medication   If elevated at her return will modify her antihypertensive regimen    Mixed hyperlipidemia  As above.   Continue current medication.       Endocrine and Metabolic    Goiter    Prediabetes  As above.  Will continue to monitor       Gastrointestinal Abdominal     Gastroesophageal reflux disease without esophagitis    IBS (irritable bowel syndrome)       Genitourinary and Reproductive     Bladder instability    Encounter for screening mammogram for breast cancer    Relevant Orders    Mammo Screening Digital Tomosynthesis Bilateral With CAD    Pelvic relaxation       Health Encounters    Healthcare maintenance  Reviewed the potential benefits of a fourth dose of the Moderna COVID-19 vaccine.  Reminded that she is due for Shingrix  Will arrange an updated mammogram    Relevant Orders    Mammo Screening Digital Tomosynthesis Bilateral With CAD       Musculoskeletal and Injuries    Age-related osteoporosis without current pathological fracture  Encouraged to continue to pursue weight bearing activities while exercising joint protection.  Continue current medication  Relevant Medications   denosumab (PROLIA) 60 MG/ML solution prefilled syringe syringe       Mechanical back pain  Advised regarding rest, gentle exercise, ice and heat.  Plain films of the thoracic and lumbar spine arranged.  Will notify patient of the results  Encouraged report if any worse or if any new symptoms or concerns  in the meantime    Relevant Orders    XR Spine Lumbar 2 or 3 View    XR spine thoracic 3 vw    Primary osteoarthritis        Neuro   Migraine without aura and without status migrainosus, not intractable      Pulmonary and Pneumonias   COPD mixed type (HCC)   COPD is stable.  Reminded of the importance of smoking cessation  Encouraged to remain as active as symptoms allow for  Continue current medication      Symptoms and Signs   Elevated TSH  Will continue to monitor      Tobacco   Smoker              Diagnoses       Codes Comments    Dysuria    -  Primary ICD-10-CM: R30.0  ICD-9-CM: 788.1     Coronary artery calcification seen on CT scan     ICD-10-CM: I25.10  ICD-9-CM: 414.00     Essential hypertension     ICD-10-CM: I10  ICD-9-CM: 401.9     Mixed hyperlipidemia     ICD-10-CM: E78.2  ICD-9-CM: 272.2     Goiter     ICD-10-CM: E04.9  ICD-9-CM: 240.9     Prediabetes     ICD-10-CM: R73.03  ICD-9-CM: 790.29     Gastroesophageal reflux disease without esophagitis     ICD-10-CM: K21.9  ICD-9-CM: 530.81     Other irritable bowel syndrome     ICD-10-CM: K58.8  ICD-9-CM: 564.1     Bladder instability     ICD-10-CM: N32.89  ICD-9-CM: 596.89     Pelvic relaxation     ICD-10-CM: N81.89  ICD-9-CM: 618.89     Healthcare maintenance     ICD-10-CM: Z00.00  ICD-9-CM: V70.0     Age-related osteoporosis without current pathological fracture     ICD-10-CM: M81.0  ICD-9-CM: 733.01     Primary osteoarthritis involving multiple joints     ICD-10-CM: M15.9  ICD-9-CM: 715.98     Migraine without aura and without status migrainosus, not intractable     ICD-10-CM: G43.009  ICD-9-CM: 346.10     COPD mixed type (HCC)     ICD-10-CM: J44.9  ICD-9-CM: 496     Elevated TSH     ICD-10-CM: R79.89  ICD-9-CM: 794.5     Smoker     ICD-10-CM: F17.200  ICD-9-CM: 305.1     Mechanical back pain     ICD-10-CM: M54.9  ICD-9-CM: 724.5     Encounter for screening mammogram for breast cancer     ICD-10-CM: Z12.31  ICD-9-CM: V76.12

## 2022-07-25 ENCOUNTER — HOSPITAL ENCOUNTER (OUTPATIENT)
Dept: MAMMOGRAPHY | Facility: HOSPITAL | Age: 72
Discharge: HOME | End: 2022-07-25
Admitting: GENERAL PRACTICE

## 2022-07-25 DIAGNOSIS — Z12.31 ENCOUNTER FOR SCREENING MAMMOGRAM FOR BREAST CANCER: ICD-10-CM

## 2022-07-25 DIAGNOSIS — Z00.00 HEALTHCARE MAINTENANCE: ICD-10-CM

## 2022-07-25 PROCEDURE — 77067 SCR MAMMO BI INCL CAD: CPT

## 2022-07-25 PROCEDURE — 77063 BREAST TOMOSYNTHESIS BI: CPT | Performed by: RADIOLOGY

## 2022-07-25 PROCEDURE — 77063 BREAST TOMOSYNTHESIS BI: CPT

## 2022-07-25 PROCEDURE — 77067 SCR MAMMO BI INCL CAD: CPT | Performed by: RADIOLOGY

## 2022-09-27 ENCOUNTER — OFFICE VISIT (OUTPATIENT)
Dept: FAMILY MEDICINE CLINIC | Facility: CLINIC | Age: 72
End: 2022-09-27

## 2022-09-27 DIAGNOSIS — J44.9 CHRONIC OBSTRUCTIVE PULMONARY DISEASE, UNSPECIFIED COPD TYPE: ICD-10-CM

## 2022-09-27 DIAGNOSIS — Z00.00 HEALTHCARE MAINTENANCE: ICD-10-CM

## 2022-09-27 DIAGNOSIS — N81.89 PELVIC RELAXATION: ICD-10-CM

## 2022-09-27 DIAGNOSIS — F17.200 SMOKER: ICD-10-CM

## 2022-09-27 DIAGNOSIS — E04.9 GOITER: ICD-10-CM

## 2022-09-27 DIAGNOSIS — Z87.440 H/O RECURRENT URINARY TRACT INFECTION: ICD-10-CM

## 2022-09-27 DIAGNOSIS — Z23 ENCOUNTER FOR IMMUNIZATION: ICD-10-CM

## 2022-09-27 DIAGNOSIS — G43.009 MIGRAINE WITHOUT AURA AND WITHOUT STATUS MIGRAINOSUS, NOT INTRACTABLE: ICD-10-CM

## 2022-09-27 DIAGNOSIS — N39.0 RECURRENT UTI: ICD-10-CM

## 2022-09-27 DIAGNOSIS — R73.03 PREDIABETES: ICD-10-CM

## 2022-09-27 DIAGNOSIS — E78.2 MIXED HYPERLIPIDEMIA: ICD-10-CM

## 2022-09-27 DIAGNOSIS — B37.0 ORAL CANDIDIASIS: ICD-10-CM

## 2022-09-27 DIAGNOSIS — I25.10 CORONARY ARTERY CALCIFICATION SEEN ON CT SCAN: Primary | ICD-10-CM

## 2022-09-27 DIAGNOSIS — K21.9 GASTROESOPHAGEAL REFLUX DISEASE WITHOUT ESOPHAGITIS: ICD-10-CM

## 2022-09-27 DIAGNOSIS — M81.0 AGE-RELATED OSTEOPOROSIS WITHOUT CURRENT PATHOLOGICAL FRACTURE: ICD-10-CM

## 2022-09-27 DIAGNOSIS — N32.89 BLADDER INSTABILITY: ICD-10-CM

## 2022-09-27 DIAGNOSIS — R79.89 ELEVATED TSH: ICD-10-CM

## 2022-09-27 DIAGNOSIS — M15.9 PRIMARY OSTEOARTHRITIS INVOLVING MULTIPLE JOINTS: ICD-10-CM

## 2022-09-27 DIAGNOSIS — I10 ESSENTIAL HYPERTENSION: ICD-10-CM

## 2022-09-27 DIAGNOSIS — K58.8 OTHER IRRITABLE BOWEL SYNDROME: ICD-10-CM

## 2022-09-27 DIAGNOSIS — R42 VERTIGO: ICD-10-CM

## 2022-09-27 DIAGNOSIS — J44.9 COPD MIXED TYPE: ICD-10-CM

## 2022-09-27 PROCEDURE — 90686 IIV4 VACC NO PRSV 0.5 ML IM: CPT | Performed by: GENERAL PRACTICE

## 2022-09-27 PROCEDURE — 1170F FXNL STATUS ASSESSED: CPT | Performed by: GENERAL PRACTICE

## 2022-09-27 PROCEDURE — 96160 PT-FOCUSED HLTH RISK ASSMT: CPT | Performed by: GENERAL PRACTICE

## 2022-09-27 PROCEDURE — G0439 PPPS, SUBSEQ VISIT: HCPCS | Performed by: GENERAL PRACTICE

## 2022-09-27 PROCEDURE — 1159F MED LIST DOCD IN RCRD: CPT | Performed by: GENERAL PRACTICE

## 2022-09-27 PROCEDURE — G0008 ADMIN INFLUENZA VIRUS VAC: HCPCS | Performed by: GENERAL PRACTICE

## 2022-09-27 RX ORDER — FLUCONAZOLE 150 MG/1
150 TABLET ORAL DAILY PRN
Qty: 5 TABLET | Refills: 0 | Status: SHIPPED | OUTPATIENT
Start: 2022-09-27 | End: 2023-02-01

## 2022-09-27 NOTE — PROGRESS NOTES
The ABCs of the Annual Wellness Visit  Subsequent Medicare Wellness Visit    Chief Complaint  Subsequent Medicare Wellness Visit    Subjective    History of Present Illness:  Albino Estrella is a 71 y.o. female who presents for a Subsequent Medicare Wellness Visit.    COPD  She continues to experience intermittent SOB, cough and wheezing.  There is no history of any chest pain or hemoptysis and she continues to deny any fever, chills, or night sweats. She remains on trelegy and feels that it has helped considerably. She continues to use nebulized albuterol 4 times daily. She continues to smoke 1 pack per day. Low dose CT of the chest performed on 1/14/2022 was reported as showing a benign appearing stable 14.8 mm pleural-based RLL nodule, moderate COPD, and moderate coronary artery calcifications.  A one year follow-up was recommended    Essential Hypertension  Home blood pressure readings: not doing. Associated signs and symptoms: dyspnea.  She continues to deny any chest pain, palpitations, orthopnea, paroxysmal nocturnal dyspnea or peripheral edema. Current antihypertensive medications includes diltiazem and spironolactone.     Hyperlipidemia  Her compliance with treatment has been fair.  She remains fairly active about the house and is taking atorvastatin with no apparent side effects.      Chronic Low Back Pain  She feels her back pain has returned to baseline.  With this she decided not to follow-up with the x-rays arranged at her last visit.  The pain is described as a bilateral ache.  This does not radiate elsewhere and remains unassociated with any other symptoms.  She continues to deny any changes in her strength, sensation, or bowel/bladder control and there is no history of any fever, chills, or night sweats.  The pain is worse with prolonged standing and improves with rest or change of position.    Gastroesophageal Reflux Disease  She remains heartburn free on pantoprazole.  She continues to deny any  difficulty swallowing, regurgitation, vomiting, abdominal pain, change in her bowel habits, hematochezia, or melena.    Pelvic Pressure  She continues to experience pelvic pressure with prolonged standing, pushing, pulling, or lifting. This is associated with urinary frequency, urgency, dysuria, and occasional incontinence. She continues to deny any hematuria, vaginal bleeding or discharge, change in her bowel habits, hematochezia, melena, fever or chills. She has a history of recurrent urinary tract infections.  She was tried on tolterodine but stopped it due to a lack of effect and increased fatigue.  Underwent a gynecology assessment with no significant abnormalities found on history or physical exam.  She was offered a cystoscopy but decided to defer this    The following portions of the patient's history were reviewed and   updated as appropriate: allergies, current medications, past family history, past medical history, past social history, past surgical history and problem list.    Compared to one year ago, the patient feels her physical   health is the same.    Compared to one year ago, the patient feels her mental   health is the same.    Recent Hospitalizations:  She was not admitted to the hospital during the last year.     Current Medical Providers:  Patient Care Team:  Stan Alvarado MD as PCP - General  Stan Alvarado MD as PCP - Family Medicine    Outpatient Medications Prior to Visit   Medication Sig Dispense Refill   • denosumab (PROLIA) 60 MG/ML solution prefilled syringe syringe Inject 1 mL under the skin into the appropriate area as directed Every 6 (Six) Months. 1 each 5   • Zoster Vac Recomb Adjuvanted (Shingrix) 50 MCG/0.5ML reconstituted suspension Inject 0.5 mL into the appropriate muscle as directed by prescriber See Admin Instructions. Repeat in 2-6 months 1 each 1   • aspirin 81 MG EC tablet Take 1 tablet by mouth Daily. 30 tablet 5   • atorvastatin (LIPITOR) 40 MG  tablet TAKE 1 TABLET BY MOUTH EVERY NIGHT AT BEDTIME 30 tablet 5   • dilTIAZem (TIAZAC) 360 MG 24 hr capsule Take 1 capsule by mouth Daily. 30 capsule 5   • estradiol (ESTRACE) 0.1 MG/GM vaginal cream Insert 2 g into the vagina 2 (Two) Times a Week. 20 g 5   • fluconazole (DIFLUCAN) 150 MG tablet Take 1 tablet by mouth Daily As Needed (yeast infe). 3 tablet 0   • Fluticasone-Umeclidin-Vilant (Trelegy Ellipta) 100-62.5-25 MCG/INH inhaler Inhale 1 puff Daily. 60 each 5   • ipratropium-albuterol (DUO-NEB) 0.5-2.5 mg/3 ml nebulizer Take 3 mL by nebulization 4 (Four) Times a Day As Needed for Wheezing or Shortness of Air. 540 mL 5   • meclizine (Antivert) 25 MG tablet Take 1 tablet by mouth 3 (Three) Times a Day As Needed for Dizziness. 90 tablet 5   • montelukast (SINGULAIR) 10 MG tablet TAKE 1 TABLET BY MOUTH EVERY NIGHT 30 tablet 5   • pantoprazole (PROTONIX) 40 MG EC tablet TAKE 1 TABLET BY MOUTH TWICE DAILY 60 tablet 5   • spironolactone (ALDACTONE) 25 MG tablet TAKE 1 TABLET BY MOUTH EVERY MORNING 30 tablet 5   • SUMAtriptan (IMITREX) 100 MG tablet Take one tablet at onset of headache. May repeat dose one time in 2 hours if headache not relieved. 9 tablet 5   • vitamin B-12 (CYANOCOBALAMIN) 1000 MCG tablet Take 1 tablet by mouth Daily. 30 tablet 5     No facility-administered medications prior to visit.     No opioid medication identified on active medication list. I have reviewed chart for other potential  high risk medication/s and harmful drug interactions in the elderly.          Aspirin is on active medication list. Aspirin use is indicated based on review of current medical condition/s. Pros and cons of this therapy have been discussed today. Benefits of this medication outweigh potential harm.  Patient has been encouraged to continue taking this medication.  .    Patient Active Problem List   Diagnosis   • Bladder instability   • COPD mixed type (HCC)   • Dyspareunia   • Gastroesophageal reflux disease  without esophagitis   • H/O recurrent urinary tract infection   • Mixed hyperlipidemia   • Essential hypertension   • IBS (irritable bowel syndrome)   • Prediabetes   • Primary osteoarthritis   • Migraine without aura and without status migrainosus, not intractable   • Encounter for immunization   • Healthcare maintenance   • Smoker   • Oral candidiasis   • Pelvic relaxation   • Age-related osteoporosis without current pathological fracture   • Goiter   • Elevated TSH   • Coronary artery calcification seen on CT scan   • Mechanical back pain   • Encounter for screening mammogram for breast cancer     Advance Care Planning  Advance Directive is not on file.  ACP discussion was held with the patient during this visit. Patient does not have an advance directive, information provided.    Review of Systems   Constitutional: Positive for fatigue. Negative for appetite change, chills and fever.   HENT: Positive for congestion, rhinorrhea and sore throat (last few days). Negative for ear pain, postnasal drip, sneezing and voice change.    Eyes: Negative for visual disturbance.   Respiratory: Positive for cough, shortness of breath and wheezing.    Cardiovascular: Negative for chest pain, palpitations and leg swelling.   Gastrointestinal: Negative for abdominal pain, blood in stool, constipation, diarrhea, nausea and vomiting.   Genitourinary: Positive for frequency, pelvic pain (pressure) and urgency. Negative for dysuria and hematuria.   Musculoskeletal: Positive for back pain. Negative for arthralgias, joint swelling, myalgias and neck pain.   Skin: Negative for rash.   Neurological: Positive for dizziness. Negative for weakness and numbness.   Psychiatric/Behavioral: Negative for dysphoric mood and sleep disturbance. The patient is not nervous/anxious.         Objective    Vitals:    09/27/22 1510   BP: 126/70   Pulse: 72   Resp: 14   Temp: 98.2 °F (36.8 °C)   TempSrc: Temporal   SpO2: 97%   Weight: 48.1 kg (106 lb)  "  Height: 154.9 cm (61\")     Estimated body mass index is 20.03 kg/m² as calculated from the following:    Height as of this encounter: 154.9 cm (61\").    Weight as of this encounter: 48.1 kg (106 lb).    BMI is within normal parameters. No other follow-up for BMI required.      Does the patient have evidence of cognitive impairment? No    Physical Exam  Constitutional:       General: She is not in acute distress.     Appearance: Normal appearance. She is well-developed. She is not diaphoretic.      Comments: Accompanied by her . Bright and in fair spirits. No apparent distress. No pallor, jaundice, diaphoresis, or cyanosis.   HENT:      Head: Atraumatic.      Right Ear: Tympanic membrane, ear canal and external ear normal.      Left Ear: Tympanic membrane, ear canal and external ear normal.      Mouth/Throat:      Lips: No lesions.      Pharynx: Oropharyngeal exudate (few white plaques with erythematous base about the soft palate) present. No posterior oropharyngeal erythema.   Eyes:      Conjunctiva/sclera: Conjunctivae normal.   Neck:      Thyroid: Thyromegaly (right lobe more prominent then the left) present. No thyroid mass.      Vascular: No carotid bruit or JVD.      Trachea: Trachea normal. No tracheal deviation.   Cardiovascular:      Rate and Rhythm: Normal rate and regular rhythm.      Heart sounds: Normal heart sounds, S1 normal and S2 normal. No murmur heard.    No gallop.   Pulmonary:      Effort: Pulmonary effort is normal.      Breath sounds: Examination of the right-lower field reveals decreased breath sounds. Examination of the left-lower field reveals decreased breath sounds. Decreased breath sounds and wheezing (diffuse - mild) present. No rales.      Comments: Pulmonary hyperinflation  Chest:   Breasts:      Right: No supraclavicular adenopathy.      Left: No supraclavicular adenopathy.       Abdominal:      General: Bowel sounds are normal. There is no distension or abdominal bruit.      " Palpations: Abdomen is soft. There is no hepatomegaly, splenomegaly or mass.      Tenderness: There is no abdominal tenderness.      Hernia: No hernia is present.   Musculoskeletal:      Right lower leg: No edema.      Left lower leg: No edema.   Lymphadenopathy:      Head:      Right side of head: No submental, submandibular, tonsillar, preauricular, posterior auricular or occipital adenopathy.      Left side of head: No submental, submandibular, tonsillar, preauricular, posterior auricular or occipital adenopathy.      Cervical: No cervical adenopathy.      Upper Body:      Right upper body: No supraclavicular adenopathy.      Left upper body: No supraclavicular adenopathy.   Skin:     General: Skin is warm.      Coloration: Skin is not cyanotic, jaundiced or pale.      Findings: No rash.      Nails: There is no clubbing.   Neurological:      Mental Status: She is alert and oriented to person, place, and time.      Cranial Nerves: No cranial nerve deficit.      Motor: No tremor.      Coordination: Coordination normal.      Gait: Gait normal.   Psychiatric:         Attention and Perception: Attention normal.         Mood and Affect: Mood normal.         Speech: Speech normal.         Behavior: Behavior normal.         Thought Content: Thought content normal.       HEALTH RISK ASSESSMENT    Smoking Status:  Social History     Tobacco Use   Smoking Status Current Every Day Smoker   • Packs/day: 1.00   • Types: Cigarettes   Smokeless Tobacco Never Used     Alcohol Consumption:  Social History     Substance and Sexual Activity   Alcohol Use No     Fall Risk Screen:  STEADI Fall Risk Assessment was completed, and patient is at LOW risk for falls.Assessment completed on:9/27/2022    Depression Screening:  PHQ-2/PHQ-9 Depression Screening 9/27/2022   Retired PHQ-9 Total Score -   Retired Total Score -   Little Interest or Pleasure in Doing Things 0-->not at all   Feeling Down, Depressed or Hopeless 0-->not at all   PHQ-9:  Brief Depression Severity Measure Score 0     Health Habits and Functional and Cognitive Screening:  Functional & Cognitive Status 9/27/2022   Do you have difficulty preparing food and eating? No   Do you have difficulty bathing yourself, getting dressed or grooming yourself? No   Do you have difficulty using the toilet? No   Do you have difficulty moving around from place to place? No   Do you have trouble with steps or getting out of a bed or a chair? No   Current Diet Well Balanced Diet   Dental Exam Not up to date   Eye Exam Not up to date   Exercise (times per week) 7 times per week   Current Exercises Include Walking   Current Exercise Activities Include -   Do you need help using the phone?  No   Are you deaf or do you have serious difficulty hearing?  No   Do you need help with transportation? No   Do you need help shopping? No   Do you need help preparing meals?  No   Do you need help with housework?  No   Do you need help with laundry? No   Do you need help taking your medications? No   Do you need help managing money? No   Do you ever drive or ride in a car without wearing a seat belt? No   Have you felt unusual stress, anger or loneliness in the last month? No   Who do you live with? Spouse   If you need help, do you have trouble finding someone available to you? No   Have you been bothered in the last four weeks by sexual problems? No   Do you have difficulty concentrating, remembering or making decisions? No     Age-appropriate Screening Schedule:  Refer to the list below for future screening recommendations based on patient's age, sex and/or medical conditions. Orders for these recommended tests are listed in the plan section. The patient has been provided with a written plan.    Health Maintenance   Topic Date Due   • ZOSTER VACCINE (1 of 2) Never done   • PAP SMEAR  06/13/2017   • INFLUENZA VACCINE  10/01/2022   • DXA SCAN  11/19/2022   • LIPID PANEL  03/18/2023   • MAMMOGRAM  07/25/2024   • TDAP/TD  VACCINES (2 - Td or Tdap) 09/22/2024        Assessment & Plan   CMS Preventative Services Quick Reference  Risk Factors Identified During Encounter  Cardiovascular Disease  Chronic Pain   Immunizations Discussed/Encouraged (specific Immunizations; Influenza, Shingrix and COVID19  Tobacco Use/Dependance (use dotphrase .tobaccocessation for documentation)  The above risks/problems have been discussed with the patient.  Follow up actions/plans if indicated are seen below in the Assessment/Plan Section.  Pertinent information has been shared with the patient in the After Visit Summary.    Diagnoses and all orders for this visit:    1. Coronary artery calcification seen on CT scan   Reminded regarding the importance of risk factor modification.  Continue low-dose ASA    2. Essential hypertension   Hypertension: at goal. Evidence of target organ damage: coronary artery calcifications on previous imaging.  Encouraged to continue to work on diet and exercise plan.   Continue current medication  -     spironolactone (ALDACTONE) 25 MG tablet; Take 1 tablet by mouth Every Morning.  Dispense: 30 tablet; Refill: 5  -     dilTIAZem (TIAZAC) 360 MG 24 hr capsule; Take 1 capsule by mouth Daily.  Dispense: 30 capsule; Refill: 5  -     aspirin 81 MG EC tablet; Take 1 tablet by mouth Daily.  Dispense: 30 tablet; Refill: 5    3. Mixed hyperlipidemia  As above.   Continue current medication.  Updated labs will be drawn at her return.  -     atorvastatin (LIPITOR) 40 MG tablet; Take 1 tablet by mouth every night at bedtime.  Dispense: 30 tablet; Refill: 5    4. Goiter    5. Prediabetes  As above.  Will continue to monitor    6. Gastroesophageal reflux disease without esophagitis  -     pantoprazole (PROTONIX) 40 MG EC tablet; Take 1 tablet by mouth 2 (Two) Times a Day.  Dispense: 60 tablet; Refill: 5    7. Other irritable bowel syndrome    8. Bladder instability    9. H/O recurrent urinary tract infection    10. Pelvic relaxation    11.  Healthcare maintenance  Flu shot administered.  Encouraged to obtain an updated bivalent COVID-19 vaccination.  Reminded that she is due for Shingrix  We will arrange an updated low-dose CT of the chest at her return  -     FluLaval/Fluarix/Fluzone >6 Months  -     vitamin B-12 (CYANOCOBALAMIN) 1000 MCG tablet; Take 1 tablet by mouth Daily.  Dispense: 30 tablet; Refill: 5    12. Age-related osteoporosis without current pathological fracture    13. Primary osteoarthritis involving multiple joints    14. Migraine without aura and without status migrainosus, not intractable  -     SUMAtriptan (IMITREX) 100 MG tablet; Take one tablet at onset of headache. May repeat dose one time in 2 hours if headache not relieved.  Dispense: 9 tablet; Refill: 5    15. COPD mixed type (HCC)   COPD is stable.  Reminded of the importance of smoking cessation  Encouraged to remain as active as symptoms allow for  Continue current medication  -     Fluticasone-Umeclidin-Vilant (Trelegy Ellipta) 100-62.5-25 MCG/INH inhaler; Inhale 1 puff Daily.  Dispense: 60 each; Refill: 5    16. Elevated TSH  Will continue to monitor     17. Smoker  Lengthy discussion regarding the potential sequela of continued tobacco use and the options with respect to cessation.  Patient uninterested in pursuing at present but will consider.    18. Oral candidiasis  Reminded to rinse her mouth out after each use of  trelegy  Fluconazole  Encouraged to report if any worse, any new symptoms, or if not resolving over the next several days  -     fluconazole (DIFLUCAN) 150 MG tablet; Take 1 tablet by mouth Daily As Needed (yeast infe).  Dispense: 5 tablet; Refill: 0    Follow Up:   Return in about 4 months (around 1/16/2023) for Should be fasting at return.     An After Visit Summary and PPPS were made available to the patient.

## 2022-09-27 NOTE — PATIENT INSTRUCTIONS
Advance Directive  Advance directives are legal documents that allow you to make decisions about your health care and medical treatment in case you become unable to communicate for yourself. Advance directives let your wishes be known to family, friends, and health care providers.  Discussing and writing advance directives should happen over time rather than all at once. Advance directives can be changed and updated at any time. There are different types of advance directives, such as:  Medical power of .  Living will.  Do not resuscitate (DNR) order or do not attempt resuscitation (DNAR) order.  Health care proxy and medical power of   A health care proxy is also called a health care agent. This person is appointed to make medical decisions for you when you are unable to make decisions for yourself. Generally, people ask a trusted friend or family member to act as their proxy and represent their preferences. Make sure you have an agreement with your trusted person to act as your proxy. A proxy may have to make a medical decision on your behalf if your wishes are not known.  A medical power of , also called a durable power of  for health care, is a legal document that names your health care proxy. Depending on the laws in your state, the document may need to be:  Signed.  Notarized.  Dated.  Copied.  Witnessed.  Incorporated into your medical record.  You may also want to appoint a trusted person to manage your money in the event you are unable to do so. This is called a durable power of  for finances. It is a separate legal document from the durable power of  for health care. You may choose your health care proxy or someone different to act as your agent in money matters.  If you do not appoint a proxy, or there is a concern that the proxy is not acting in your best interest, a court may appoint a guardian to act on your behalf.  Living will  A living will is a set of  instructions that state your wishes about medical care when you cannot express them yourself. Health care providers should keep a copy of your living will in your medical record. You may want to give a copy to family members or friends. To alert caregivers in case of an emergency, you can place a card in your wallet to let them know that you have a living will and where they can find it. A living will is used if you become:  Terminally ill.  Disabled.  Unable to communicate or make decisions.  The following decisions should be included in your living will:  To use or not to use life support equipment, such as dialysis machines and breathing machines (ventilators).  Whether you want a DNR or DNAR order. This tells health care providers not to use cardiopulmonary resuscitation (CPR) if breathing or heartbeat stops.  To use or not to use tube feeding.  To be given or not to be given food and fluids.  Whether you want comfort (palliative) care when the goal becomes comfort rather than a cure.  Whether you want to donate your organs and tissues.  A living will does not give instructions for distributing your money and property if you should pass away.  DNR or DNAR  A DNR or DNAR order is a request not to have CPR in the event that your heart stops beating or you stop breathing. If a DNR or DNAR order has not been made and shared, a health care provider will try to help any patient whose heart has stopped or who has stopped breathing. If you plan to have surgery, talk with your health care provider about how your DNR or DNAR order will be followed if problems occur.  What if I do not have an advance directive?  Some states assign family decision makers to act on your behalf if you do not have an advance directive. Each state has its own laws about advance directives. You may want to check with your health care provider, , or state representative about the laws in your state.  Summary  Advance directives are legal  documents that allow you to make decisions about your health care and medical treatment in case you become unable to communicate for yourself.  The process of discussing and writing advance directives should happen over time. You can change and update advance directives at any time.  Advance directives may include a medical power of , a living will, and a DNR or DNAR order.  This information is not intended to replace advice given to you by your health care provider. Make sure you discuss any questions you have with your health care provider.  Document Revised: 09/21/2021 Document Reviewed: 09/21/2021  [employer] Patient Education © 2022 [employer]  Heart Disease Prevention  Heart disease is the leading cause of death in the world. Coronary artery disease is the most common cause of heart disease. This condition results when cholesterol and other substances (plaque) build up inside the walls of the blood vessels that supply your heart muscle (arteries). This buildup in arteries is called atherosclerosis. You can take actions to lower your risk of heart disease.  How can heart disease affect me?  Heart disease can cause many unpleasant symptoms and complications, such as:  Chest pain (angina).  Reduced or blocked blood flow to your heart. This can cause:  Irregular heartbeats (arrhythmias).  Heart attack.  Heart failure.  What can increase my risk?  The following factors may make you more likely to develop this condition:  High blood pressure (hypertension).  High cholesterol.  Smoking.  A diet high in saturated fats or trans fats.  Lack of physical activity.  Obesity.  Drinking too much alcohol.  Diabetes.  Having a family history of heart disease.  What actions can I take to prevent heart disease?  Nutrition    Eat a heart-healthy eating plan as told by your health care provider. Examples include the DASH (Dietary Approaches to Stop Hypertension) eating plan or the Mediterranean diet.  Generally, it is  recommended that you:  Eat less salt (sodium). Ask your health care provider how much sodium is safe for you. Most people should have less than 2,300 mg each day.  Limit unhealthy fats, such as saturated and trans fats, in your diet. You can do this by eating low-fat dairy products, eating less red meat, and avoiding processed foods.  Eat healthy fats (omega-3 fatty acids). These are found in fish, such as mackerel or salmon.  Eat more fruits and vegetables. You should try to fill one-half of your plate with fruits and vegetables at each meal.  Eat more whole grains.  Avoid foods and drinks that have added sugars.  Lifestyle    Get regular exercise. This is one of the most important things you can do for your health. Generally, it is recommended that you:  Exercise for at least 30 minutes on most days of the week (150 minutes each week). The exercise should increase your heart rate and make you sweat (aerobic exercise).  Add strength exercises on at least 2 days each week.  Do not use any products that contain nicotine or tobacco, such as cigarettes and e-cigarettes. These can damage your heart and blood vessels. If you need help quitting, ask your health care provider.  Alcohol use  Do not drink alcohol if:  Your health care provider tells you not to drink.  You are pregnant, may be pregnant, or are planning to become pregnant.  If you drink alcohol, limit how much you have:  0-1 drink a day for women.  0-2 drinks a day for men.  Be aware of how much alcohol is in your drink. In the [place] one drink equals one typical bottle of beer (12 oz), one-half glass of wine (5 oz), or one shot of hard liquor (1½ oz).  Medicines  Take over-the-counter and prescription medicines only as told by your health care provider.  Ask your health care provider whether you should take an aspirin every day. Taking aspirin may help reduce your risk of heart disease and stroke.  Depending on your risk factors, your health care provider may  prescribe medicines to lower your risk of heart disease or to control related conditions. You may take medicine to:  Lower cholesterol.  Control blood pressure.  Control diabetes.  General information  Keep your blood pressure under control, as recommended by your health care provider. For most healthy people, the upper number of your blood pressure (systolic) should be no higher than 120, and the lower number (diastolic) no higher than 80. Treatment may be needed if your blood pressure is higher than 130/80.  Have your blood pressure checked at least every two years. Your health care provider may check your blood pressure more often if you have high blood pressure.  After age 20, have your cholesterol checked every 4-6 years. If you have risk factors for heart disease, you may need to have it checked more frequently. Treatment may be needed if your cholesterol is high.  Have your body mass index (BMI) checked every year. Your health care provider can calculate your BMI from your height and weight.  Work with your health care provider to lose weight, if needed, or to maintain a healthy weight.  Where to find more information:  Centers for Disease Control and Prevention: www.cdc.gov/heartdisease  American Heart Association: www.heart.org  Take a free online heart disease risk quiz to better understand your personal risk factors.  Summary  Heart disease is the leading cause of death in the world.  Heart disease can cause chest pain, abnormal heart rhythms, heart attack, and heart failure.  High blood pressure, high cholesterol, and smoking are the main risk factors for heart disease, although other factors also contribute.  You can take actions to lower your chances of developing heart disease. Work with your health care provider to reduce your risk by following a heart-healthy diet, being physically active, and controlling your weight, blood pressure, and cholesterol level.  This information is not intended to  replace advice given to you by your health care provider. Make sure you discuss any questions you have with your health care provider.  Document Revised: 01/02/2019 Document Reviewed: 01/02/2019  [employer] Patient Education © 2022 [employer]  Mammogram  A mammogram is an X-ray of the breasts. This procedure can screen for and detect any changes that may indicate breast cancer. Mammograms are regularly done beginning at age 40 for women with average risk. A man may have a mammogram if he has a lump or swelling in his breast tissue.  A mammogram can also identify other changes and variations in the breast, such as:  Inflammation of the breast tissue (mastitis).  An infected area that contains a collection of pus (abscess).  A fluid-filled sac (cyst).  Tumors that are not cancerous (benign).  Fibrocystic changes. This is when breast tissue becomes denser and can make the tissue feel rope-like or uneven under the skin.  Women at higher risk for breast cancer need earlier and more comprehensive screening for abnormal changes. Breast tomosynthesis, or three-dimensional (3D) mammography, and digital breast tomosynthesis are advanced forms of imaging that create 3D pictures of the breasts.  Tell a health care provider:  About any allergies you have.  If you have breast implants.  If you have had previous breast disease, biopsy, or surgery.  If you have a family history of breast cancer.  If you are breastfeeding.  Whether you are pregnant or may be pregnant.  What are the risks?  Generally, this is a safe procedure. However, problems may occur, including:  Exposure to radiation. Radiation levels are very low with this test.  The need for more tests.  The mammogram fails to detect certain cancers or the results are misinterpreted.  Difficulty with detecting breast cancer in women with dense breasts.  What happens before the procedure?  Schedule your test about 1-2 weeks after your menstrual period if you are menstruating.  This is usually when your breasts are the least tender.  If you have had a mammogram done at a different facility in the past, get the mammogram X-rays or have them sent to your current exam facility. The new and old images will be compared.  Wash your breasts and underarms on the day of the test.  Do not wear deodorants, perfumes, lotions, or powders anywhere on your body on the day of the test.  Remove any jewelry from your neck.  Wear clothes that you can change into and out of easily.  What happens during the procedure?    You will undress from the waist up and put on a gown that opens in the front.  You will  front of the X-ray machine.  Each breast will be placed between two plastic or glass plates. The plates will compress your breast for a few seconds. Try to stay as relaxed as possible. This procedure does not cause any harm to your breasts. Any discomfort you feel will be very brief.  X-rays will be taken from different angles of each breast.  The procedure may vary among health care providers and hospitals.  What can I expect after the procedure?  The mammogram will be examined by a specialist (radiologist).  You may need to repeat certain parts of the test, depending on the quality of the images. This is done if the radiologist needs a better view of the breast tissue.  You may resume your normal activities.  It is up to you to get the results of your procedure. Ask your health care provider, or the department that is doing the procedure, when your results will be ready.  Summary  A mammogram is an X-ray of the breasts. This procedure can screen for and detect any changes that may indicate breast cancer.  A man may have a mammogram if he has a lump or swelling in his breast tissue.  If you have had a mammogram done at a different facility in the past, get the mammogram X-rays or have them sent to your current exam facility in order to compare them.  Schedule your test about 1-2 weeks after your  menstrual period if you are menstruating.  Ask when your test results will be ready. Make sure you get your test results.  This information is not intended to replace advice given to you by your health care provider. Make sure you discuss any questions you have with your health care provider.  Document Revised: 10/18/2021 Document Reviewed: 10/18/2021  [employer] Patient Education © 2022 [employer]  Colorectal Cancer Screening  Colorectal cancer screening is a group of tests that are used to check for colorectal cancer before symptoms develop. Colorectal refers to the colon and rectum. The colon and rectum are located at the end of the digestive tract and carry stool (feces) out of the body.  Who should have screening?  All adults who are 45-75 years old should have screening. Your health care provider may recommend screening before age 45. You will have tests every 1-10 years, depending on your results and the type of screening test. Screening recommendations for adults who are 76-85 years old vary depending on a person's health. People older than age 85 should no longer get colorectal cancer screening.  You may have screening tests starting before age 45, or more often than other people, if you have any of these risk factors:  A personal or family history of colorectal cancer or abnormal growths known as polyps in your colon.  Inflammatory bowel disease, such as ulcerative colitis or Crohn's disease.  A history of having radiation treatment to the abdomen or the area between the hip bones (pelvic area) for cancer.  A type of genetic syndrome that is passed from parent to child (hereditary), such as:  Poole syndrome.  Familial adenomatous polyposis.  Turcot syndrome.  Peutz-Jeghers syndrome.  MUTYH-associated polyposis (MAP).  A personal history of diabetes.  Types of tests  There are several types of colorectal screening tests. You may have one or more of the following:  Guaiac-based fecal occult blood testing.  For this test, a stool sample is checked for hidden (occult) blood, which could be a sign of colorectal cancer.  Fecal immunochemical test (FIT). For this test, a stool sample is checked for blood, which could be a sign of colorectal cancer.  Stool DNA test. For this test, a stool sample is checked for blood and changes in DNA that could lead to colorectal cancer.  Sigmoidoscopy. During this test, a thin, flexible tube with a camera on the end, called a sigmoidoscope, is used to examine the rectum and the lower colon.  Colonoscopy. During this test, a long, flexible tube with a camera on the end, called a colonoscope, is used to examine the entire colon and rectum. Also, sometimes a tissue sample is taken to be looked at under a microscope (biopsy) or small polyps are removed during this test.  Virtual colonoscopy. Instead of a colonoscope, this type of colonoscopy uses a CT scan to take pictures of the colon and rectum. A CT scan is a type of X-ray that is made using computers.  What are the benefits of screening?  Screening reduces your risk for colorectal cancer and can help identify cancer at an early stage, when the cancer can be removed or treated more easily. It is common for polyps to form in the lining of the colon, especially as you age. These polyps may be cancerous or become cancerous over time. Screening can identify these polyps.  What are the risks of screening?  Generally, these are safe tests. However, problems may occur, including:  The need for more tests to confirm results from a stool sample test. Stool sample tests have fewer risks than other types of screening tests.  Being exposed to low levels of radiation, if you had a test involving X-rays. This may slightly increase your cancer risk. The benefit of detecting cancer outweighs the slight increase in risk.  Bleeding, damage to the intestine, or infection caused by a sigmoidoscopy or colonoscopy.  A reaction to medicines given during a  sigmoidoscopy or colonoscopy.  Talk with your health care provider to understand your risk for colorectal cancer and to make a screening plan that is right for you.  Questions to ask your health care provider  When should I start colorectal cancer screening?  What is my risk for colorectal cancer?  How often do I need screening?  Which screening tests do I need?  How do I get my test results?  What do my results mean?  Where to find more information  Learn more about colorectal cancer screening from:  The American Cancer Society: cancer.org  National Cancer [place]: cancer.gov  Summary  Colorectal cancer screening is a group of tests used to check for colorectal cancer before symptoms develop.  All adults who are 45-75 years old should have screening. Your health care provider may recommend screening before age 45.  You may have screening tests starting before age 45, or more often than other people, if you have certain risk factors.  Screening reduces your risk for colorectal cancer and can help identify cancer at an early stage, when the cancer can be removed or treated more easily.  Talk with your health care provider to understand your risk for colorectal cancer and to make a screening plan that is right for you.  This information is not intended to replace advice given to you by your health care provider. Make sure you discuss any questions you have with your health care provider.  Document Revised: 04/07/2021 Document Reviewed: 04/07/2021  [employer] Patient Education © 2022 [employer]  Lung Cancer Screening  A lung cancer screening is a test that checks for lung cancer when there are no symptoms or history of that disease. The screening is done to look for lung cancer in its very early stages. Finding cancer early improves the chances of successful treatment. It may save your life.  Who should have a screening?  You should be screened for lung cancer if all of these apply:  You currently smoke, or you have  quit smoking within the past 15 years.  You are between the ages of 50 and 80 years old. Screening may be recommended up to age 80 depending on your overall health and other factors.  You have a smoking history of 1 pack of cigarettes a day for 20 years or 2 packs a day for 10 years.  How is screening done?  The recommended screening test is a low-dose computed tomography (LDCT) scan. This scan takes detailed images of the lungs. This allows a health care provider to look for abnormal cells. If you are at risk for lung cancer, it is recommended that you get screened once a year. Talk to your health care provider about the risks, benefits, and limitations of screening.  What are the benefits of screening?  Screening can find lung cancer early, before symptoms start and before it has spread outside of the lungs. The chances of curing lung cancer are greater if the cancer is diagnosed early.  What are the risks of screening?  The screening may show lung cancer when no cancer is present. Talk with your health care provider about what your results mean. In some cases, your health care provider may do more testing to confirm the results.  The screening may not find lung cancer when it is present.  You will be exposed to radiation from repeated LDCT tests, which can cause cancer in otherwise healthy people.  How can I lower my risk of lung cancer?  Make these lifestyle changes to lower your risk of developing lung cancer:  Do not use any products that contain nicotine or tobacco. These products include cigarettes, chewing tobacco, and vaping devices, such as e-cigarettes. If you need help quitting, ask your health care provider.  Avoid secondhand smoke.  Avoid exposure to radiation.  Avoid exposure to radon gas. Have your home checked for radon regularly.  Avoid things that cause cancer (carcinogens).  Avoid living or working in places with high air pollution or diesel exhaust.  Questions to ask your health care  provider  Am I eligible for lung cancer screening?  Does my health insurance cover the cost of lung cancer screening?  What happens if the lung cancer screening shows something of concern?  How soon will I have results from my lung cancer screening?  Is there anything that I need to do to prepare for my lung cancer screening?  What happens if I decide not to have lung cancer screening?  Where to find more information  Ask your health care provider about the risks and benefits of screening. More information and resources are available from these organizations:  American Cancer Society (ACS): www.cancer.org  American Lung Association: www.lung.org  National Cancer [place]: www.cancer.gov  Contact a health care provider if:  You start to show symptoms of lung cancer, including:  A cough that will not go away.  High-pitched whistling sounds when you breathe, most often when you breathe out (wheezing).  Chest pain.  Coughing up blood.  Shortness of breath.  Weight loss that cannot be explained.  Constant tiredness (fatigue).  Hoarse voice.  Summary  Lung cancer screening may find lung cancer before symptoms appear. Finding cancer early improves the chances of successful treatment. It may save your life.  The recommended screening test is a low-dose computed tomography (LDCT) scan that looks for abnormal cells in the lungs. If you are at risk for lung cancer, it is recommended that you get screened once a year.  You can make lifestyle changes to lower your risk of lung cancer.  Ask your health care provider about the risks and benefits of screening.  This information is not intended to replace advice given to you by your health care provider. Make sure you discuss any questions you have with your health care provider.  Document Revised: 06/08/2022 Document Reviewed: 06/08/2022  [employer] Patient Education © 2022 [employer]

## 2022-09-28 VITALS
SYSTOLIC BLOOD PRESSURE: 126 MMHG | BODY MASS INDEX: 20.01 KG/M2 | HEART RATE: 72 BPM | RESPIRATION RATE: 14 BRPM | TEMPERATURE: 98.2 F | DIASTOLIC BLOOD PRESSURE: 70 MMHG | OXYGEN SATURATION: 97 % | WEIGHT: 106 LBS | HEIGHT: 61 IN

## 2022-09-28 RX ORDER — ESTRADIOL 0.1 MG/G
2 CREAM VAGINAL 2 TIMES WEEKLY
Qty: 20 G | Refills: 5 | Status: SHIPPED | OUTPATIENT
Start: 2022-09-29 | End: 2023-02-01 | Stop reason: SDUPTHER

## 2022-09-28 RX ORDER — SPIRONOLACTONE 25 MG/1
25 TABLET ORAL EVERY MORNING
Qty: 30 TABLET | Refills: 5 | Status: SHIPPED | OUTPATIENT
Start: 2022-09-28 | End: 2023-02-01 | Stop reason: SDUPTHER

## 2022-09-28 RX ORDER — MONTELUKAST SODIUM 10 MG/1
10 TABLET ORAL NIGHTLY
Qty: 30 TABLET | Refills: 5 | Status: SHIPPED | OUTPATIENT
Start: 2022-09-28 | End: 2023-02-01 | Stop reason: SDUPTHER

## 2022-09-28 RX ORDER — ATORVASTATIN CALCIUM 40 MG/1
40 TABLET, FILM COATED ORAL
Qty: 30 TABLET | Refills: 5 | Status: SHIPPED | OUTPATIENT
Start: 2022-09-28 | End: 2023-02-01 | Stop reason: SDUPTHER

## 2022-09-28 RX ORDER — DILTIAZEM HYDROCHLORIDE 360 MG/1
360 CAPSULE, EXTENDED RELEASE ORAL DAILY
Qty: 30 CAPSULE | Refills: 5 | Status: SHIPPED | OUTPATIENT
Start: 2022-09-28 | End: 2023-02-01 | Stop reason: SDUPTHER

## 2022-09-28 RX ORDER — SUMATRIPTAN 100 MG/1
TABLET, FILM COATED ORAL
Qty: 9 TABLET | Refills: 5 | Status: SHIPPED | OUTPATIENT
Start: 2022-09-28 | End: 2023-02-01 | Stop reason: SDUPTHER

## 2022-09-28 RX ORDER — IPRATROPIUM BROMIDE AND ALBUTEROL SULFATE 2.5; .5 MG/3ML; MG/3ML
3 SOLUTION RESPIRATORY (INHALATION) 4 TIMES DAILY PRN
Qty: 540 ML | Refills: 5 | Status: SHIPPED | OUTPATIENT
Start: 2022-09-28 | End: 2023-02-01 | Stop reason: SDUPTHER

## 2022-09-28 RX ORDER — ASPIRIN 81 MG/1
81 TABLET ORAL DAILY
Qty: 30 TABLET | Refills: 5 | Status: SHIPPED | OUTPATIENT
Start: 2022-09-28

## 2022-09-28 RX ORDER — LANOLIN ALCOHOL/MO/W.PET/CERES
1000 CREAM (GRAM) TOPICAL DAILY
Qty: 30 TABLET | Refills: 5 | Status: SHIPPED | OUTPATIENT
Start: 2022-09-28 | End: 2023-02-01 | Stop reason: SDUPTHER

## 2022-09-28 RX ORDER — MECLIZINE HYDROCHLORIDE 25 MG/1
25 TABLET ORAL 3 TIMES DAILY PRN
Qty: 90 TABLET | Refills: 5 | Status: SHIPPED | OUTPATIENT
Start: 2022-09-28

## 2022-09-28 RX ORDER — PANTOPRAZOLE SODIUM 40 MG/1
40 TABLET, DELAYED RELEASE ORAL 2 TIMES DAILY
Qty: 60 TABLET | Refills: 5 | Status: SHIPPED | OUTPATIENT
Start: 2022-09-28 | End: 2023-02-01 | Stop reason: SDUPTHER

## 2022-11-15 DIAGNOSIS — J44.9 COPD MIXED TYPE: ICD-10-CM

## 2023-01-31 ENCOUNTER — OFFICE VISIT (OUTPATIENT)
Dept: FAMILY MEDICINE CLINIC | Facility: CLINIC | Age: 73
End: 2023-01-31
Payer: MEDICARE

## 2023-01-31 DIAGNOSIS — Z00.00 HEALTHCARE MAINTENANCE: ICD-10-CM

## 2023-01-31 DIAGNOSIS — Z87.440 H/O RECURRENT URINARY TRACT INFECTION: ICD-10-CM

## 2023-01-31 DIAGNOSIS — Z87.891 PERSONAL HISTORY OF NICOTINE DEPENDENCE: ICD-10-CM

## 2023-01-31 DIAGNOSIS — G43.009 MIGRAINE WITHOUT AURA AND WITHOUT STATUS MIGRAINOSUS, NOT INTRACTABLE: ICD-10-CM

## 2023-01-31 DIAGNOSIS — R73.03 PREDIABETES: ICD-10-CM

## 2023-01-31 DIAGNOSIS — R79.89 ELEVATED TSH: ICD-10-CM

## 2023-01-31 DIAGNOSIS — I10 ESSENTIAL HYPERTENSION: ICD-10-CM

## 2023-01-31 DIAGNOSIS — E78.2 MIXED HYPERLIPIDEMIA: Primary | ICD-10-CM

## 2023-01-31 DIAGNOSIS — K52.9 CHRONIC DIARRHEA: ICD-10-CM

## 2023-01-31 DIAGNOSIS — K21.9 GASTROESOPHAGEAL REFLUX DISEASE WITHOUT ESOPHAGITIS: ICD-10-CM

## 2023-01-31 DIAGNOSIS — N32.89 BLADDER INSTABILITY: ICD-10-CM

## 2023-01-31 DIAGNOSIS — J44.9 COPD MIXED TYPE: ICD-10-CM

## 2023-01-31 DIAGNOSIS — J44.9 CHRONIC OBSTRUCTIVE PULMONARY DISEASE, UNSPECIFIED COPD TYPE: ICD-10-CM

## 2023-01-31 DIAGNOSIS — K58.8 OTHER IRRITABLE BOWEL SYNDROME: ICD-10-CM

## 2023-01-31 DIAGNOSIS — I25.10 CORONARY ARTERY CALCIFICATION SEEN ON CT SCAN: ICD-10-CM

## 2023-01-31 DIAGNOSIS — E04.9 GOITER: ICD-10-CM

## 2023-01-31 DIAGNOSIS — N81.89 PELVIC RELAXATION: ICD-10-CM

## 2023-01-31 DIAGNOSIS — M15.9 PRIMARY OSTEOARTHRITIS INVOLVING MULTIPLE JOINTS: ICD-10-CM

## 2023-01-31 DIAGNOSIS — M81.0 AGE-RELATED OSTEOPOROSIS WITHOUT CURRENT PATHOLOGICAL FRACTURE: ICD-10-CM

## 2023-01-31 DIAGNOSIS — F17.200 SMOKER: ICD-10-CM

## 2023-01-31 DIAGNOSIS — N39.0 RECURRENT UTI: ICD-10-CM

## 2023-01-31 PROBLEM — B37.0 ORAL CANDIDIASIS: Status: RESOLVED | Noted: 2018-10-15 | Resolved: 2023-01-31

## 2023-01-31 PROCEDURE — 99214 OFFICE O/P EST MOD 30 MIN: CPT | Performed by: GENERAL PRACTICE

## 2023-02-01 VITALS
DIASTOLIC BLOOD PRESSURE: 70 MMHG | SYSTOLIC BLOOD PRESSURE: 131 MMHG | TEMPERATURE: 98.6 F | HEART RATE: 96 BPM | RESPIRATION RATE: 15 BRPM | OXYGEN SATURATION: 97 % | WEIGHT: 101 LBS | BODY MASS INDEX: 19.07 KG/M2 | HEIGHT: 61 IN

## 2023-02-01 LAB
25(OH)D3+25(OH)D2 SERPL-MCNC: 34.3 NG/ML (ref 30–100)
ALBUMIN SERPL-MCNC: 4.4 G/DL (ref 3.5–5.2)
ALBUMIN/GLOB SERPL: 1.6 G/DL
ALP SERPL-CCNC: 65 U/L (ref 39–117)
ALT SERPL-CCNC: 10 U/L (ref 1–33)
APPEARANCE UR: CLEAR
AST SERPL-CCNC: 14 U/L (ref 1–32)
BACTERIA #/AREA URNS HPF: NORMAL /HPF
BASOPHILS # BLD AUTO: 0.09 10*3/MM3 (ref 0–0.2)
BASOPHILS NFR BLD AUTO: 0.8 % (ref 0–1.5)
BILIRUB SERPL-MCNC: 0.5 MG/DL (ref 0–1.2)
BILIRUB UR QL STRIP: NEGATIVE
BUN SERPL-MCNC: 8 MG/DL (ref 8–23)
BUN/CREAT SERPL: 11.9 (ref 7–25)
CALCIUM SERPL-MCNC: 8.9 MG/DL (ref 8.6–10.5)
CASTS URNS QL MICRO: NORMAL /LPF
CHLORIDE SERPL-SCNC: 102 MMOL/L (ref 98–107)
CHOLEST SERPL-MCNC: 188 MG/DL (ref 0–200)
CO2 SERPL-SCNC: 24 MMOL/L (ref 22–29)
COLOR UR: YELLOW
CREAT SERPL-MCNC: 0.67 MG/DL (ref 0.57–1)
EGFRCR SERPLBLD CKD-EPI 2021: 93 ML/MIN/1.73
EOSINOPHIL # BLD AUTO: 0.19 10*3/MM3 (ref 0–0.4)
EOSINOPHIL NFR BLD AUTO: 1.6 % (ref 0.3–6.2)
EPI CELLS #/AREA URNS HPF: NORMAL /HPF (ref 0–10)
ERYTHROCYTE [DISTWIDTH] IN BLOOD BY AUTOMATED COUNT: 12.2 % (ref 12.3–15.4)
GLOBULIN SER CALC-MCNC: 2.7 GM/DL
GLUCOSE SERPL-MCNC: 86 MG/DL (ref 65–99)
GLUCOSE UR QL STRIP: NEGATIVE
HCT VFR BLD AUTO: 40.5 % (ref 34–46.6)
HDLC SERPL-MCNC: 60 MG/DL (ref 40–60)
HGB BLD-MCNC: 13.3 G/DL (ref 12–15.9)
HGB UR QL STRIP: NEGATIVE
IMM GRANULOCYTES # BLD AUTO: 0.06 10*3/MM3 (ref 0–0.05)
IMM GRANULOCYTES NFR BLD AUTO: 0.5 % (ref 0–0.5)
KETONES UR QL STRIP: NEGATIVE
LDLC SERPL CALC-MCNC: 111 MG/DL (ref 0–100)
LEUKOCYTE ESTERASE UR QL STRIP: NEGATIVE
LYMPHOCYTES # BLD AUTO: 4.75 10*3/MM3 (ref 0.7–3.1)
LYMPHOCYTES NFR BLD AUTO: 41.2 % (ref 19.6–45.3)
MCH RBC QN AUTO: 32 PG (ref 26.6–33)
MCHC RBC AUTO-ENTMCNC: 32.8 G/DL (ref 31.5–35.7)
MCV RBC AUTO: 97.4 FL (ref 79–97)
MICRO URNS: NORMAL
MICRO URNS: NORMAL
MONOCYTES # BLD AUTO: 0.57 10*3/MM3 (ref 0.1–0.9)
MONOCYTES NFR BLD AUTO: 4.9 % (ref 5–12)
NEUTROPHILS # BLD AUTO: 5.87 10*3/MM3 (ref 1.7–7)
NEUTROPHILS NFR BLD AUTO: 51 % (ref 42.7–76)
NITRITE UR QL STRIP: NEGATIVE
NRBC BLD AUTO-RTO: 0.1 /100 WBC (ref 0–0.2)
PH UR STRIP: 6.5 [PH] (ref 5–7.5)
PLATELET # BLD AUTO: 309 10*3/MM3 (ref 140–450)
POTASSIUM SERPL-SCNC: 3.9 MMOL/L (ref 3.5–5.2)
PROT SERPL-MCNC: 7.1 G/DL (ref 6–8.5)
PROT UR QL STRIP: NEGATIVE
RBC # BLD AUTO: 4.16 10*6/MM3 (ref 3.77–5.28)
RBC #/AREA URNS HPF: NORMAL /HPF (ref 0–2)
SODIUM SERPL-SCNC: 135 MMOL/L (ref 136–145)
SP GR UR STRIP: 1.01 (ref 1–1.03)
TRIGL SERPL-MCNC: 94 MG/DL (ref 0–150)
TSH SERPL DL<=0.005 MIU/L-ACNC: 6.74 UIU/ML (ref 0.27–4.2)
URINALYSIS REFLEX: NORMAL
UROBILINOGEN UR STRIP-MCNC: 0.2 MG/DL (ref 0.2–1)
VLDLC SERPL CALC-MCNC: 17 MG/DL (ref 5–40)
WBC # BLD AUTO: 11.53 10*3/MM3 (ref 3.4–10.8)
WBC #/AREA URNS HPF: NORMAL /HPF (ref 0–5)

## 2023-02-01 RX ORDER — MONTELUKAST SODIUM 10 MG/1
10 TABLET ORAL NIGHTLY
Qty: 30 TABLET | Refills: 5 | Status: SHIPPED | OUTPATIENT
Start: 2023-02-01

## 2023-02-01 RX ORDER — DILTIAZEM HYDROCHLORIDE 360 MG/1
360 CAPSULE, EXTENDED RELEASE ORAL DAILY
Qty: 30 CAPSULE | Refills: 5 | Status: SHIPPED | OUTPATIENT
Start: 2023-02-01

## 2023-02-01 RX ORDER — ESTRADIOL 0.1 MG/G
2 CREAM VAGINAL 2 TIMES WEEKLY
Qty: 20 G | Refills: 5 | Status: SHIPPED | OUTPATIENT
Start: 2023-02-02

## 2023-02-01 RX ORDER — IPRATROPIUM BROMIDE AND ALBUTEROL SULFATE 2.5; .5 MG/3ML; MG/3ML
3 SOLUTION RESPIRATORY (INHALATION) 4 TIMES DAILY PRN
Qty: 540 ML | Refills: 5 | Status: SHIPPED | OUTPATIENT
Start: 2023-02-01

## 2023-02-01 RX ORDER — PANTOPRAZOLE SODIUM 40 MG/1
40 TABLET, DELAYED RELEASE ORAL 2 TIMES DAILY
Qty: 60 TABLET | Refills: 5 | Status: SHIPPED | OUTPATIENT
Start: 2023-02-01

## 2023-02-01 RX ORDER — ATORVASTATIN CALCIUM 40 MG/1
40 TABLET, FILM COATED ORAL
Qty: 30 TABLET | Refills: 5 | Status: SHIPPED | OUTPATIENT
Start: 2023-02-01

## 2023-02-01 RX ORDER — SUMATRIPTAN 100 MG/1
TABLET, FILM COATED ORAL
Qty: 9 TABLET | Refills: 5 | Status: SHIPPED | OUTPATIENT
Start: 2023-02-01

## 2023-02-01 RX ORDER — LANOLIN ALCOHOL/MO/W.PET/CERES
1000 CREAM (GRAM) TOPICAL DAILY
Qty: 30 TABLET | Refills: 5 | Status: SHIPPED | OUTPATIENT
Start: 2023-02-01

## 2023-02-01 RX ORDER — SPIRONOLACTONE 25 MG/1
25 TABLET ORAL EVERY MORNING
Qty: 30 TABLET | Refills: 5 | Status: SHIPPED | OUTPATIENT
Start: 2023-02-01

## 2023-03-01 ENCOUNTER — HOSPITAL ENCOUNTER (OUTPATIENT)
Dept: CT IMAGING | Facility: HOSPITAL | Age: 73
Discharge: HOME | End: 2023-03-01
Admitting: GENERAL PRACTICE
Payer: MEDICARE

## 2023-03-01 DIAGNOSIS — J44.9 COPD MIXED TYPE: ICD-10-CM

## 2023-03-01 DIAGNOSIS — Z87.891 PERSONAL HISTORY OF NICOTINE DEPENDENCE: ICD-10-CM

## 2023-03-01 DIAGNOSIS — Z00.00 HEALTHCARE MAINTENANCE: ICD-10-CM

## 2023-03-01 DIAGNOSIS — F17.200 SMOKER: ICD-10-CM

## 2023-03-01 PROCEDURE — 71271 CT THORAX LUNG CANCER SCR C-: CPT | Performed by: RADIOLOGY

## 2023-03-01 PROCEDURE — 71271 CT THORAX LUNG CANCER SCR C-: CPT

## 2023-05-22 ENCOUNTER — OFFICE VISIT (OUTPATIENT)
Dept: FAMILY MEDICINE CLINIC | Facility: CLINIC | Age: 73
End: 2023-05-22
Payer: MEDICARE

## 2023-05-22 VITALS
HEIGHT: 61 IN | RESPIRATION RATE: 15 BRPM | DIASTOLIC BLOOD PRESSURE: 64 MMHG | BODY MASS INDEX: 18.69 KG/M2 | SYSTOLIC BLOOD PRESSURE: 124 MMHG | TEMPERATURE: 98.6 F | OXYGEN SATURATION: 100 % | HEART RATE: 92 BPM | WEIGHT: 99 LBS

## 2023-05-22 DIAGNOSIS — N81.89 PELVIC RELAXATION: ICD-10-CM

## 2023-05-22 DIAGNOSIS — N32.89 BLADDER INSTABILITY: ICD-10-CM

## 2023-05-22 DIAGNOSIS — G43.009 MIGRAINE WITHOUT AURA AND WITHOUT STATUS MIGRAINOSUS, NOT INTRACTABLE: ICD-10-CM

## 2023-05-22 DIAGNOSIS — M54.9 MECHANICAL BACK PAIN: ICD-10-CM

## 2023-05-22 DIAGNOSIS — I10 ESSENTIAL HYPERTENSION: ICD-10-CM

## 2023-05-22 DIAGNOSIS — K58.8 OTHER IRRITABLE BOWEL SYNDROME: ICD-10-CM

## 2023-05-22 DIAGNOSIS — E04.9 GOITER: ICD-10-CM

## 2023-05-22 DIAGNOSIS — E78.2 MIXED HYPERLIPIDEMIA: Primary | ICD-10-CM

## 2023-05-22 DIAGNOSIS — J44.9 COPD MIXED TYPE: ICD-10-CM

## 2023-05-22 DIAGNOSIS — F17.200 SMOKER: ICD-10-CM

## 2023-05-22 DIAGNOSIS — M81.0 AGE-RELATED OSTEOPOROSIS WITHOUT CURRENT PATHOLOGICAL FRACTURE: ICD-10-CM

## 2023-05-22 DIAGNOSIS — R73.03 PREDIABETES: ICD-10-CM

## 2023-05-22 DIAGNOSIS — Z00.00 HEALTHCARE MAINTENANCE: ICD-10-CM

## 2023-05-22 DIAGNOSIS — R79.89 ELEVATED TSH: ICD-10-CM

## 2023-05-22 DIAGNOSIS — K21.9 GASTROESOPHAGEAL REFLUX DISEASE WITHOUT ESOPHAGITIS: ICD-10-CM

## 2023-05-22 DIAGNOSIS — M15.9 PRIMARY OSTEOARTHRITIS INVOLVING MULTIPLE JOINTS: ICD-10-CM

## 2023-05-22 DIAGNOSIS — I25.10 CORONARY ARTERY CALCIFICATION SEEN ON CT SCAN: ICD-10-CM

## 2023-05-22 DIAGNOSIS — Z12.31 ENCOUNTER FOR SCREENING MAMMOGRAM FOR BREAST CANCER: ICD-10-CM

## 2023-05-22 DIAGNOSIS — Z87.440 H/O RECURRENT URINARY TRACT INFECTION: ICD-10-CM

## 2023-05-22 PROBLEM — K52.9 CHRONIC DIARRHEA: Status: RESOLVED | Noted: 2023-01-31 | Resolved: 2023-05-22

## 2023-05-22 NOTE — PROGRESS NOTES
Aicha Estrella is a 72 y.o. female.     Chief Complaint  She returns for a scheduled reassessment of multiple medical problems including gastroesophageal reflux disease, IBS with diarrhea, chronic obstructive pulmonary disease, essential hypertension, hyperlipidemia, and chronic low back pain    History of Present Illness     Gastroesophageal Reflux Disease  She remains heartburn free on pantoprazole.  She continues to deny any difficulty swallowing, regurgitation, vomiting, or abdominal pain  Lab Results   Component Value Date    WBC 11.53 (H) 01/31/2023    HGB 13.3 01/31/2023    HCT 40.5 01/31/2023    MCV 97.4 (H) 01/31/2023     01/31/2023     IBS with Diarrhea  She g continues to have intermittent diarrhea but feels that the frequency and severity have returned to baseline.  She has had no problems at night.  With the exception of occasional mild nausea she denies any associated symptoms.  There is no history of any abdominal pain, hematochezia, or melena and she continues to deny any fever, chills, or night sweats.  Her last colonoscopy was on 10/26/2018 at which time several hyperplastic polyps were removed.  She remains uninterested in an updated study    COPD  She continues to experience intermittent SOB, cough and wheezing.  There is no history of any chest pain or hemoptysis and she continues to deny any fever, chills, or night sweats. She remains on trelegy and feels that it helps. She continues to use nebulized albuterol 4 times daily. She continues to smoke 1 pack per day. Updated low dose CT of the chest performed on 3/1/2023 was reported as showing COPD, a benign appearing stable 15.1 mm pleural-based RLL nodule, moderate coronary artery calcifications, and a stable goiter with substernal extension.  A one year follow-up was recommended    Essential Hypertension  She admits to dyspnea on exertion.  She continues to deny any chest pain, palpitations, orthopnea, paroxysmal nocturnal  dyspnea or peripheral edema.  She remains on diltiazem and spironolactone.   Lab Results   Component Value Date    GLUCOSE 86 01/31/2023    BUN 8 01/31/2023    CREATININE 0.67 01/31/2023    EGFRRESULT 93.0 01/31/2023    BCR 11.9 01/31/2023    K 3.9 01/31/2023    CO2 24.0 01/31/2023    CALCIUM 8.9 01/31/2023    PROTENTOTREF 7.1 01/31/2023    ALBUMIN 4.4 01/31/2023    BILITOT 0.5 01/31/2023    AST 14 01/31/2023    ALT 10 01/31/2023     Lab Results   Component Value Date    ALKPHOS 65 01/31/2023    ALKPHOS 81 01/12/2017     Hyperlipidemia  Her compliance with treatment has been fair.  She remains fairly active about the house.  She was not taking atorvastatin consistently prior to her most recent labs but has since been doing so  Lab Results   Component Value Date    CHOL 197 01/12/2017    CHLPL 188 01/31/2023    TRIG 94 01/31/2023    HDL 60 01/31/2023     (H) 01/31/2023     Chronic Low Back Pain  She feels her back pain has remained at baseline. The pain is described as a bilateral ache.  This does not radiate elsewhere and remains unassociated with any other symptoms.  She continues to deny any changes in her strength, sensation, or bowel/bladder control and there is no history of any fever, chills, or night sweats.  The pain is worse with prolonged standing and improves with rest or change of position.    Pelvic Pressure  She continues to experience pelvic pressure with prolonged standing, pushing, pulling, or lifting. This is associated with urinary frequency, urgency, dysuria, and occasional incontinence. She continues to deny any hematuria, vaginal bleeding or discharge. She has a history of recurrent urinary tract infections.  She was tried on tolterodine but stopped it due to a lack of effect and increased fatigue.  Underwent a gynecology assessment with no significant abnormalities found on history or physical exam.  She was offered a cystoscopy but decided to defer this    Labs  Most recent vitamin D  34.3  Lab Results   Component Value Date    TSH 6.740 (H) 01/31/2023     The following portions of the patient's history were reviewed and updated as appropriate: allergies, current medications, past medical history, past social history and problem list.    Review of Systems   Constitutional: Positive for fatigue. Negative for appetite change, chills, fever and unexpected weight change.   HENT: Positive for congestion and rhinorrhea. Negative for ear pain, postnasal drip, sinus pressure, sneezing, sore throat and voice change.    Eyes: Negative for visual disturbance.   Respiratory: Positive for cough, shortness of breath and wheezing. Negative for stridor.    Cardiovascular: Negative for chest pain, palpitations and leg swelling.   Gastrointestinal: Positive for diarrhea and nausea. Negative for abdominal pain, blood in stool, constipation and vomiting.   Genitourinary: Positive for dysuria (intermittent), frequency, pelvic pain (pressure) and urgency. Negative for difficulty urinating, hematuria and vaginal discharge.   Musculoskeletal: Positive for back pain. Negative for arthralgias and myalgias.   Skin: Negative for rash.   Neurological: Positive for dizziness (chronic-intermittent - responds to meclizine) and headaches (chronic - intermittent - stable). Negative for weakness and numbness.   Psychiatric/Behavioral: Negative for dysphoric mood and sleep disturbance. The patient is not nervous/anxious.      Objective   Physical Exam  Constitutional:       General: She is not in acute distress.     Appearance: Normal appearance. She is well-developed. She is not diaphoretic.      Comments: Accompanied by her . Bright and in fair spirits. No apparent distress. No pallor, jaundice, diaphoresis, or cyanosis.   HENT:      Head: Atraumatic.      Right Ear: External ear normal. There is impacted cerumen.      Left Ear: External ear normal. There is impacted cerumen.      Mouth/Throat:      Lips: No lesions.       Mouth: Mucous membranes are moist. No oral lesions.      Pharynx: No oropharyngeal exudate or posterior oropharyngeal erythema.   Eyes:      General: Lids are normal.      Extraocular Movements: Extraocular movements intact.      Conjunctiva/sclera: Conjunctivae normal.      Pupils: Pupils are equal.   Neck:      Thyroid: Thyromegaly (right lobe more prominent then the left) present. No thyroid mass.      Vascular: No carotid bruit or JVD.      Trachea: Trachea normal. No tracheal deviation.   Cardiovascular:      Rate and Rhythm: Normal rate and regular rhythm.      Heart sounds: Normal heart sounds, S1 normal and S2 normal. No murmur heard.    No gallop.   Pulmonary:      Effort: Pulmonary effort is normal.      Breath sounds: Examination of the right-lower field reveals decreased breath sounds. Examination of the left-lower field reveals decreased breath sounds. Decreased breath sounds and wheezing (diffuse - mild) present. No rales.      Comments: Pulmonary hyperinflation  Abdominal:      General: Bowel sounds are normal. There is no distension.      Hernia: No hernia is present.   Musculoskeletal:      Right lower leg: No edema.      Left lower leg: No edema.   Lymphadenopathy:      Head:      Right side of head: No submental, submandibular, tonsillar, preauricular, posterior auricular or occipital adenopathy.      Left side of head: No submental, submandibular, tonsillar, preauricular, posterior auricular or occipital adenopathy.      Cervical: No cervical adenopathy.      Upper Body:      Right upper body: No supraclavicular adenopathy.      Left upper body: No supraclavicular adenopathy.   Skin:     General: Skin is warm.      Coloration: Skin is not cyanotic, jaundiced or pale.      Findings: No rash.      Nails: There is no clubbing.   Neurological:      Mental Status: She is alert and oriented to person, place, and time.      Cranial Nerves: No cranial nerve deficit, dysarthria or facial asymmetry.       Sensory: No sensory deficit.      Motor: No tremor.      Coordination: Coordination normal.      Gait: Gait normal.   Psychiatric:         Attention and Perception: Attention normal.         Mood and Affect: Mood normal.         Speech: Speech normal.         Behavior: Behavior normal.         Thought Content: Thought content normal.       Assessment & Plan   Problems Addressed this Visit        Cardiac and Vasculature    Coronary artery calcification seen on CT scan  Reminded regarding risk factor modification with an emphasis on tobacco cessation.  Continue low-dose ASA    Essential hypertension   Hypertension: at goal. Evidence of target organ damage: coronary artery calcifications on previous imaging.  Encouraged to continue to work on diet and exercise plan.   Continue current medication    Mixed hyperlipidemia  As above.  Continue current medication       Endocrine and Metabolic    Goiter  Clinically euthyroid.  Will continue to monitor    Prediabetes  Will continue to monitor       Gastrointestinal Abdominal     Gastroesophageal reflux disease without esophagitis    IBS (irritable bowel syndrome)  Reminded regarding lifestyle modification  Encouraged to report if any worse or if any new symptoms or concerns.       Genitourinary and Reproductive     Bladder instability    Encounter for screening mammogram for breast cancer    Relevant Orders    Mammo Screening Digital Tomosynthesis Bilateral With CAD    H/O recurrent urinary tract infection    Pelvic relaxation       Health Encounters    Healthcare maintenance  Reminded that she is due for Shingrix  We will arrange an updated mammogram    Relevant Orders    Mammo Screening Digital Tomosynthesis Bilateral With CAD       Musculoskeletal and Injuries    Age-related osteoporosis without current pathological fracture  Encouraged to continue to pursue weight bearing activities while exercising joint protection.  Reminded that she is due for an updated dose of  denosumab    Mechanical back pain    Primary osteoarthritis    Reminded regarding symptomatic treatment.   Continue current medication           Neuro    Migraine without aura and without status migrainosus, not intractable       Pulmonary and Pneumonias    COPD mixed type   COPD is stable.  Reminded of the importance of smoking cessation  Encouraged to remain as active as symptoms allow for  Continue current medication       Symptoms and Signs    Elevated TSH       Tobacco    Smoker   Diagnoses       Codes Comments    Mixed hyperlipidemia    -  Primary ICD-10-CM: E78.2  ICD-9-CM: 272.2     Essential hypertension     ICD-10-CM: I10  ICD-9-CM: 401.9     Coronary artery calcification seen on CT scan     ICD-10-CM: I25.10  ICD-9-CM: 414.00     Prediabetes     ICD-10-CM: R73.03  ICD-9-CM: 790.29     Goiter     ICD-10-CM: E04.9  ICD-9-CM: 240.9     Gastroesophageal reflux disease without esophagitis     ICD-10-CM: K21.9  ICD-9-CM: 530.81     Other irritable bowel syndrome     ICD-10-CM: K58.8  ICD-9-CM: 564.1     Bladder instability     ICD-10-CM: N32.89  ICD-9-CM: 596.89     H/O recurrent urinary tract infection     ICD-10-CM: Z87.440  ICD-9-CM: V13.02     Pelvic relaxation     ICD-10-CM: N81.89  ICD-9-CM: 618.89     Healthcare maintenance     ICD-10-CM: Z00.00  ICD-9-CM: V70.0     Primary osteoarthritis involving multiple joints     ICD-10-CM: M15.9  ICD-9-CM: 715.98     Age-related osteoporosis without current pathological fracture     ICD-10-CM: M81.0  ICD-9-CM: 733.01     Mechanical back pain     ICD-10-CM: M54.9  ICD-9-CM: 724.5     Migraine without aura and without status migrainosus, not intractable     ICD-10-CM: G43.009  ICD-9-CM: 346.10     COPD mixed type     ICD-10-CM: J44.9  ICD-9-CM: 496     Elevated TSH     ICD-10-CM: R79.89  ICD-9-CM: 794.5     Smoker     ICD-10-CM: F17.200  ICD-9-CM: 305.1     Encounter for screening mammogram for breast cancer     ICD-10-CM: Z12.31  ICD-9-CM: V76.12

## 2023-06-02 NOTE — TELEPHONE ENCOUNTER
Problem: Adult Inpatient Plan of Care  Goal: Plan of Care Review  Outcome: Ongoing, Progressing     Problem: Adult Inpatient Plan of Care  Goal: Absence of Hospital-Acquired Illness or Injury  Outcome: Ongoing, Progressing     Problem: Bariatric Environmental Safety  Goal: Safety Maintained with Care  Outcome: Ongoing, Progressing     Problem: Self-Care Deficit  Goal: Improved Ability to Complete Activities of Daily Living  Outcome: Ongoing, Progressing     Problem: Behavior Regulation Impairment (Disruptive Behavior)  Goal: Improved Impulse and Aggression Control (Disruptive Behavior)  Outcome: Ongoing, Progressing     Problem: Sleep Disturbance (Disruptive Behavior)  Goal: Improved Sleep (Disruptive Behavior)  Outcome: Ongoing, Progressing      Caller: Albino Estrella    Relationship: Self    Best call back number: [phone]    Medication needed:   Requested Prescriptions     Pending Prescriptions Disp Refills   • dilTIAZem (TIAZAC) 360 MG 24 hr capsule 30 capsule 5     Sig: Take 1 capsule by mouth Daily.       When do you need the refill by: 01/19/2021    What details did the patient provide when requesting the medication: Patient is out of medication.     Does the patient have less than a 3 day supply:  [x] Yes  [] No    What is the patient's preferred pharmacy: [id number] [address] AT [facility] [address] - [phone] PH - [phone] FX

## 2023-06-06 ENCOUNTER — CLINICAL SUPPORT (OUTPATIENT)
Dept: FAMILY MEDICINE CLINIC | Facility: CLINIC | Age: 73
End: 2023-06-06
Payer: MEDICARE

## 2023-06-06 DIAGNOSIS — M15.9 PRIMARY OSTEOARTHRITIS INVOLVING MULTIPLE JOINTS: Primary | ICD-10-CM

## 2023-06-06 DIAGNOSIS — M81.0 AGE-RELATED OSTEOPOROSIS WITHOUT CURRENT PATHOLOGICAL FRACTURE: ICD-10-CM

## 2023-08-21 ENCOUNTER — OFFICE VISIT (OUTPATIENT)
Dept: FAMILY MEDICINE CLINIC | Facility: CLINIC | Age: 73
End: 2023-08-21
Payer: MEDICARE

## 2023-08-21 VITALS
HEART RATE: 74 BPM | OXYGEN SATURATION: 93 % | BODY MASS INDEX: 18.88 KG/M2 | DIASTOLIC BLOOD PRESSURE: 65 MMHG | SYSTOLIC BLOOD PRESSURE: 122 MMHG | WEIGHT: 100 LBS | RESPIRATION RATE: 15 BRPM | HEIGHT: 61 IN | TEMPERATURE: 98.6 F

## 2023-08-21 DIAGNOSIS — I25.10 CORONARY ARTERY CALCIFICATION SEEN ON CT SCAN: Primary | ICD-10-CM

## 2023-08-21 DIAGNOSIS — F17.200 SMOKER: ICD-10-CM

## 2023-08-21 DIAGNOSIS — M54.9 MECHANICAL BACK PAIN: ICD-10-CM

## 2023-08-21 DIAGNOSIS — E04.9 GOITER: ICD-10-CM

## 2023-08-21 DIAGNOSIS — K58.8 OTHER IRRITABLE BOWEL SYNDROME: ICD-10-CM

## 2023-08-21 DIAGNOSIS — M15.9 PRIMARY OSTEOARTHRITIS INVOLVING MULTIPLE JOINTS: ICD-10-CM

## 2023-08-21 DIAGNOSIS — K21.9 GASTROESOPHAGEAL REFLUX DISEASE WITHOUT ESOPHAGITIS: ICD-10-CM

## 2023-08-21 DIAGNOSIS — N81.89 PELVIC RELAXATION: ICD-10-CM

## 2023-08-21 DIAGNOSIS — Z87.440 H/O RECURRENT URINARY TRACT INFECTION: ICD-10-CM

## 2023-08-21 DIAGNOSIS — M81.0 AGE-RELATED OSTEOPOROSIS WITHOUT CURRENT PATHOLOGICAL FRACTURE: ICD-10-CM

## 2023-08-21 DIAGNOSIS — G43.009 MIGRAINE WITHOUT AURA AND WITHOUT STATUS MIGRAINOSUS, NOT INTRACTABLE: ICD-10-CM

## 2023-08-21 DIAGNOSIS — J44.9 COPD MIXED TYPE: ICD-10-CM

## 2023-08-21 DIAGNOSIS — E78.2 MIXED HYPERLIPIDEMIA: ICD-10-CM

## 2023-08-21 DIAGNOSIS — Z00.00 HEALTHCARE MAINTENANCE: ICD-10-CM

## 2023-08-21 DIAGNOSIS — R73.03 PREDIABETES: ICD-10-CM

## 2023-08-21 DIAGNOSIS — N32.89 BLADDER INSTABILITY: ICD-10-CM

## 2023-08-21 DIAGNOSIS — I10 ESSENTIAL HYPERTENSION: ICD-10-CM

## 2023-08-21 DIAGNOSIS — R79.89 ELEVATED TSH: ICD-10-CM

## 2023-08-21 PROCEDURE — 99214 OFFICE O/P EST MOD 30 MIN: CPT | Performed by: GENERAL PRACTICE

## 2023-08-21 PROCEDURE — 3074F SYST BP LT 130 MM HG: CPT | Performed by: GENERAL PRACTICE

## 2023-08-21 PROCEDURE — 3078F DIAST BP <80 MM HG: CPT | Performed by: GENERAL PRACTICE

## 2023-08-21 RX ORDER — FLUCONAZOLE 150 MG/1
150 TABLET ORAL DAILY PRN
Qty: 3 TABLET | Refills: 0 | Status: SHIPPED | OUTPATIENT
Start: 2023-08-21

## 2023-08-21 NOTE — PROGRESS NOTES
Subjective   Albino Estrella is a 72 y.o. female.     Chief Complaint  She returns for a scheduled reassessment of multiple medical problems including gastroesophageal reflux disease, irritable bowel syndrome with diarrhea, chronic obstructive pulmonary disease, essential hypertension, hyperlipidemia, chronic low back pain, and chronic pelvic pressure    History of Present Illness     Gastroesophageal Reflux Disease  She remains heartburn free on pantoprazole.  She continues to deny any difficulty swallowing, regurgitation, vomiting, or abdominal pain    IBS with Diarrhea  She continues to have intermittent diarrhea, but feels that the frequency and severity have returned to baseline.  She has had no problems at night.  With the exception of occasional mild nausea, she denies any associated symptoms.  There is no history of any abdominal pain, hematochezia, or melena, and she continues to deny any fever, chills, or night sweats.  Her last colonoscopy was on 10/26/2018 at which time several hyperplastic polyps were removed.  She is uninterested in pursuing an updated study at present    COPD  She continues to experience intermittent SOB, cough and wheezing.  There is no history of any chest pain or hemoptysis and she continues to deny any fever, chills, or night sweats. She remains on trelegy and feels that it helps. She continues to use nebulized albuterol 4 times daily. She continues to smoke 1 pack per day. Updated low dose CT of the chest performed on 3/1/2023 was reported as showing COPD, a benign appearing stable 15.1 mm pleural-based RLL nodule, moderate coronary artery calcifications, and a stable goiter with substernal extension.  A one year follow-up was recommended    Essential Hypertension  She admits to dyspnea on exertion.  She continues to deny any chest pain, palpitations, orthopnea, paroxysmal nocturnal dyspnea or peripheral edema.  She remains on diltiazem and spironolactone.     Hyperlipidemia  Her  compliance with treatment has been fair.  She remains fairly active about the house.  She appears to be getting atorvastatin as prescribed    Chronic Low Back Pain  She feels her back pain has remained at baseline. The pain is described as a bilateral ache.  This does not radiate elsewhere and remains unassociated with any other symptoms.  She continues to deny any changes in her strength, sensation, or bowel/bladder control and there is no history of any fever, chills, or night sweats.  The pain is worse with prolonged standing and improves with rest or change of position.    Pelvic Pressure  She continues to experience pelvic pressure with prolonged standing, pushing, pulling, or lifting. This is associated with urinary frequency, urgency, dysuria, and occasional incontinence. She continues to deny any hematuria, vaginal bleeding or discharge. She has a history of recurrent urinary tract infections and has had mild intermittent dysuria over the last week or two.  She was tried on tolterodine but stopped it due to a lack of effect and increased fatigue.She underwent a gynecology assessment with no significant abnormalities found on history or physical exam.  She was offered a cystoscopy but decided to defer this    The following portions of the patient's history were reviewed and updated as appropriate: allergies, current medications, past medical history, past social history, and problem list.    Review of Systems   Constitutional:  Positive for fatigue. Negative for appetite change, chills, fever and unexpected weight change.   HENT:  Positive for congestion and rhinorrhea. Negative for ear pain, postnasal drip, sinus pressure, sneezing, sore throat and voice change.    Eyes:  Negative for visual disturbance.   Respiratory:  Positive for cough, shortness of breath and wheezing. Negative for stridor.    Cardiovascular:  Negative for chest pain, palpitations and leg swelling.   Gastrointestinal:  Positive for  diarrhea and nausea. Negative for abdominal pain, blood in stool, constipation and vomiting.   Genitourinary:  Positive for dysuria (intermittent), frequency, pelvic pain (pressure) and urgency. Negative for difficulty urinating, hematuria and vaginal discharge.   Musculoskeletal:  Positive for back pain. Negative for arthralgias and myalgias.   Skin:  Negative for rash.   Neurological:  Positive for dizziness (chronic-intermittent - responds to meclizine) and headaches (chronic - intermittent - stable). Negative for weakness and numbness.   Psychiatric/Behavioral:  Negative for dysphoric mood and sleep disturbance. The patient is not nervous/anxious.      Objective   Physical Exam  Constitutional:       General: She is not in acute distress.     Appearance: Normal appearance. She is well-developed. She is not diaphoretic.      Comments: Accompanied by her . Bright and in fair spirits. No apparent distress. No pallor, jaundice, diaphoresis, or cyanosis.   HENT:      Head: Atraumatic.      Right Ear: External ear normal. There is impacted cerumen.      Left Ear: External ear normal. There is impacted cerumen.      Mouth/Throat:      Lips: No lesions.      Mouth: Mucous membranes are moist. No oral lesions.      Pharynx: No oropharyngeal exudate or posterior oropharyngeal erythema.   Eyes:      General: Lids are normal.      Extraocular Movements: Extraocular movements intact.      Conjunctiva/sclera: Conjunctivae normal.      Pupils: Pupils are equal.   Neck:      Thyroid: Thyromegaly (right lobe more prominent then the left) present. No thyroid mass.      Vascular: No carotid bruit or JVD.      Trachea: Trachea normal. No tracheal deviation.   Cardiovascular:      Rate and Rhythm: Normal rate and regular rhythm.      Heart sounds: Normal heart sounds, S1 normal and S2 normal. No murmur heard.    No gallop.   Pulmonary:      Effort: Pulmonary effort is normal.      Breath sounds: Examination of the right-lower  field reveals decreased breath sounds. Examination of the left-lower field reveals decreased breath sounds. Decreased breath sounds and wheezing (diffuse - mild) present. No rales.      Comments: Pulmonary hyperinflation  Abdominal:      General: Bowel sounds are normal. There is no distension.   Musculoskeletal:      Right lower leg: No edema.      Left lower leg: No edema.   Lymphadenopathy:      Head:      Right side of head: No submental, submandibular, tonsillar, preauricular, posterior auricular or occipital adenopathy.      Left side of head: No submental, submandibular, tonsillar, preauricular, posterior auricular or occipital adenopathy.      Cervical: No cervical adenopathy.      Upper Body:      Right upper body: No supraclavicular adenopathy.      Left upper body: No supraclavicular adenopathy.   Skin:     General: Skin is warm.      Coloration: Skin is not cyanotic, jaundiced or pale.      Findings: No rash.      Nails: There is no clubbing.   Neurological:      Mental Status: She is alert and oriented to person, place, and time.      Cranial Nerves: No cranial nerve deficit, dysarthria or facial asymmetry.      Sensory: No sensory deficit.      Motor: No tremor.      Coordination: Coordination normal.      Gait: Gait normal.   Psychiatric:         Attention and Perception: Attention normal.         Mood and Affect: Mood normal.         Speech: Speech normal.         Behavior: Behavior normal.         Thought Content: Thought content normal.     Assessment & Plan   Problems Addressed this Visit          Cardiac and Vasculature    Coronary artery calcification seen on CT scan   Reminded regarding risk factor modification with an emphasis on tobacco cessation.  Continue low-dose ASA.    Essential hypertension   Hypertension: at goal. Evidence of target organ damage:  coronary artery calcifications on previous imaging .  Encouraged to continue to work on diet and exercise plan.   Continue current  medication    Mixed hyperlipidemia  As above.   Continue current medication.       Endocrine and Metabolic    Goiter    Prediabetes  As above..  Will continue to monitor       Gastrointestinal Abdominal     Gastroesophageal reflux disease without esophagitis   Symptoms are currently well controlled.  Continue current medication.    IBS (irritable bowel syndrome)  As above.   Continue current medication.       Genitourinary and Reproductive     Bladder instability    H/O recurrent urinary tract infection    Relevant Medications    fluconazole (DIFLUCAN) 150 MG tablet    Pelvic relaxation       Health Encounters    Healthcare maintenance  Recommended a flu shot along with an updated COVID-19 shot this fall.  Reviewed the potential benefits of RSV vaccination.  Reminded that she is due for Shingrix       Musculoskeletal and Injuries    Age-related osteoporosis without current pathological fracture    Mechanical back pain    Primary osteoarthritis       Neuro    Migraine without aura and without status migrainosus, not intractable       Pulmonary and Pneumonias    COPD mixed type   COPD is stable.  Reminded of the importance of smoking cessation  Encouraged to remain as active as symptoms allow for  Continue current medication       Symptoms and Signs    Elevated TSH       Tobacco    Smoker     Diagnoses         Codes Comments    Coronary artery calcification seen on CT scan    -  Primary ICD-10-CM: I25.10  ICD-9-CM: 414.00     Essential hypertension     ICD-10-CM: I10  ICD-9-CM: 401.9     Mixed hyperlipidemia     ICD-10-CM: E78.2  ICD-9-CM: 272.2     Goiter     ICD-10-CM: E04.9  ICD-9-CM: 240.9     Prediabetes     ICD-10-CM: R73.03  ICD-9-CM: 790.29     Gastroesophageal reflux disease without esophagitis     ICD-10-CM: K21.9  ICD-9-CM: 530.81     Other irritable bowel syndrome     ICD-10-CM: K58.8  ICD-9-CM: 564.1     Bladder instability     ICD-10-CM: N32.89  ICD-9-CM: 596.89     H/O recurrent urinary tract infection      ICD-10-CM: Z87.440  ICD-9-CM: V13.02     Pelvic relaxation     ICD-10-CM: N81.89  ICD-9-CM: 618.89     Healthcare maintenance     ICD-10-CM: Z00.00  ICD-9-CM: V70.0     Age-related osteoporosis without current pathological fracture     ICD-10-CM: M81.0  ICD-9-CM: 733.01     Mechanical back pain     ICD-10-CM: M54.9  ICD-9-CM: 724.5     Primary osteoarthritis involving multiple joints     ICD-10-CM: M15.9  ICD-9-CM: 715.98     Migraine without aura and without status migrainosus, not intractable     ICD-10-CM: G43.009  ICD-9-CM: 346.10     COPD mixed type     ICD-10-CM: J44.9  ICD-9-CM: 496     Elevated TSH     ICD-10-CM: R79.89  ICD-9-CM: 794.5     Smoker     ICD-10-CM: F17.200  ICD-9-CM: 305.1

## 2023-08-22 LAB
APPEARANCE UR: CLEAR
BACTERIA #/AREA URNS HPF: NORMAL /HPF
BILIRUB UR QL STRIP: NEGATIVE
CASTS URNS QL MICRO: NORMAL /LPF
COLOR UR: YELLOW
EPI CELLS #/AREA URNS HPF: NORMAL /HPF (ref 0–10)
GLUCOSE UR QL STRIP: NEGATIVE
HGB UR QL STRIP: NEGATIVE
KETONES UR QL STRIP: NEGATIVE
LEUKOCYTE ESTERASE UR QL STRIP: NEGATIVE
MICRO URNS: NORMAL
MICRO URNS: NORMAL
NITRITE UR QL STRIP: NEGATIVE
PH UR STRIP: 6 [PH] (ref 5–7.5)
PROT UR QL STRIP: NEGATIVE
RBC #/AREA URNS HPF: NORMAL /HPF (ref 0–2)
SP GR UR STRIP: 1.01 (ref 1–1.03)
URINALYSIS REFLEX: NORMAL
UROBILINOGEN UR STRIP-MCNC: 0.2 MG/DL (ref 0.2–1)
WBC #/AREA URNS HPF: NORMAL /HPF (ref 0–5)

## 2023-08-23 DIAGNOSIS — J44.9 COPD MIXED TYPE: ICD-10-CM

## 2023-08-24 RX ORDER — FLUTICASONE FUROATE, UMECLIDINIUM BROMIDE AND VILANTEROL TRIFENATATE 100; 62.5; 25 UG/1; UG/1; UG/1
POWDER RESPIRATORY (INHALATION)
Qty: 60 EACH | Refills: 5 | Status: SHIPPED | OUTPATIENT
Start: 2023-08-24

## 2023-08-28 ENCOUNTER — HOSPITAL ENCOUNTER (OUTPATIENT)
Dept: MAMMOGRAPHY | Facility: HOSPITAL | Age: 73
Discharge: HOME | End: 2023-08-28
Admitting: GENERAL PRACTICE
Payer: MEDICARE

## 2023-08-28 DIAGNOSIS — Z12.31 ENCOUNTER FOR SCREENING MAMMOGRAM FOR BREAST CANCER: ICD-10-CM

## 2023-08-28 DIAGNOSIS — Z00.00 HEALTHCARE MAINTENANCE: ICD-10-CM

## 2023-08-28 PROCEDURE — 77063 BREAST TOMOSYNTHESIS BI: CPT

## 2023-08-28 PROCEDURE — 77067 SCR MAMMO BI INCL CAD: CPT

## 2023-09-21 ENCOUNTER — OFFICE VISIT (OUTPATIENT)
Dept: FAMILY MEDICINE CLINIC | Facility: CLINIC | Age: 73
End: 2023-09-21
Payer: MEDICARE

## 2023-09-21 VITALS
BODY MASS INDEX: 18.88 KG/M2 | HEIGHT: 61 IN | WEIGHT: 100 LBS | OXYGEN SATURATION: 96 % | SYSTOLIC BLOOD PRESSURE: 130 MMHG | DIASTOLIC BLOOD PRESSURE: 85 MMHG | TEMPERATURE: 97.5 F | HEART RATE: 91 BPM

## 2023-09-21 DIAGNOSIS — R10.2 SUPRAPUBIC PAIN: ICD-10-CM

## 2023-09-21 DIAGNOSIS — N89.8 VAGINAL DISCHARGE: Primary | ICD-10-CM

## 2023-09-21 DIAGNOSIS — R10.2 RIGHT ADNEXAL TENDERNESS: ICD-10-CM

## 2023-09-21 PROCEDURE — 87798 DETECT AGENT NOS DNA AMP: CPT | Performed by: PHYSICIAN ASSISTANT

## 2023-09-21 PROCEDURE — 3079F DIAST BP 80-89 MM HG: CPT | Performed by: PHYSICIAN ASSISTANT

## 2023-09-21 PROCEDURE — 3075F SYST BP GE 130 - 139MM HG: CPT | Performed by: PHYSICIAN ASSISTANT

## 2023-09-21 PROCEDURE — 87661 TRICHOMONAS VAGINALIS AMPLIF: CPT | Performed by: PHYSICIAN ASSISTANT

## 2023-09-21 PROCEDURE — 99213 OFFICE O/P EST LOW 20 MIN: CPT | Performed by: PHYSICIAN ASSISTANT

## 2023-09-21 PROCEDURE — 87801 DETECT AGNT MULT DNA AMPLI: CPT | Performed by: PHYSICIAN ASSISTANT

## 2023-09-21 RX ORDER — FLUCONAZOLE 150 MG/1
150 TABLET ORAL DAILY
Qty: 7 TABLET | Refills: 0 | Status: SHIPPED | OUTPATIENT
Start: 2023-09-21

## 2023-09-21 NOTE — PROGRESS NOTES
Subjective        Chief Complaint  Vaginal Discharge    Subjective      Albino Estrella is a 72 y.o. female who presents today to [facility] for Vaginal Discharge. She has a past medical history of Arthritis, Asthma, Bladder instability, COPD, migraine headaches, Coronary artery calcification seen on CT scan, Dyspareunia, Elevated cholesterol, GERD, Goiter, Hyperlipidemia, Hypertension, IBS, Osteoporosis, PUD, Prediabetes.    Vaginal Discharge:  She reports about 1 month ago she saw her PCP here in the office with complaints of vaginal discharge.  UA at that time was completely unremarkable.  She was treated with 3 days of fluconazole for suspected vulvovaginal candidiasis.  She reports some initial improvement, however, few days after completing therapy her symptoms returned.  She has been having a discharge which is intermittently brown and white.  She has itching in the outer irritation.  Denies any fever or chills.  She has had some right adnexal pain and tenderness is concerning for an ovarian cyst.  Denies changing any soaps or detergents.  No recent antibiotic therapy.      Current Outpatient Medications:     aspirin 81 MG EC tablet, Take 1 tablet by mouth Daily., Disp: 30 tablet, Rfl: 5    atorvastatin (LIPITOR) 40 MG tablet, Take 1 tablet by mouth every night at bedtime., Disp: 30 tablet, Rfl: 5    denosumab (PROLIA) 60 MG/ML solution prefilled syringe syringe, Inject 1 mL under the skin into the appropriate area as directed Every 6 (Six) Months., Disp: 1 each, Rfl: 5    dilTIAZem (TIAZAC) 360 MG 24 hr capsule, Take 1 capsule by mouth Daily., Disp: 30 capsule, Rfl: 5    estradiol (ESTRACE) 0.1 MG/GM vaginal cream, Insert 2 g into the vagina 2 (Two) Times a Week., Disp: 20 g, Rfl: 5    fluconazole (DIFLUCAN) 150 MG tablet, Take 1 tablet by mouth Daily., Disp: 7 tablet, Rfl: 0    Fluticasone-Umeclidin-Vilant (Trelegy Ellipta) 100-62.5-25 MCG/ACT inhaler, INHALE 1 PUFF BY  "MOUTH DAILY, Disp: 60 each, Rfl: 5    ipratropium-albuterol (DUO-NEB) 0.5-2.5 mg/3 ml nebulizer, Take 3 mL by nebulization 4 (Four) Times a Day As Needed for Wheezing or Shortness of Air., Disp: 540 mL, Rfl: 5    meclizine (Antivert) 25 MG tablet, Take 1 tablet by mouth 3 (Three) Times a Day As Needed for Dizziness., Disp: 90 tablet, Rfl: 5    montelukast (SINGULAIR) 10 MG tablet, Take 1 tablet by mouth Every Night., Disp: 30 tablet, Rfl: 5    pantoprazole (PROTONIX) 40 MG EC tablet, Take 1 tablet by mouth 2 (Two) Times a Day., Disp: 60 tablet, Rfl: 5    spironolactone (ALDACTONE) 25 MG tablet, Take 1 tablet by mouth Every Morning., Disp: 30 tablet, Rfl: 5    SUMAtriptan (IMITREX) 100 MG tablet, Take one tablet at onset of headache. May repeat dose one time in 2 hours if headache not relieved., Disp: 9 tablet, Rfl: 5    vitamin B-12 (CYANOCOBALAMIN) 1000 MCG tablet, Take 1 tablet by mouth Daily., Disp: 30 tablet, Rfl: 5    Zoster Vac Recomb Adjuvanted (Shingrix) 50 MCG/0.5ML reconstituted suspension, Inject 0.5 mL into the appropriate muscle as directed by prescriber See Admin Instructions. Repeat in 2-6 months, Disp: 1 each, Rfl: 1      No Known Allergies    Objective     Objective   Vital Signs:  Blood Pressure 130/85   Pulse 91   Temperature 97.5 °F (36.4 °C) (Temporal)   Height 154.9 cm (60.98\")   Weight 45.4 kg (100 lb)   Oxygen Saturation 96%   Body Mass Index 18.90 kg/m²   Estimated body mass index is 18.9 kg/m² as calculated from the following:    Height as of this encounter: 154.9 cm (60.98\").    Weight as of this encounter: 45.4 kg (100 lb).    BMI is within normal parameters. No other follow-up for BMI required.    Past Medical History:   Diagnosis Date    Arthritis     Asthma     Bladder instability     COPD (chronic obstructive pulmonary disease)     Coronary artery calcification seen on CT scan 02/10/2021    Dyspareunia     Elevated cholesterol     GERD (gastroesophageal reflux disease)     " Goiter 03/05/2019    Hyperlipidemia     Hypertension     IBS (irritable bowel syndrome)     Osteoporosis     post menopausal    Peptic ulcer     Prediabetes     Recurrent UTI     Screening for malignant neoplasm of cervix     Sinusitis, chronic     Weight loss      Past Surgical History:   Procedure Laterality Date    CHOLECYSTECTOMY      COLONOSCOPY N/A 10/26/2018    Procedure: COLONOSCOPY;  Surgeon: Sukhi Ruiz MD;  Location: [facility];  Service: Gastroenterology     Social History     Socioeconomic History    Marital status:    Tobacco Use    Smoking status: Every Day     Packs/day: 1.00     Years: 43.00     Pack years: 43.00     Types: Cigarettes     Passive exposure: Current    Smokeless tobacco: Never   Substance and Sexual Activity    Alcohol use: No    Drug use: No    Sexual activity: Defer      Physical Exam  Vitals and nursing note reviewed.   Constitutional:       General: She is not in acute distress.     Appearance: She is well-developed. She is not diaphoretic.   HENT:      Head: Normocephalic and atraumatic.   Eyes:      General: No scleral icterus.        Right eye: No discharge.         Left eye: No discharge.      Conjunctiva/sclera: Conjunctivae normal.   Cardiovascular:      Rate and Rhythm: Normal rate and regular rhythm.      Heart sounds: Normal heart sounds. No murmur heard.    No friction rub. No gallop.   Pulmonary:      Effort: Pulmonary effort is normal. No respiratory distress.      Breath sounds: Normal breath sounds. No wheezing or rales.   Chest:      Chest wall: No tenderness.   Abdominal:      Comments: Right lower quadrant/pelvic discomfort.   Musculoskeletal:         General: Normal range of motion.      Cervical back: Normal range of motion and neck supple.   Skin:     General: Skin is warm and dry.      Coloration: Skin is not pale.      Findings: No erythema or rash.   Neurological:      Mental Status: She is alert and oriented to person, place, and time.    Psychiatric:         Behavior: Behavior normal.      Result Review :  The following data was reviewed by: VARINDER Olivera on 09/21/2023:  Hemoglobin A1C   Date Value Ref Range Status   03/18/2022 5.10 4.80 - 5.60 % Final     Comment:     Hemoglobin A1C Ranges:  Increased Risk for Diabetes  5.7% to 6.4%  Diabetes                     >= 6.5%  Diabetic Goal                < 7.0%     01/12/2017 5.60 4.50 - 5.70 % Final     TSH   Date Value Ref Range Status   01/31/2023 6.740 (H) 0.270 - 4.200 uIU/mL Final   01/12/2017 5.217 (H) 0.550 - 4.780 mIU/mL Final     HDL Cholesterol   Date Value Ref Range Status   01/31/2023 60 40 - 60 mg/dL Final   01/12/2017 70 60 - 100 mg/dL Final     LDL Chol Calc (NIH)   Date Value Ref Range Status   01/31/2023 111 (H) 0 - 100 mg/dL Final     Triglycerides   Date Value Ref Range Status   01/31/2023 94 0 - 150 mg/dL Final   01/12/2017 120 0 - 150 mg/dL Final     Total Cholesterol   Date Value Ref Range Status   01/31/2023 188 0 - 200 mg/dL Final     Comment:     Cholesterol Reference Ranges  (U.S. Department of Health and Human Services ATP III  Classifications)  Desirable          <200 mg/dL  Borderline High    200-239 mg/dL  High Risk          >240 mg/dL  Triglyceride Reference Ranges  (U.S. Department of Health and Human Services ATP III  Classifications)  Normal           <150 mg/dL  Borderline High  150-199 mg/dL  High             200-499 mg/dL  Very High        >500 mg/dL  HDL Reference Ranges  (U.S. Department of Health and Human Services ATP III  Classifications)  Low     <40 mg/dl (major risk factor for CHD)  High    >60 mg/dl ('negative' risk factor for CHD)  LDL Reference Ranges  (U.S. Department of Health and Human Services ATP III  Classifications)  Optimal          <100 mg/dL  Near Optimal     100-129 mg/dL  Borderline High  130-159 mg/dL  High             160-189 mg/dL  Very High        >189 mg/dL             Assessment / Plan         Assessment   Diagnoses and all  orders for this visit:    1. Vaginal discharge (Primary)  2. Suprapubic pain  3. Right adnexal tenderness  Given previous improvement with 3 days of fluconazole, but without complete resolution, will extend therapy for 1 week.  Vaginal culture obtained today and sent for testing.  Obtain pelvic ultrasound given the discharge has had a brown discoloration to rule out any endometrial thickening or ovarian concerns.  Return to clinic if no improvement noted or if symptoms are worsening.   -     fluconazole (DIFLUCAN) 150 MG tablet; Take 1 tablet by mouth Daily.  Dispense: 7 tablet; Refill: 0  -     NuSwab VG, Candida 6sp - Swab, Vagina  -     US Pelvis Complete; Future     New Medications Ordered This Visit   Medications    fluconazole (DIFLUCAN) 150 MG tablet     Sig: Take 1 tablet by mouth Daily.     Dispense:  7 tablet     Refill:  0     Follow Up   Return if symptoms worsen or fail to improve.    Patient was given instructions and counseling regarding her condition or for health maintenance advice. Please see specific information pulled into the AVS if appropriate.       This document has been electronically signed by VARINDER Olivera   September 21, 2023 09:54 EDT    Dictated Utilizing Dragon Dictation: Part of this note may be an electronic transcription/translation of spoken language to printed text using the Dragon Dictation System.

## 2023-09-24 DIAGNOSIS — I10 ESSENTIAL HYPERTENSION: ICD-10-CM

## 2023-09-25 LAB
A VAGINAE DNA VAG QL NAA+PROBE: NORMAL SCORE
BVAB2 DNA VAG QL NAA+PROBE: NORMAL SCORE
C ALBICANS DNA VAG QL NAA+PROBE: NEGATIVE
C GLABRATA DNA VAG QL NAA+PROBE: NEGATIVE
C KRUSEI DNA VAG QL NAA+PROBE: NEGATIVE
C LUSITANIAE DNA VAG QL NAA+PROBE: NEGATIVE
CANDIDA DNA VAG QL NAA+PROBE: NEGATIVE
MEGA1 DNA VAG QL NAA+PROBE: NORMAL SCORE
T VAGINALIS DNA VAG QL NAA+PROBE: NEGATIVE

## 2023-09-25 RX ORDER — DILTIAZEM HYDROCHLORIDE 360 MG/1
360 CAPSULE, EXTENDED RELEASE ORAL DAILY
Qty: 90 CAPSULE | Refills: 3 | Status: SHIPPED | OUTPATIENT
Start: 2023-09-25

## 2023-09-25 NOTE — PROGRESS NOTES
Patient has been notified.    Vss  Neuro intact  Ambulated to restroom  Nauseated, given Zofran  Mobilize  Pt ot   ROSENDA drain empty

## 2023-10-16 ENCOUNTER — HOSPITAL ENCOUNTER (OUTPATIENT)
Dept: ULTRASOUND IMAGING | Facility: HOSPITAL | Age: 73
Discharge: HOME | End: 2023-10-16
Admitting: PHYSICIAN ASSISTANT
Payer: MEDICARE

## 2023-10-16 DIAGNOSIS — R10.2 SUPRAPUBIC PAIN: ICD-10-CM

## 2023-10-16 DIAGNOSIS — R10.2 RIGHT ADNEXAL TENDERNESS: ICD-10-CM

## 2023-10-16 PROCEDURE — 76856 US EXAM PELVIC COMPLETE: CPT

## 2023-10-17 ENCOUNTER — TELEPHONE (OUTPATIENT)
Dept: FAMILY MEDICINE CLINIC | Facility: CLINIC | Age: 73
End: 2023-10-17
Payer: MEDICARE

## 2023-10-17 DIAGNOSIS — R93.89 ABNORMAL PELVIC ULTRASOUND: Primary | ICD-10-CM

## 2023-10-17 NOTE — TELEPHONE ENCOUNTER
Discussed results of pelvic US over the phone with Albino. Discussed recommendations for further evaluation with transvaginal US or MRI as well as pelvic exam. Discussed referral back to her prior GYN as they may be able to complete the pelvic exam and transvaginal US in their office as well as any biopsies if felt to be necessary. She is agreeable. Referral placed.       This document has been electronically signed by VARINDER Olivera   October 17, 2023 15:43 EDT

## 2023-10-24 ENCOUNTER — FLU SHOT (OUTPATIENT)
Dept: FAMILY MEDICINE CLINIC | Facility: CLINIC | Age: 73
End: 2023-10-24
Payer: MEDICARE

## 2023-10-24 DIAGNOSIS — Z23 NEED FOR INFLUENZA VACCINATION: Primary | ICD-10-CM

## 2023-10-24 PROCEDURE — 90662 IIV NO PRSV INCREASED AG IM: CPT | Performed by: GENERAL PRACTICE

## 2023-10-24 PROCEDURE — G0008 ADMIN INFLUENZA VIRUS VAC: HCPCS | Performed by: GENERAL PRACTICE

## 2023-11-09 DIAGNOSIS — J44.9 CHRONIC OBSTRUCTIVE PULMONARY DISEASE, UNSPECIFIED COPD TYPE: ICD-10-CM

## 2023-11-10 RX ORDER — IPRATROPIUM BROMIDE AND ALBUTEROL SULFATE 2.5; .5 MG/3ML; MG/3ML
SOLUTION RESPIRATORY (INHALATION)
Qty: 540 ML | Refills: 5 | Status: SHIPPED | OUTPATIENT
Start: 2023-11-10

## 2023-12-07 ENCOUNTER — TELEPHONE (OUTPATIENT)
Dept: FAMILY MEDICINE CLINIC | Facility: CLINIC | Age: 73
End: 2023-12-07

## 2023-12-07 ENCOUNTER — OFFICE VISIT (OUTPATIENT)
Dept: FAMILY MEDICINE CLINIC | Facility: CLINIC | Age: 73
End: 2023-12-07
Payer: MEDICARE

## 2023-12-07 VITALS
HEIGHT: 61 IN | SYSTOLIC BLOOD PRESSURE: 128 MMHG | WEIGHT: 103 LBS | BODY MASS INDEX: 19.45 KG/M2 | OXYGEN SATURATION: 98 % | HEART RATE: 76 BPM | DIASTOLIC BLOOD PRESSURE: 64 MMHG | TEMPERATURE: 98.6 F | RESPIRATION RATE: 15 BRPM

## 2023-12-07 DIAGNOSIS — K21.9 GASTROESOPHAGEAL REFLUX DISEASE WITHOUT ESOPHAGITIS: ICD-10-CM

## 2023-12-07 DIAGNOSIS — J44.9 COPD MIXED TYPE: ICD-10-CM

## 2023-12-07 DIAGNOSIS — G43.009 MIGRAINE WITHOUT AURA AND WITHOUT STATUS MIGRAINOSUS, NOT INTRACTABLE: ICD-10-CM

## 2023-12-07 DIAGNOSIS — Z87.440 H/O RECURRENT URINARY TRACT INFECTION: ICD-10-CM

## 2023-12-07 DIAGNOSIS — M15.9 PRIMARY OSTEOARTHRITIS INVOLVING MULTIPLE JOINTS: ICD-10-CM

## 2023-12-07 DIAGNOSIS — Z00.00 HEALTHCARE MAINTENANCE: ICD-10-CM

## 2023-12-07 DIAGNOSIS — R73.03 PREDIABETES: ICD-10-CM

## 2023-12-07 DIAGNOSIS — I10 ESSENTIAL HYPERTENSION: ICD-10-CM

## 2023-12-07 DIAGNOSIS — R93.89 ABNORMAL PELVIC ULTRASOUND: ICD-10-CM

## 2023-12-07 DIAGNOSIS — E78.2 MIXED HYPERLIPIDEMIA: Primary | ICD-10-CM

## 2023-12-07 DIAGNOSIS — K58.8 OTHER IRRITABLE BOWEL SYNDROME: ICD-10-CM

## 2023-12-07 DIAGNOSIS — E04.9 GOITER: ICD-10-CM

## 2023-12-07 DIAGNOSIS — R79.89 ELEVATED TSH: ICD-10-CM

## 2023-12-07 DIAGNOSIS — F17.200 SMOKER: ICD-10-CM

## 2023-12-07 DIAGNOSIS — N81.89 PELVIC RELAXATION: ICD-10-CM

## 2023-12-07 DIAGNOSIS — M81.0 AGE-RELATED OSTEOPOROSIS WITHOUT CURRENT PATHOLOGICAL FRACTURE: ICD-10-CM

## 2023-12-07 DIAGNOSIS — I25.10 CORONARY ARTERY CALCIFICATION SEEN ON CT SCAN: ICD-10-CM

## 2023-12-07 DIAGNOSIS — N32.89 BLADDER INSTABILITY: ICD-10-CM

## 2023-12-07 RX ORDER — MONTELUKAST SODIUM 10 MG/1
10 TABLET ORAL NIGHTLY
Qty: 90 TABLET | Refills: 3 | Status: SHIPPED | OUTPATIENT
Start: 2023-12-07

## 2023-12-07 RX ORDER — SPIRONOLACTONE 25 MG/1
25 TABLET ORAL EVERY MORNING
Qty: 90 TABLET | Refills: 3 | Status: SHIPPED | OUTPATIENT
Start: 2023-12-07

## 2023-12-07 RX ORDER — SUMATRIPTAN 100 MG/1
TABLET, FILM COATED ORAL
Qty: 27 TABLET | Refills: 3 | Status: SHIPPED | OUTPATIENT
Start: 2023-12-07

## 2023-12-07 RX ORDER — PANTOPRAZOLE SODIUM 40 MG/1
40 TABLET, DELAYED RELEASE ORAL 2 TIMES DAILY
Qty: 180 TABLET | Refills: 3 | Status: SHIPPED | OUTPATIENT
Start: 2023-12-07

## 2023-12-07 RX ORDER — FLUTICASONE FUROATE, UMECLIDINIUM BROMIDE AND VILANTEROL TRIFENATATE 100; 62.5; 25 UG/1; UG/1; UG/1
1 POWDER RESPIRATORY (INHALATION) DAILY
Qty: 4 EACH | Refills: 0 | COMMUNITY
Start: 2023-12-07

## 2023-12-07 RX ORDER — LANOLIN ALCOHOL/MO/W.PET/CERES
1000 CREAM (GRAM) TOPICAL DAILY
Qty: 90 TABLET | Refills: 3 | Status: SHIPPED | OUTPATIENT
Start: 2023-12-07

## 2023-12-07 RX ORDER — ATORVASTATIN CALCIUM 40 MG/1
40 TABLET, FILM COATED ORAL
Qty: 90 TABLET | Refills: 3 | Status: SHIPPED | OUTPATIENT
Start: 2023-12-07

## 2023-12-07 NOTE — TELEPHONE ENCOUNTER
Relay  Faxed report  Left voicemail with darron, asking she please get with cindy landis regarding scheduling a transvaginal us or anything else...   Ask she please call the office back and ask for me tiffany or stephenie       ----- Message from Stan Alvarado MD sent at 12/7/2023 12:07 PM EST -----  Please forward a copy of her pelvic US from 10/16/23 to Cindy REEDER at [facility] and see if she wants to schedule a transvaginal US or something else

## 2023-12-07 NOTE — PROGRESS NOTES
The ABCs of the Annual Wellness Visit  Subsequent Medicare Wellness Visit    Chief Complaint  Subsequent Medicare Wellness Visit    Subjective    History of Present Illness:  Albino Estrella is a 73 y.o. female who presents for a Subsequent Medicare Wellness Visit.    Gastroesophageal Reflux Disease  She remains heartburn free on pantoprazole.  She continues to deny any difficulty swallowing, regurgitation, vomiting, or abdominal pain    IBS with Diarrhea  She continues to have intermittent diarrhea, but feels that the frequency and severity have returned to baseline.  She has had no problems at night.  With the exception of occasional mild nausea, she continues to deny any associated symptoms.  There is no history of any abdominal pain, hematochezia, or melena, and she continues to deny any fever, chills, or night sweats.  Her last colonoscopy was on 10/26/2018 at which time several hyperplastic polyps were removed.  She remains uninterested in pursuing an updated study at present    COPD  She continues to experience intermittent SOB, cough and wheezing.  There is no history of any chest pain or hemoptysis and she continues to deny any fever, chills, or night sweats. She remains on trelegy and feels that it helps. She continues to use nebulized albuterol 4 times daily. She continues to smoke 1 pack per day. Updated low dose CT of the chest performed on 3/1/2023 was reported as showing COPD, a benign appearing stable 15.1 mm pleural-based RLL nodule, moderate coronary artery calcifications, and a stable goiter with substernal extension.  A one year follow-up was recommended    Essential Hypertension  She admits to dyspnea on exertion.  She continues to deny any chest pain, palpitations, orthopnea, paroxysmal nocturnal dyspnea or peripheral edema.  She remains on diltiazem and spironolactone.     Hyperlipidemia  Her compliance with treatment has been fair.  She remains fairly active about the house.  She remains on  atorvastatin. She has had no recent labs but is fasting this morning    Chronic Low Back Pain  She feels her back pain has remained at baseline. The pain is described as a bilateral ache.  This does not radiate elsewhere and remains unassociated with any other symptoms.  She continues to deny any changes in her strength, sensation, or bowel/bladder control and there is no history of any fever, chills, or night sweats.  The pain is worse with prolonged standing and improves with rest or change of position.    Pelvic Pressure  She was fit for a ring by VARINDER Oropeza) at [facility] on 11/1/23 with a prompt improvement in both her sense of pressure and in her urinary frequency, urgency, dysuria, and incontinence. She continues to deny any hematuria, vaginal bleeding or discharge. She has a history of recurrent urinary tract infections and has had mild intermittent dysuria over the last week or two.  She was tried on tolterodine but stopped it due to a lack of effect and increased fatigue. US of the pelvis performed on 10/16/23 was reported as showing a 2.3 cm area of decreased echogenicity within the cervix    The following portions of the patient's history were reviewed and   updated as appropriate: allergies, current medications, past family history, past medical history, past social history, past surgical history, and problem list.    Compared to one year ago, the patient feels her physical   health is the same.    Compared to one year ago, the patient feels her mental   health is the same.    Recent Hospitalizations:  She was not admitted to the hospital during the last year.     Current Medical Providers:  Patient Care Team:  Stan Alvarado MD as PCP - General  Stan Alvarado MD as PCP - Family Medicine    Outpatient Medications Prior to Visit   Medication Sig Dispense Refill    aspirin 81 MG EC tablet Take 1 tablet by mouth Daily. 30 tablet 5    dilTIAZem (TIAZAC) 360 MG 24 hr  capsule TAKE 1 CAPSULE BY MOUTH DAILY 90 capsule 3    estradiol (ESTRACE) 0.1 MG/GM vaginal cream Insert 2 g into the vagina 2 (Two) Times a Week. 20 g 5    ipratropium-albuterol (DUO-NEB) 0.5-2.5 mg/3 ml nebulizer USE 3 ML VIA NEBULIZER FOUR TIMES DAILY AS NEEDED FOR WHEEZING OR SHORTNESS OF BREATH 540 mL 5    meclizine (Antivert) 25 MG tablet Take 1 tablet by mouth 3 (Three) Times a Day As Needed for Dizziness. 90 tablet 5    Zoster Vac Recomb Adjuvanted (Shingrix) 50 MCG/0.5ML reconstituted suspension Inject 0.5 mL into the appropriate muscle as directed by prescriber See Admin Instructions. Repeat in 2-6 months 1 each 1    atorvastatin (LIPITOR) 40 MG tablet Take 1 tablet by mouth every night at bedtime. 30 tablet 5    denosumab (PROLIA) 60 MG/ML solution prefilled syringe syringe Inject 1 mL under the skin into the appropriate area as directed Every 6 (Six) Months. 1 each 5    fluconazole (DIFLUCAN) 150 MG tablet Take 1 tablet by mouth Daily. 7 tablet 0    Fluticasone-Umeclidin-Vilant (Trelegy Ellipta) 100-62.5-25 MCG/ACT inhaler INHALE 1 PUFF BY MOUTH DAILY 60 each 5    montelukast (SINGULAIR) 10 MG tablet Take 1 tablet by mouth Every Night. 30 tablet 5    pantoprazole (PROTONIX) 40 MG EC tablet Take 1 tablet by mouth 2 (Two) Times a Day. 60 tablet 5    spironolactone (ALDACTONE) 25 MG tablet Take 1 tablet by mouth Every Morning. 30 tablet 5    SUMAtriptan (IMITREX) 100 MG tablet Take one tablet at onset of headache. May repeat dose one time in 2 hours if headache not relieved. 9 tablet 5    vitamin B-12 (CYANOCOBALAMIN) 1000 MCG tablet Take 1 tablet by mouth Daily. 30 tablet 5     No facility-administered medications prior to visit.     No opioid medication identified on active medication list. I have reviewed chart for other potential  high risk medication/s and harmful drug interactions in the elderly.        Aspirin is on active medication list. Aspirin use is indicated based on review of current medical  condition/s. Pros and cons of this therapy have been discussed today. Benefits of this medication outweigh potential harm.  Patient has been encouraged to continue taking this medication.  .    Patient Active Problem List   Diagnosis    Bladder instability    COPD mixed type    Dyspareunia    Gastroesophageal reflux disease without esophagitis    H/O recurrent urinary tract infection    Mixed hyperlipidemia    Essential hypertension    IBS (irritable bowel syndrome)    Prediabetes    Primary osteoarthritis    Migraine without aura and without status migrainosus, not intractable    Encounter for immunization    Healthcare maintenance    Smoker    Pelvic relaxation    Age-related osteoporosis without current pathological fracture    Goiter    Elevated TSH    Coronary artery calcification seen on CT scan    Mechanical back pain    Encounter for screening mammogram for breast cancer    Abnormal pelvic ultrasound     Advance Care Planning  Advance Directive is not on file.  ACP discussion was held with the patient during this visit. Patient does not have an advance directive, information provided.    Review of Systems   Constitutional:  Positive for fatigue. Negative for appetite change, chills and fever.   HENT:  Positive for congestion and rhinorrhea. Negative for ear pain, postnasal drip, sneezing, sore throat and voice change.    Eyes:  Negative for visual disturbance.   Respiratory:  Positive for cough, shortness of breath and wheezing.    Cardiovascular:  Negative for chest pain, palpitations and leg swelling.   Gastrointestinal:  Negative for abdominal pain, blood in stool, constipation, diarrhea, nausea and vomiting.   Genitourinary:  Positive for frequency, pelvic pain (pressure) and urgency. Negative for dysuria and hematuria.   Musculoskeletal:  Positive for back pain. Negative for arthralgias, joint swelling, myalgias and neck pain.   Skin:  Negative for rash.   Neurological:  Positive for dizziness. Negative  "for weakness and numbness.   Psychiatric/Behavioral:  Negative for dysphoric mood and sleep disturbance. The patient is not nervous/anxious.         Objective    Vitals:    12/07/23 0948   BP: 128/64   Pulse: 76   Resp: 15   Temp: 98.6 °F (37 °C)   TempSrc: Temporal   SpO2: 98%   Weight: 46.7 kg (103 lb)   Height: 154.9 cm (61\")     Estimated body mass index is 19.46 kg/m² as calculated from the following:    Height as of this encounter: 154.9 cm (61\").    Weight as of this encounter: 46.7 kg (103 lb).    BMI is within normal parameters. No other follow-up for BMI required.    Does the patient have evidence of cognitive impairment? No    Physical Exam  Constitutional:       General: She is not in acute distress.     Appearance: Normal appearance. She is well-developed. She is not diaphoretic.      Comments: Accompanied by her . Bright and in fair spirits. No apparent distress. No pallor, jaundice, diaphoresis, or cyanosis.   HENT:      Head: Atraumatic.      Right Ear: Tympanic membrane, ear canal and external ear normal. There is impacted cerumen.      Left Ear: Tympanic membrane, ear canal and external ear normal. There is impacted cerumen.      Mouth/Throat:      Lips: No lesions.      Mouth: Mucous membranes are moist. No oral lesions.      Pharynx: No oropharyngeal exudate or posterior oropharyngeal erythema.   Eyes:      General: Lids are normal.      Extraocular Movements: Extraocular movements intact.      Conjunctiva/sclera: Conjunctivae normal.      Pupils: Pupils are equal.   Neck:      Thyroid: Thyromegaly (right lobe more prominent then the left) present. No thyroid mass.      Vascular: No carotid bruit or JVD.      Trachea: Trachea normal. No tracheal deviation.   Cardiovascular:      Rate and Rhythm: Normal rate and regular rhythm.      Heart sounds: Normal heart sounds, S1 normal and S2 normal. No murmur heard.     No gallop.   Pulmonary:      Effort: Pulmonary effort is normal.      Breath " sounds: Normal breath sounds. Examination of the right-lower field reveals decreased breath sounds. Examination of the left-lower field reveals decreased breath sounds. Decreased breath sounds and wheezing (diffuse - mild) present. No rales.      Comments: Pulmonary hyperinflation  Abdominal:      General: Bowel sounds are normal. There is no distension or abdominal bruit.      Palpations: Abdomen is soft. There is no hepatomegaly, splenomegaly or mass.      Tenderness: There is no abdominal tenderness.      Hernia: No hernia is present.   Musculoskeletal:      Right lower leg: No edema.      Left lower leg: No edema.   Lymphadenopathy:      Head:      Right side of head: No submental, submandibular, tonsillar, preauricular, posterior auricular or occipital adenopathy.      Left side of head: No submental, submandibular, tonsillar, preauricular, posterior auricular or occipital adenopathy.      Cervical: No cervical adenopathy.      Upper Body:      Right upper body: No supraclavicular adenopathy.      Left upper body: No supraclavicular adenopathy.   Skin:     General: Skin is warm.      Coloration: Skin is not cyanotic, jaundiced or pale.      Findings: No rash.      Nails: There is no clubbing.   Neurological:      Mental Status: She is alert and oriented to person, place, and time.      Cranial Nerves: No cranial nerve deficit, dysarthria or facial asymmetry.      Sensory: No sensory deficit.      Motor: No tremor.      Coordination: Coordination normal.      Gait: Gait normal.   Psychiatric:         Attention and Perception: Attention normal.         Mood and Affect: Mood normal.         Speech: Speech normal.         Behavior: Behavior normal.         Thought Content: Thought content normal.       HEALTH RISK ASSESSMENT    Smoking Status:  Social History     Tobacco Use   Smoking Status Every Day    Packs/day: 1.00    Years: 43.00    Additional pack years: 0.00    Total pack years: 43.00    Types: Cigarettes     Passive exposure: Current   Smokeless Tobacco Never     Alcohol Consumption:  Social History     Substance and Sexual Activity   Alcohol Use No     Fall Risk Screen:  ALINE Fall Risk Assessment was completed, and patient is at LOW risk for falls.Assessment completed on:2023    Depression Screenin/7/2023     9:45 AM   PHQ-2/PHQ-9 Depression Screening   Little Interest or Pleasure in Doing Things 0-->not at all   Feeling Down, Depressed or Hopeless 0-->not at all   PHQ-9: Brief Depression Severity Measure Score 0     Health Habits and Functional and Cognitive Screenin/7/2023     9:45 AM   Functional & Cognitive Status   Do you have difficulty preparing food and eating? No   Do you have difficulty bathing yourself, getting dressed or grooming yourself? No   Do you have difficulty using the toilet? No   Do you have difficulty moving around from place to place? No   Do you have trouble with steps or getting out of a bed or a chair? No   Current Diet Well Balanced Diet   Dental Exam Not up to date   Eye Exam Not up to date   Exercise (times per week) 0 times per week   Current Exercises Include No Regular Exercise   Do you need help using the phone?  No   Are you deaf or do you have serious difficulty hearing?  No   Do you need help to go to places out of walking distance? No   Do you need help shopping? No   Do you need help preparing meals?  No   Do you need help with housework?  No   Do you need help with laundry? No   Do you need help taking your medications? No   Do you need help managing money? No   Do you ever drive or ride in a car without wearing a seat belt? No   Have you felt unusual stress, anger or loneliness in the last month? No   Who do you live with? Spouse   If you need help, do you have trouble finding someone available to you? No   Have you been bothered in the last four weeks by sexual problems? No   Do you have difficulty concentrating, remembering or making decisions? No      Age-appropriate Screening Schedule:  Refer to the list below for future screening recommendations based on patient's age, sex and/or medical conditions. Orders for these recommended tests are listed in the plan section. The patient has been provided with a written plan.    Health Maintenance   Topic Date Due    ZOSTER VACCINE (1 of 2) Never done    PAP SMEAR  06/13/2017    DXA SCAN  11/19/2022    COVID-19 Vaccine (4 - 2023-24 season) 09/01/2023    LIPID PANEL  01/31/2024    LUNG CANCER SCREENING  03/01/2024    TDAP/TD VACCINES (2 - Td or Tdap) 09/22/2024    ANNUAL WELLNESS VISIT  12/07/2024    MAMMOGRAM  08/28/2025    COLORECTAL CANCER SCREENING  10/26/2028    HEPATITIS C SCREENING  Completed    INFLUENZA VACCINE  Completed    Pneumococcal Vaccine 65+  Completed        Assessment & Plan   CMS Preventative Services Quick Reference  Risk Factors Identified During Encounter  Chronic Pain: Chronic Pain Educational material Discussed and Shared in After Visit Summary for Patient.  Immunizations Discussed/Encouraged: Tdap, Shingrix, COVID19, and RSV (Respiratory Syncytial Virus)  Tobacco Use/Dependance Risk (use dotphrase .tobaccocessation for documentation)  The above risks/problems have been discussed with the patient.  Follow up actions/plans if indicated are seen below in the Assessment/Plan Section.  Pertinent information has been shared with the patient in the After Visit Summary.    Diagnoses and all orders for this visit:    1. Mixed hyperlipidemia   Encouraged to continue to work on her diet and exercise plan.  Continue current medication  Updated labs drawn  -     Comprehensive Metabolic Panel  -     Lipid Panel  -     TSH  -     atorvastatin (LIPITOR) 40 MG tablet; Take 1 tablet by mouth every night at bedtime.  Dispense: 90 tablet; Refill: 3    2. Essential hypertension  As above.   Continue current medication  -     CBC & Differential  -     Comprehensive Metabolic Panel  -     TSH  -     spironolactone  (ALDACTONE) 25 MG tablet; Take 1 tablet by mouth Every Morning.  Dispense: 90 tablet; Refill: 3    3. Coronary artery calcification seen on CT scan  Reminded regarding risk factor modification with an emphasis on tobacco cessation.  Continue low-dose ASA  -     CBC & Differential    4. Prediabetes  As above.  Will continue to monitor  -     Comprehensive Metabolic Panel  -     TSH  -     Hemoglobin A1c    5. Goiter    6. Other irritable bowel syndrome  -     CBC & Differential    7. Gastroesophageal reflux disease without esophagitis  -     CBC & Differential  -     pantoprazole (PROTONIX) 40 MG EC tablet; Take 1 tablet by mouth 2 (Two) Times a Day.  Dispense: 180 tablet; Refill: 3    8. Pelvic relaxation  Follow up with gynecology    9. H/O recurrent urinary tract infection    10. Bladder instability    11. Healthcare maintenance  Patient has already received a flu shot fall.  Recommended an updated COVID-19 and a RSV vaccination.  Reminded that she is due for shingrix, and that she will be due for an updated Tdap next year  Will arrange an updated LDCT of the chest at her return  -     vitamin B-12 (CYANOCOBALAMIN) 1000 MCG tablet; Take 1 tablet by mouth Daily.  Dispense: 90 tablet; Refill: 3    12. Primary osteoarthritis involving multiple joints    13. Age-related osteoporosis without current pathological fracture  Encouraged to continue to pursue weight bearing activities while exercising joint protection.  Continue current medication  -     Vitamin D,25-Hydroxy  -     denosumab (PROLIA) 60 MG/ML solution prefilled syringe syringe; Inject 1 mL under the skin into the appropriate area as directed Every 6 (Six) Months.  Dispense: 1 each; Refill: 5    14. Migraine without aura and without status migrainosus, not intractable  -     SUMAtriptan (IMITREX) 100 MG tablet; Take one tablet at onset of headache. May repeat dose one time in 2 hours if headache not relieved.  Dispense: 27 tablet; Refill: 3    15. COPD mixed  type   COPD is stable.  Reminded of the importance of smoking cessation  Encouraged to remain as active as symptoms allow for  Continue current medication  -     CBC & Differential  -     Fluticasone-Umeclidin-Vilant (Trelegy Ellipta) 100-62.5-25 MCG/ACT inhaler; Inhale 1 puff Daily.  Dispense: 4 each; Refill: 0    16. Elevated TSH  -     TSH    17. Smoker  Lengthy discussion regarding the potential sequela of continued tobacco use and the options with respect to cessation.  Patient uninterested in pursuing at present but will consider.    18. Abnormal pelvic ultrasound  New problem  Recommended a transvaginal US. Patient would like this done through her GYN and we will try to arrange    Follow Up:   Return in about 3 months (around 3/8/2024).     An After Visit Summary and PPPS were made available to the patient.

## 2023-12-08 LAB
25(OH)D3+25(OH)D2 SERPL-MCNC: 42.9 NG/ML (ref 30–100)
ALBUMIN SERPL-MCNC: 4.5 G/DL (ref 3.5–5.2)
ALBUMIN/GLOB SERPL: 1.4 G/DL
ALP SERPL-CCNC: 69 U/L (ref 39–117)
ALT SERPL-CCNC: 16 U/L (ref 1–33)
AST SERPL-CCNC: 17 U/L (ref 1–32)
BASOPHILS # BLD AUTO: 0.07 10*3/MM3 (ref 0–0.2)
BASOPHILS NFR BLD AUTO: 0.6 % (ref 0–1.5)
BILIRUB SERPL-MCNC: 0.6 MG/DL (ref 0–1.2)
BUN SERPL-MCNC: 11 MG/DL (ref 8–23)
BUN/CREAT SERPL: 14.5 (ref 7–25)
CALCIUM SERPL-MCNC: 9.4 MG/DL (ref 8.6–10.5)
CHLORIDE SERPL-SCNC: 102 MMOL/L (ref 98–107)
CHOLEST SERPL-MCNC: 165 MG/DL (ref 0–200)
CO2 SERPL-SCNC: 21.9 MMOL/L (ref 22–29)
CREAT SERPL-MCNC: 0.76 MG/DL (ref 0.57–1)
EGFRCR SERPLBLD CKD-EPI 2021: 82.9 ML/MIN/1.73
EOSINOPHIL # BLD AUTO: 0.17 10*3/MM3 (ref 0–0.4)
EOSINOPHIL NFR BLD AUTO: 1.5 % (ref 0.3–6.2)
ERYTHROCYTE [DISTWIDTH] IN BLOOD BY AUTOMATED COUNT: 12.5 % (ref 12.3–15.4)
GLOBULIN SER CALC-MCNC: 3.3 GM/DL
GLUCOSE SERPL-MCNC: 94 MG/DL (ref 65–99)
HBA1C MFR BLD: 5.5 % (ref 4.8–5.6)
HCT VFR BLD AUTO: 39.4 % (ref 34–46.6)
HDLC SERPL-MCNC: 49 MG/DL (ref 40–60)
HGB BLD-MCNC: 13.3 G/DL (ref 12–15.9)
IMM GRANULOCYTES # BLD AUTO: 0.06 10*3/MM3 (ref 0–0.05)
IMM GRANULOCYTES NFR BLD AUTO: 0.5 % (ref 0–0.5)
LDLC SERPL CALC-MCNC: 88 MG/DL (ref 0–100)
LYMPHOCYTES # BLD AUTO: 4.4 10*3/MM3 (ref 0.7–3.1)
LYMPHOCYTES NFR BLD AUTO: 39.4 % (ref 19.6–45.3)
MCH RBC QN AUTO: 31.7 PG (ref 26.6–33)
MCHC RBC AUTO-ENTMCNC: 33.8 G/DL (ref 31.5–35.7)
MCV RBC AUTO: 93.8 FL (ref 79–97)
MONOCYTES # BLD AUTO: 0.43 10*3/MM3 (ref 0.1–0.9)
MONOCYTES NFR BLD AUTO: 3.8 % (ref 5–12)
NEUTROPHILS # BLD AUTO: 6.04 10*3/MM3 (ref 1.7–7)
NEUTROPHILS NFR BLD AUTO: 54.2 % (ref 42.7–76)
NRBC BLD AUTO-RTO: 0.1 /100 WBC (ref 0–0.2)
PLATELET # BLD AUTO: 291 10*3/MM3 (ref 140–450)
POTASSIUM SERPL-SCNC: 4.5 MMOL/L (ref 3.5–5.2)
PROT SERPL-MCNC: 7.8 G/DL (ref 6–8.5)
RBC # BLD AUTO: 4.2 10*6/MM3 (ref 3.77–5.28)
SODIUM SERPL-SCNC: 137 MMOL/L (ref 136–145)
TRIGL SERPL-MCNC: 165 MG/DL (ref 0–150)
TSH SERPL DL<=0.005 MIU/L-ACNC: 6.43 UIU/ML (ref 0.27–4.2)
VLDLC SERPL CALC-MCNC: 28 MG/DL (ref 5–40)
WBC # BLD AUTO: 11.17 10*3/MM3 (ref 3.4–10.8)

## 2023-12-15 RX ORDER — ONDANSETRON 4 MG/1
4 TABLET, ORALLY DISINTEGRATING ORAL EVERY 8 HOURS PRN
Qty: 30 TABLET | Refills: 0 | Status: SHIPPED | OUTPATIENT
Start: 2023-12-15

## 2024-01-12 DIAGNOSIS — N88.9 CERVICAL LESION: ICD-10-CM

## 2024-01-12 DIAGNOSIS — R93.89 ABNORMAL PELVIC ULTRASOUND: Primary | ICD-10-CM

## 2024-01-29 ENCOUNTER — HOSPITAL ENCOUNTER (OUTPATIENT)
Dept: ULTRASOUND IMAGING | Facility: HOSPITAL | Age: 74
Discharge: HOME | End: 2024-01-29
Admitting: GENERAL PRACTICE
Payer: MEDICARE

## 2024-01-29 ENCOUNTER — TELEPHONE (OUTPATIENT)
Dept: FAMILY MEDICINE CLINIC | Facility: CLINIC | Age: 74
End: 2024-01-29
Payer: MEDICARE

## 2024-01-29 DIAGNOSIS — N88.9 CERVICAL LESION: ICD-10-CM

## 2024-01-29 DIAGNOSIS — R93.89 ABNORMAL PELVIC ULTRASOUND: ICD-10-CM

## 2024-01-29 PROCEDURE — 76830 TRANSVAGINAL US NON-OB: CPT | Performed by: RADIOLOGY

## 2024-01-29 PROCEDURE — 76830 TRANSVAGINAL US NON-OB: CPT

## 2024-01-29 NOTE — TELEPHONE ENCOUNTER
FAXED     ----- Message from Stan Alvarado MD sent at 1/29/2024  1:42 PM EST -----  Please forward results to [facility]

## 2024-02-17 DIAGNOSIS — N39.0 RECURRENT UTI: ICD-10-CM

## 2024-02-19 RX ORDER — MONTELUKAST SODIUM 10 MG/1
10 TABLET ORAL NIGHTLY
Qty: 30 TABLET | Refills: 5 | Status: SHIPPED | OUTPATIENT
Start: 2024-02-19

## 2024-02-19 RX ORDER — ESTRADIOL 0.1 MG/G
CREAM VAGINAL
Qty: 42.5 G | Refills: 5 | Status: SHIPPED | OUTPATIENT
Start: 2024-02-19

## 2024-03-08 ENCOUNTER — OFFICE VISIT (OUTPATIENT)
Dept: FAMILY MEDICINE CLINIC | Facility: CLINIC | Age: 74
End: 2024-03-08
Payer: MEDICARE

## 2024-03-08 DIAGNOSIS — Z87.440 H/O RECURRENT URINARY TRACT INFECTION: ICD-10-CM

## 2024-03-08 DIAGNOSIS — R73.03 PREDIABETES: ICD-10-CM

## 2024-03-08 DIAGNOSIS — R93.89 ABNORMAL PELVIC ULTRASOUND: ICD-10-CM

## 2024-03-08 DIAGNOSIS — N81.89 PELVIC RELAXATION: ICD-10-CM

## 2024-03-08 DIAGNOSIS — K21.9 GASTROESOPHAGEAL REFLUX DISEASE WITHOUT ESOPHAGITIS: ICD-10-CM

## 2024-03-08 DIAGNOSIS — I10 ESSENTIAL HYPERTENSION: ICD-10-CM

## 2024-03-08 DIAGNOSIS — N32.89 BLADDER INSTABILITY: ICD-10-CM

## 2024-03-08 DIAGNOSIS — Z87.891 PERSONAL HISTORY OF NICOTINE DEPENDENCE: ICD-10-CM

## 2024-03-08 DIAGNOSIS — M81.0 AGE-RELATED OSTEOPOROSIS WITHOUT CURRENT PATHOLOGICAL FRACTURE: ICD-10-CM

## 2024-03-08 DIAGNOSIS — G43.009 MIGRAINE WITHOUT AURA AND WITHOUT STATUS MIGRAINOSUS, NOT INTRACTABLE: ICD-10-CM

## 2024-03-08 DIAGNOSIS — F17.200 SMOKER: ICD-10-CM

## 2024-03-08 DIAGNOSIS — E04.9 GOITER: ICD-10-CM

## 2024-03-08 DIAGNOSIS — E78.2 MIXED HYPERLIPIDEMIA: Primary | ICD-10-CM

## 2024-03-08 DIAGNOSIS — R79.89 ELEVATED TSH: ICD-10-CM

## 2024-03-08 DIAGNOSIS — Z00.00 HEALTHCARE MAINTENANCE: ICD-10-CM

## 2024-03-08 DIAGNOSIS — I25.10 CORONARY ARTERY CALCIFICATION SEEN ON CT SCAN: ICD-10-CM

## 2024-03-08 DIAGNOSIS — J44.9 COPD MIXED TYPE: ICD-10-CM

## 2024-03-08 DIAGNOSIS — M15.9 PRIMARY OSTEOARTHRITIS INVOLVING MULTIPLE JOINTS: ICD-10-CM

## 2024-03-08 DIAGNOSIS — K58.8 OTHER IRRITABLE BOWEL SYNDROME: ICD-10-CM

## 2024-03-08 NOTE — PROGRESS NOTES
Aicha Estrella is a 73 y.o. female.     Chief Complaint  She returns for a scheduled reassessment of multiple medical problems including GERD, IBS with diarrhea, COPD, essential hypertension, hyperlipidemia, chronic low back pain, and pelvic floor relaxation    History of Present Illness     Gastroesophageal Reflux Disease  She remains heartburn free on pantoprazole.  She continues to deny any difficulty swallowing, regurgitation, vomiting, or abdominal pain  Lab Results   Component Value Date    WBC 11.17 (H) 12/07/2023    HGB 13.3 12/07/2023    HCT 39.4 12/07/2023    MCV 93.8 12/07/2023     12/07/2023     IBS with Diarrhea  She continues to have intermittent diarrhea, but feels that the frequency and severity have returned to baseline.  She has had no problems at night.  With the exception of occasional mild nausea, she continues to deny any associated symptoms.  There is no history of any abdominal pain, hematochezia, or melena, and she continues to deny any fever, chills, or night sweats.  Her last colonoscopy was on 10/26/2018 at which time several hyperplastic polyps were removed.  She remains uninterested in pursuing an updated study at present    COPD  She continues to experience intermittent SOB, cough and wheezing.  There is no history of any chest pain or hemoptysis and she continues to deny any fever, chills, or night sweats. She remains on trelegy and feels that it helps. She continues to use nebulized albuterol 4 times daily. She continues to smoke 1 pack per day. Updated low dose CT of the chest performed on 3/1/2023 was reported as showing COPD, a benign appearing stable 15.1 mm pleural-based RLL nodule, moderate coronary artery calcifications, and a stable goiter with substernal extension.  A one year follow-up was recommended    Essential Hypertension  She admits to dyspnea on exertion.  She continues to deny any chest pain, palpitations, orthopnea, paroxysmal nocturnal dyspnea  or peripheral edema.  She remains on diltiazem and spironolactone.   Lab Results   Component Value Date    GLUCOSE 94 12/07/2023    BUN 11 12/07/2023    CREATININE 0.76 12/07/2023    EGFRRESULT 82.9 12/07/2023    BCR 14.5 12/07/2023    K 4.5 12/07/2023    CO2 21.9 (L) 12/07/2023    CALCIUM 9.4 12/07/2023    PROTENTOTREF 7.8 12/07/2023    ALBUMIN 4.5 12/07/2023    BILITOT 0.6 12/07/2023    AST 17 12/07/2023    ALT 16 12/07/2023     Lab Results   Component Value Date    ALKPHOS 69 12/07/2023    ALKPHOS 81 01/12/2017     Hyperlipidemia  Her compliance with treatment has been fair.  She remains fairly active about the house.  She remains on atorvastatin.   Lab Results   Component Value Date    CHOL 197 01/12/2017    CHLPL 165 12/07/2023    TRIG 165 (H) 12/07/2023    HDL 49 12/07/2023    LDL 88 12/07/2023     Chronic Low Back Pain  She feels her back pain has remained at baseline. The pain is described as a bilateral ache.  This does not radiate elsewhere and remains unassociated with any other symptoms.  She continues to deny any changes in her strength, sensation, or bowel/bladder control and there is no history of any fever, chills, or night sweats.  The pain is worse with prolonged standing and improves with rest or change of position.    Pelvic Pressure  She was fit for a ring by VARINDER OropezaC) at [facility] on 11/1/23 with a prompt improvement in both her sense of pressure and in her urinary frequency, urgency, dysuria, and incontinence. She continues to deny any hematuria, vaginal bleeding or discharge. She has a history of recurrent urinary tract infections and has had mild intermittent dysuria over the last week or two.  She was tried on tolterodine but stopped it due to a lack of effect and increased fatigue. US of the pelvis performed on 10/16/23 was reported as showing a 2.3 cm area of decreased echogenicity within the cervix.  Transvaginal ultrasound performed on 1/29/2024 was reported as  showing a hyperechoic focus adjacent to the cervix.  Direct visualization was recommended.  A copy of the report was forwarded to her GYN.  She has an appointment with her GYN next week and has also been provided with a copy to bring with her.    Labs  Most recent vitamin D 42.9  Lab Results   Component Value Date    HGBA1C 5.50 12/07/2023     Lab Results   Component Value Date    TSH 6.430 (H) 12/07/2023     The following portions of the patient's history were reviewed and updated as appropriate: allergies, current medications, past medical history, past social history, and problem list.    Review of Systems   Constitutional:  Positive for fatigue. Negative for chills and fever.   HENT:  Positive for congestion, rhinorrhea and sneezing. Negative for ear pain, sore throat and voice change.    Eyes:  Negative for visual disturbance.   Respiratory:  Positive for cough, shortness of breath and wheezing.    Cardiovascular:  Negative for chest pain, palpitations and leg swelling.   Gastrointestinal:  Positive for diarrhea and nausea. Negative for abdominal pain, blood in stool, constipation and vomiting.   Genitourinary:  Positive for frequency, pelvic pressure and urgency. Negative for dysuria, hematuria, vaginal bleeding and vaginal pain.   Musculoskeletal:  Positive for back pain. Negative for arthralgias, joint swelling and myalgias.   Skin:  Negative for rash.   Neurological:  Positive for dizziness (occasional). Negative for weakness and numbness.   Psychiatric/Behavioral:  Negative for sleep disturbance and depressed mood. The patient is not nervous/anxious.      Objective   Physical Exam  Constitutional:       General: She is not in acute distress.     Appearance: Normal appearance. She is well-developed. She is not diaphoretic.      Comments: Accompanied by her . Bright and in fair spirits. No apparent distress. No pallor, jaundice, diaphoresis, or cyanosis.   HENT:      Head: Atraumatic.      Right Ear:  Tympanic membrane, ear canal and external ear normal. There is impacted cerumen.      Left Ear: Tympanic membrane, ear canal and external ear normal. There is impacted cerumen.      Mouth/Throat:      Lips: No lesions.      Mouth: Mucous membranes are moist. No oral lesions.      Pharynx: No oropharyngeal exudate or posterior oropharyngeal erythema.   Eyes:      General: Lids are normal.      Extraocular Movements: Extraocular movements intact.      Conjunctiva/sclera: Conjunctivae normal.      Pupils: Pupils are equal.   Neck:      Thyroid: Thyromegaly (right lobe more prominent then the left) present. No thyroid mass.      Vascular: No carotid bruit or JVD.      Trachea: Trachea normal. No tracheal deviation.   Cardiovascular:      Rate and Rhythm: Normal rate and regular rhythm.      Heart sounds: Normal heart sounds, S1 normal and S2 normal. No murmur heard.     No gallop.   Pulmonary:      Effort: Pulmonary effort is normal.      Breath sounds: Examination of the right-lower field reveals decreased breath sounds. Examination of the left-lower field reveals decreased breath sounds. Decreased breath sounds and wheezing (diffuse - mild) present. No rales.      Comments: Pulmonary hyperinflation  Abdominal:      General: Bowel sounds are normal. There is no distension.   Musculoskeletal:      Right lower leg: No edema.      Left lower leg: No edema.   Lymphadenopathy:      Head:      Right side of head: No submental, submandibular, tonsillar, preauricular, posterior auricular or occipital adenopathy.      Left side of head: No submental, submandibular, tonsillar, preauricular, posterior auricular or occipital adenopathy.      Cervical: No cervical adenopathy.      Upper Body:      Right upper body: No supraclavicular adenopathy.      Left upper body: No supraclavicular adenopathy.   Skin:     General: Skin is warm.      Coloration: Skin is not cyanotic, jaundiced or pale.      Findings: No rash.      Nails: There  is no clubbing.   Neurological:      Mental Status: She is alert and oriented to person, place, and time.      Cranial Nerves: No cranial nerve deficit, dysarthria or facial asymmetry.      Sensory: No sensory deficit.      Motor: No tremor.      Coordination: Coordination normal.      Gait: Gait normal.   Psychiatric:         Attention and Perception: Attention normal.         Mood and Affect: Mood normal.         Speech: Speech normal.         Behavior: Behavior normal.         Thought Content: Thought content normal.       Assessment & Plan   Problems Addressed this Visit          Cardiac and Vasculature    Coronary artery calcification seen on CT scan  Reminded regarding risk factor modification with an emphasis on tobacco cessation.  Continue low-dose ASA.    Essential hypertension   Hypertension: at goal. Evidence of target organ damage:  coronary artery calcifications on previous imaging .  Encouraged to continue to work on diet and exercise plan.   Continue current medication    Mixed hyperlipidemia  As above.   Continue current medication.       Endocrine and Metabolic    Goiter    Prediabetes  As above.  Will continue to monitor       Gastrointestinal Abdominal     Gastroesophageal reflux disease without esophagitis  Reminded regarding lifestyle modification  Continue current medication    IBS (irritable bowel syndrome)  As above.       Genitourinary and Reproductive     Bladder instability    H/O recurrent urinary tract infection    Pelvic relaxation  Follow up with gynecology       Health Encounters    Healthcare maintenance  Recommended RSV and an updated COVID-19 vaccination.  Reminded that she is due for Shingrix  Will arrange an updated low-dose CT of the chest  Will discuss an updated mammogram and Tdap at her return    Relevant Orders     CT Chest Low Dose Cancer Screening WO       Musculoskeletal and Injuries    Age-related osteoporosis without current pathological fracture    Primary  osteoarthritis       Neuro    Migraine without aura and without status migrainosus, not intractable       Pulmonary and Pneumonias    COPD mixed type   COPD is stable.  Reminded of the importance of smoking cessation  Encouraged to remain as active as symptoms allow for  Continue current medication       Symptoms and Signs    Abnormal pelvic ultrasound  Follow up with gynecology    Elevated TSH       Tobacco    Smoker    Relevant Orders     CT Chest Low Dose Cancer Screening WO     Other Visit Diagnoses       Personal history of nicotine dependence        Relevant Orders     CT Chest Low Dose Cancer Screening WO          Diagnoses         Codes Comments    Mixed hyperlipidemia    -  Primary ICD-10-CM: E78.2  ICD-9-CM: 272.2     Essential hypertension     ICD-10-CM: I10  ICD-9-CM: 401.9     Coronary artery calcification seen on CT scan     ICD-10-CM: I25.10  ICD-9-CM: 414.00     Goiter     ICD-10-CM: E04.9  ICD-9-CM: 240.9     Other irritable bowel syndrome     ICD-10-CM: K58.8  ICD-9-CM: 564.1     Prediabetes     ICD-10-CM: R73.03  ICD-9-CM: 790.29     Gastroesophageal reflux disease without esophagitis     ICD-10-CM: K21.9  ICD-9-CM: 530.81     Pelvic relaxation     ICD-10-CM: N81.89  ICD-9-CM: 618.89     H/O recurrent urinary tract infection     ICD-10-CM: Z87.440  ICD-9-CM: V13.02     Bladder instability     ICD-10-CM: N32.89  ICD-9-CM: 596.89     Healthcare maintenance     ICD-10-CM: Z00.00  ICD-9-CM: V70.0     Primary osteoarthritis involving multiple joints     ICD-10-CM: M15.9  ICD-9-CM: 715.98     Age-related osteoporosis without current pathological fracture     ICD-10-CM: M81.0  ICD-9-CM: 733.01     Migraine without aura and without status migrainosus, not intractable     ICD-10-CM: G43.009  ICD-9-CM: 346.10     COPD mixed type     ICD-10-CM: J44.9  ICD-9-CM: 496     Elevated TSH     ICD-10-CM: R79.89  ICD-9-CM: 794.5     Smoker     ICD-10-CM: F17.200  ICD-9-CM: 305.1     Abnormal pelvic ultrasound      ICD-10-CM: R93.89  ICD-9-CM: 793.5     Personal history of nicotine dependence     ICD-10-CM: Z87.891  ICD-9-CM: V15.82

## 2024-03-09 VITALS
SYSTOLIC BLOOD PRESSURE: 120 MMHG | DIASTOLIC BLOOD PRESSURE: 60 MMHG | TEMPERATURE: 98.6 F | OXYGEN SATURATION: 95 % | RESPIRATION RATE: 15 BRPM | HEIGHT: 61 IN | HEART RATE: 67 BPM | WEIGHT: 107 LBS | BODY MASS INDEX: 20.2 KG/M2

## 2024-03-26 ENCOUNTER — HOSPITAL ENCOUNTER (OUTPATIENT)
Dept: CT IMAGING | Facility: HOSPITAL | Age: 74
Discharge: HOME | End: 2024-03-26
Admitting: GENERAL PRACTICE
Payer: MEDICARE

## 2024-03-26 DIAGNOSIS — F17.200 SMOKER: ICD-10-CM

## 2024-03-26 DIAGNOSIS — Z00.00 HEALTHCARE MAINTENANCE: ICD-10-CM

## 2024-03-26 DIAGNOSIS — Z87.891 PERSONAL HISTORY OF NICOTINE DEPENDENCE: ICD-10-CM

## 2024-03-26 PROCEDURE — 71271 CT THORAX LUNG CANCER SCR C-: CPT | Performed by: RADIOLOGY

## 2024-03-26 PROCEDURE — 71271 CT THORAX LUNG CANCER SCR C-: CPT

## 2024-04-17 DIAGNOSIS — J44.9 COPD MIXED TYPE: ICD-10-CM

## 2024-04-17 RX ORDER — FLUTICASONE FUROATE, UMECLIDINIUM BROMIDE AND VILANTEROL TRIFENATATE 100; 62.5; 25 UG/1; UG/1; UG/1
1 POWDER RESPIRATORY (INHALATION) DAILY
Qty: 60 EACH | Refills: 5 | Status: SHIPPED | OUTPATIENT
Start: 2024-04-17

## 2024-06-25 ENCOUNTER — OFFICE VISIT (OUTPATIENT)
Dept: FAMILY MEDICINE CLINIC | Facility: CLINIC | Age: 74
End: 2024-06-25
Payer: MEDICARE

## 2024-06-25 DIAGNOSIS — M15.9 PRIMARY OSTEOARTHRITIS INVOLVING MULTIPLE JOINTS: ICD-10-CM

## 2024-06-25 DIAGNOSIS — N81.89 PELVIC RELAXATION: ICD-10-CM

## 2024-06-25 DIAGNOSIS — E04.9 GOITER: ICD-10-CM

## 2024-06-25 DIAGNOSIS — J44.9 COPD MIXED TYPE: ICD-10-CM

## 2024-06-25 DIAGNOSIS — Z00.00 HEALTHCARE MAINTENANCE: ICD-10-CM

## 2024-06-25 DIAGNOSIS — R73.03 PREDIABETES: ICD-10-CM

## 2024-06-25 DIAGNOSIS — F17.200 SMOKER: ICD-10-CM

## 2024-06-25 DIAGNOSIS — G43.009 MIGRAINE WITHOUT AURA AND WITHOUT STATUS MIGRAINOSUS, NOT INTRACTABLE: ICD-10-CM

## 2024-06-25 DIAGNOSIS — E78.2 MIXED HYPERLIPIDEMIA: Primary | ICD-10-CM

## 2024-06-25 DIAGNOSIS — K21.9 GASTROESOPHAGEAL REFLUX DISEASE WITHOUT ESOPHAGITIS: ICD-10-CM

## 2024-06-25 DIAGNOSIS — Z12.31 ENCOUNTER FOR SCREENING MAMMOGRAM FOR BREAST CANCER: ICD-10-CM

## 2024-06-25 DIAGNOSIS — K58.8 OTHER IRRITABLE BOWEL SYNDROME: ICD-10-CM

## 2024-06-25 DIAGNOSIS — I10 ESSENTIAL HYPERTENSION: ICD-10-CM

## 2024-06-25 DIAGNOSIS — R79.89 ELEVATED TSH: ICD-10-CM

## 2024-06-25 DIAGNOSIS — I25.10 CORONARY ARTERY CALCIFICATION SEEN ON CT SCAN: ICD-10-CM

## 2024-06-25 DIAGNOSIS — N32.89 BLADDER INSTABILITY: ICD-10-CM

## 2024-06-25 DIAGNOSIS — M81.0 AGE-RELATED OSTEOPOROSIS WITHOUT CURRENT PATHOLOGICAL FRACTURE: ICD-10-CM

## 2024-06-25 DIAGNOSIS — Z87.440 H/O RECURRENT URINARY TRACT INFECTION: ICD-10-CM

## 2024-06-25 PROCEDURE — 3074F SYST BP LT 130 MM HG: CPT | Performed by: GENERAL PRACTICE

## 2024-06-25 PROCEDURE — 99214 OFFICE O/P EST MOD 30 MIN: CPT | Performed by: GENERAL PRACTICE

## 2024-06-25 PROCEDURE — G2211 COMPLEX E/M VISIT ADD ON: HCPCS | Performed by: GENERAL PRACTICE

## 2024-06-25 PROCEDURE — 81003 URINALYSIS AUTO W/O SCOPE: CPT | Performed by: GENERAL PRACTICE

## 2024-06-25 PROCEDURE — 3078F DIAST BP <80 MM HG: CPT | Performed by: GENERAL PRACTICE

## 2024-06-25 RX ORDER — FLUTICASONE FUROATE, UMECLIDINIUM BROMIDE AND VILANTEROL TRIFENATATE 100; 62.5; 25 UG/1; UG/1; UG/1
1 POWDER RESPIRATORY (INHALATION) DAILY
Qty: 8 EACH | Refills: 0 | COMMUNITY
Start: 2024-06-25

## 2024-06-25 NOTE — PROGRESS NOTES
Subjective   Albino Estrella is a 73 y.o. female.     Chief Complaint  She returns for a scheduled reassessment of multiple medical problems including gastroesophageal reflux disease, IBS with diarrhea, chronic obstructive pulmonary disease, essential hypertension, hyperlipidemia, chronic low back pain, and chronic pelvic pain    History of Present Illness     Gastroesophageal Reflux Disease  She remains heartburn free on pantoprazole.  She continues to deny any difficulty swallowing, regurgitation, vomiting, or abdominal pain    IBS with Diarrhea  She continues to have intermittent diarrhea.  She has had no problems at night.  With the exception of occasional mild nausea, she continues to deny any associated symptoms.  There is no history of any abdominal pain, hematochezia, or melena, and she continues to deny any fever, chills, or night sweats.  Her last colonoscopy was on 10/26/2018 at which time several hyperplastic polyps were removed.  She remains uninterested in pursuing an updated study at present    COPD  She continues to experience intermittent SOB, cough and wheezing.  There is no history of any chest pain or hemoptysis, and she continues to deny any fever, chills, or night sweats. She remains on trelegy and feels that it helps. She continues to use nebulized albuterol 4 times daily. She continues to smoke 1 pack per day. Updated low dose CT of the chest performed on 3/26/2024 was reported as showing COPD, a benign appearing stable pleural-based RLL nodule, coronary artery calcifications, and a stable substernal thyroid.  A one year follow-up was recommended    Essential Hypertension  She admits to dyspnea on exertion.  She continues to deny any chest pain, palpitations, orthopnea, paroxysmal nocturnal dyspnea or peripheral edema.  She remains on diltiazem and spironolactone.     Hyperlipidemia  Her compliance with treatment has been fair.  She remains fairly active about the house.  She remains on  atorvastatin.     Chronic Low Back Pain  She feels her back pain has remained at baseline. The pain is described as a bilateral ache.  This does not radiate elsewhere and remains unassociated with any other symptoms.  She continues to deny any changes in her strength, sensation, or bowel/bladder control and there is no history of any fever, chills, or night sweats.  The pain is worse with prolonged standing and improves with rest or change of position.    Pelvic Pressure  She was fit for a ring by VARINDER Oropeza) at [facility] on 11/1/23 with a prompt improvement in both her sense of pressure and in her urinary frequency, urgency, dysuria, and incontinence. She continues to deny any hematuria, vaginal bleeding or discharge. She has a history of recurrent urinary tract infections and continues to experience mild intermittent dysuria.  She was tried on tolterodine but stopped it due to a lack of effect and increased fatigue. US of the pelvis performed on 10/16/23 was reported as showing a 2.3 cm area of decreased echogenicity within the cervix.  Transvaginal ultrasound performed on 1/29/2024 was reported as showing a hyperechoic focus adjacent to the cervix.  While records have yet to be received, she states that she underwent gynecology reassessment with an pelvic exam since last here.    The following portions of the patient's history were reviewed and updated as appropriate: allergies, current medications, past medical history, past social history, and problem list.    Review of Systems   Constitutional:  Positive for fatigue. Negative for chills and fever.   HENT:  Positive for congestion, rhinorrhea and sneezing. Negative for ear pain, sore throat and voice change.    Eyes:  Negative for visual disturbance.   Respiratory:  Positive for cough, shortness of breath and wheezing.    Cardiovascular:  Negative for chest pain, palpitations and leg swelling.   Gastrointestinal:  Positive for diarrhea and  nausea. Negative for abdominal pain, blood in stool, constipation and vomiting.   Genitourinary:  Positive for dysuria, frequency, pelvic pressure and urgency. Negative for hematuria, vaginal bleeding and vaginal pain.   Musculoskeletal:  Positive for back pain. Negative for arthralgias, joint swelling and myalgias.   Skin:  Negative for rash.   Neurological:  Positive for dizziness (occasional). Negative for weakness and numbness.   Psychiatric/Behavioral:  Negative for sleep disturbance and depressed mood. The patient is not nervous/anxious.      Objective   Physical Exam  Constitutional:       General: She is not in acute distress.     Appearance: Normal appearance. She is well-developed. She is not diaphoretic.      Comments: Accompanied by her . Bright and in fair spirits. No apparent distress. No pallor, jaundice, diaphoresis, or cyanosis.   HENT:      Head: Atraumatic.      Right Ear: Tympanic membrane, ear canal and external ear normal. There is impacted cerumen.      Left Ear: Tympanic membrane, ear canal and external ear normal. There is impacted cerumen.      Mouth/Throat:      Lips: No lesions.      Mouth: Mucous membranes are moist. No oral lesions.      Pharynx: No oropharyngeal exudate or posterior oropharyngeal erythema.   Eyes:      General: Lids are normal.      Extraocular Movements: Extraocular movements intact.      Conjunctiva/sclera: Conjunctivae normal.      Pupils: Pupils are equal.   Neck:      Thyroid: Thyromegaly (right lobe more prominent then the left) present. No thyroid mass.      Vascular: No carotid bruit or JVD.      Trachea: Trachea normal. No tracheal deviation.   Cardiovascular:      Rate and Rhythm: Normal rate and regular rhythm.      Heart sounds: Normal heart sounds, S1 normal and S2 normal. No murmur heard.     No gallop.   Pulmonary:      Effort: Pulmonary effort is normal.      Breath sounds: Examination of the right-lower field reveals decreased breath sounds.  Examination of the left-lower field reveals decreased breath sounds. Decreased breath sounds and wheezing (diffuse - mild) present. No rales.      Comments: Pulmonary hyperinflation  Abdominal:      General: Bowel sounds are normal. There is no distension.   Musculoskeletal:      Right lower leg: No edema.      Left lower leg: No edema.   Lymphadenopathy:      Head:      Right side of head: No submental, submandibular, tonsillar, preauricular, posterior auricular or occipital adenopathy.      Left side of head: No submental, submandibular, tonsillar, preauricular, posterior auricular or occipital adenopathy.      Cervical: No cervical adenopathy.      Upper Body:      Right upper body: No supraclavicular adenopathy.      Left upper body: No supraclavicular adenopathy.   Skin:     General: Skin is warm.      Coloration: Skin is not cyanotic, jaundiced or pale.      Findings: No rash.      Nails: There is no clubbing.   Neurological:      Mental Status: She is alert and oriented to person, place, and time.      Cranial Nerves: No cranial nerve deficit, dysarthria or facial asymmetry.      Sensory: No sensory deficit.      Motor: No tremor.      Coordination: Coordination normal.      Gait: Gait normal.   Psychiatric:         Attention and Perception: Attention normal.         Mood and Affect: Mood normal.         Speech: Speech normal.         Behavior: Behavior normal.         Thought Content: Thought content normal.       Assessment & Plan   Problems Addressed this Visit          Cardiac and Vasculature    Coronary artery calcification seen on CT scan  Reminded regarding risk factor modification with an emphasis on tobacco cessation.  Continue low-dose ASA.    Relevant Medications    dilTIAZem (TIAZAC) 360 MG 24 hr capsule    Essential hypertension   Hypertension: at goal. Evidence of target organ damage:  coronary artery calcifications noted on previous imaging .  Encouraged to continue to work on diet and exercise  plan.   Continue current medication    Relevant Medications    dilTIAZem (TIAZAC) 360 MG 24 hr capsule    Mixed hyperlipidemia   As above.   Continue current medication.  Updated labs will be drawn at her return.       Endocrine and Metabolic    Goiter  Clinically euthyroid.  Will continue to monitor    Prediabetes  As above.  Will continue to monitor       Gastrointestinal Abdominal     Gastroesophageal reflux disease without esophagitis  Reminded regarding lifestyle modification  Continue current medication  Encouraged to report if any worse or if any new symptoms or concerns.    IBS (irritable bowel syndrome)  As above.       Genitourinary and Reproductive     Bladder instability    Encounter for screening mammogram for breast cancer    Relevant Orders    Mammo Screening Digital Tomosynthesis Bilateral With CAD    H/O recurrent urinary tract infection    Relevant Orders    Urinalysis With Culture If Indicated - Urine, Clean Catch (Completed)    [place] Urine Culture Tube - Urine, Clean Catch (Completed)    Pelvic relaxation  Follow up with gynecology  Will try to obtain records       Health Encounters    Healthcare maintenance  Recommended RSV, Shingrix, and an updated Tdap  Reminded to get a flu shot when available    Relevant Orders    Mammo Screening Digital Tomosynthesis Bilateral With CAD       Musculoskeletal and Injuries    Age-related osteoporosis without current pathological fracture  Encouraged to continue to pursue weight bearing activities while exercising joint protection.  Advised to resume denosumab injections    Relevant Medications    denosumab (PROLIA) 60 MG/ML solution prefilled syringe syringe    Primary osteoarthritis  Reminded regarding symptomatic treatment.   Encouraged to report if any worse or if any new symptoms or concerns.       Neuro    Migraine without aura and without status migrainosus, not intractable    Relevant Medications    dilTIAZem (TIAZAC) 360 MG 24 hr capsule        Pulmonary and Pneumonias    COPD mixed type   COPD is worsening.  Reminded of the importance of smoking cessation  Encouraged to remain as active as symptoms allow for  Continue current medication    Relevant Medications    Fluticasone-Umeclidin-Vilant (Trelegy Ellipta) 100-62.5-25 MCG/ACT inhaler       Symptoms and Signs    Elevated TSH  Will continue to monitor       Tobacco    Smoker  Lengthy discussion regarding the potential sequela of continued tobacco use and the options with respect to cessation.  Patient will consider     Diagnoses         Codes Comments    Mixed hyperlipidemia    -  Primary ICD-10-CM: E78.2  ICD-9-CM: 272.2     Essential hypertension     ICD-10-CM: I10  ICD-9-CM: 401.9     Coronary artery calcification seen on CT scan     ICD-10-CM: I25.10  ICD-9-CM: 414.00     Prediabetes     ICD-10-CM: R73.03  ICD-9-CM: 790.29     Goiter     ICD-10-CM: E04.9  ICD-9-CM: 240.9     Other irritable bowel syndrome     ICD-10-CM: K58.8  ICD-9-CM: 564.1     Gastroesophageal reflux disease without esophagitis     ICD-10-CM: K21.9  ICD-9-CM: 530.81     Pelvic relaxation     ICD-10-CM: N81.89  ICD-9-CM: 618.89     H/O recurrent urinary tract infection     ICD-10-CM: Z87.440  ICD-9-CM: V13.02     Healthcare maintenance     ICD-10-CM: Z00.00  ICD-9-CM: V70.0     Primary osteoarthritis involving multiple joints     ICD-10-CM: M15.9  ICD-9-CM: 715.98     Age-related osteoporosis without current pathological fracture     ICD-10-CM: M81.0  ICD-9-CM: 733.01     Migraine without aura and without status migrainosus, not intractable     ICD-10-CM: G43.009  ICD-9-CM: 346.10     COPD mixed type     ICD-10-CM: J44.9  ICD-9-CM: 496     Elevated TSH     ICD-10-CM: R79.89  ICD-9-CM: 794.5     Smoker     ICD-10-CM: F17.200  ICD-9-CM: 305.1     Bladder instability     ICD-10-CM: N32.89  ICD-9-CM: 596.89     Encounter for screening mammogram for breast cancer     ICD-10-CM: Z12.31  ICD-9-CM: V76.12

## 2024-06-26 VITALS
RESPIRATION RATE: 15 BRPM | SYSTOLIC BLOOD PRESSURE: 128 MMHG | DIASTOLIC BLOOD PRESSURE: 70 MMHG | HEART RATE: 77 BPM | HEIGHT: 61 IN | WEIGHT: 103 LBS | BODY MASS INDEX: 19.45 KG/M2 | OXYGEN SATURATION: 100 % | TEMPERATURE: 98.6 F

## 2024-06-26 PROBLEM — R93.89 ABNORMAL PELVIC ULTRASOUND: Status: RESOLVED | Noted: 2023-12-07 | Resolved: 2024-06-26

## 2024-06-26 LAB
BILIRUB UR QL STRIP: NEGATIVE
CLARITY UR: ABNORMAL
COLOR UR: YELLOW
GLUCOSE UR STRIP-MCNC: NEGATIVE MG/DL
HGB UR QL STRIP.AUTO: NEGATIVE
HOLD SPECIMEN: NORMAL
KETONES UR QL STRIP: NEGATIVE
LEUKOCYTE ESTERASE UR QL STRIP.AUTO: NEGATIVE
NITRITE UR QL STRIP: NEGATIVE
PH UR STRIP.AUTO: 6.5 [PH] (ref 5–8)
PROT UR QL STRIP: NEGATIVE
SP GR UR STRIP: 1.02 (ref 1–1.03)
UROBILINOGEN UR QL STRIP: ABNORMAL

## 2024-06-26 RX ORDER — DILTIAZEM HYDROCHLORIDE 360 MG/1
360 CAPSULE, EXTENDED RELEASE ORAL DAILY
Qty: 90 CAPSULE | Refills: 3 | Status: SHIPPED | OUTPATIENT
Start: 2024-06-26

## 2024-06-26 RX ORDER — FLUCONAZOLE 150 MG/1
150 TABLET ORAL DAILY PRN
Qty: 5 TABLET | Refills: 0 | Status: SHIPPED | OUTPATIENT
Start: 2024-06-26

## 2024-09-19 ENCOUNTER — HOSPITAL ENCOUNTER (OUTPATIENT)
Dept: MAMMOGRAPHY | Facility: HOSPITAL | Age: 74
Discharge: HOME | End: 2024-09-19
Admitting: GENERAL PRACTICE
Payer: MEDICARE

## 2024-09-19 DIAGNOSIS — Z00.00 HEALTHCARE MAINTENANCE: ICD-10-CM

## 2024-09-19 DIAGNOSIS — Z12.31 ENCOUNTER FOR SCREENING MAMMOGRAM FOR BREAST CANCER: ICD-10-CM

## 2024-09-19 PROCEDURE — 77067 SCR MAMMO BI INCL CAD: CPT

## 2024-09-19 PROCEDURE — 77063 BREAST TOMOSYNTHESIS BI: CPT

## 2024-10-08 DIAGNOSIS — K21.9 GASTROESOPHAGEAL REFLUX DISEASE WITHOUT ESOPHAGITIS: ICD-10-CM

## 2024-10-08 RX ORDER — PANTOPRAZOLE SODIUM 40 MG/1
40 TABLET, DELAYED RELEASE ORAL 2 TIMES DAILY
Qty: 180 TABLET | Refills: 3 | Status: SHIPPED | OUTPATIENT
Start: 2024-10-08

## 2024-10-15 ENCOUNTER — OFFICE VISIT (OUTPATIENT)
Dept: FAMILY MEDICINE CLINIC | Facility: CLINIC | Age: 74
End: 2024-10-15
Payer: MEDICARE

## 2024-10-15 DIAGNOSIS — Z23 ENCOUNTER FOR IMMUNIZATION: ICD-10-CM

## 2024-10-15 DIAGNOSIS — N32.89 BLADDER INSTABILITY: ICD-10-CM

## 2024-10-15 DIAGNOSIS — Z87.440 H/O RECURRENT URINARY TRACT INFECTION: ICD-10-CM

## 2024-10-15 DIAGNOSIS — N81.89 PELVIC RELAXATION: ICD-10-CM

## 2024-10-15 DIAGNOSIS — E78.2 MIXED HYPERLIPIDEMIA: Primary | ICD-10-CM

## 2024-10-15 DIAGNOSIS — M81.0 AGE-RELATED OSTEOPOROSIS WITHOUT CURRENT PATHOLOGICAL FRACTURE: ICD-10-CM

## 2024-10-15 DIAGNOSIS — E04.9 GOITER: ICD-10-CM

## 2024-10-15 DIAGNOSIS — R79.89 ELEVATED TSH: ICD-10-CM

## 2024-10-15 DIAGNOSIS — J44.9 CHRONIC OBSTRUCTIVE PULMONARY DISEASE, UNSPECIFIED COPD TYPE: ICD-10-CM

## 2024-10-15 DIAGNOSIS — K21.9 GASTROESOPHAGEAL REFLUX DISEASE WITHOUT ESOPHAGITIS: ICD-10-CM

## 2024-10-15 DIAGNOSIS — K58.8 OTHER IRRITABLE BOWEL SYNDROME: ICD-10-CM

## 2024-10-15 DIAGNOSIS — I10 ESSENTIAL HYPERTENSION: ICD-10-CM

## 2024-10-15 DIAGNOSIS — M15.0 PRIMARY OSTEOARTHRITIS INVOLVING MULTIPLE JOINTS: ICD-10-CM

## 2024-10-15 DIAGNOSIS — F17.200 SMOKER: ICD-10-CM

## 2024-10-15 DIAGNOSIS — Z00.00 HEALTHCARE MAINTENANCE: ICD-10-CM

## 2024-10-15 DIAGNOSIS — J44.9 COPD MIXED TYPE: ICD-10-CM

## 2024-10-15 DIAGNOSIS — R73.03 PREDIABETES: ICD-10-CM

## 2024-10-15 DIAGNOSIS — G43.009 MIGRAINE WITHOUT AURA AND WITHOUT STATUS MIGRAINOSUS, NOT INTRACTABLE: ICD-10-CM

## 2024-10-15 DIAGNOSIS — I25.10 CORONARY ARTERY CALCIFICATION SEEN ON CT SCAN: ICD-10-CM

## 2024-10-15 PROCEDURE — 3075F SYST BP GE 130 - 139MM HG: CPT | Performed by: GENERAL PRACTICE

## 2024-10-15 PROCEDURE — 90662 IIV NO PRSV INCREASED AG IM: CPT | Performed by: GENERAL PRACTICE

## 2024-10-15 PROCEDURE — 3078F DIAST BP <80 MM HG: CPT | Performed by: GENERAL PRACTICE

## 2024-10-15 PROCEDURE — G0008 ADMIN INFLUENZA VIRUS VAC: HCPCS | Performed by: GENERAL PRACTICE

## 2024-10-15 PROCEDURE — 99214 OFFICE O/P EST MOD 30 MIN: CPT | Performed by: GENERAL PRACTICE

## 2024-10-15 NOTE — PROGRESS NOTES
Aicha Estrella is a 73 y.o. female.     Chief Complaint  She returns for a scheduled reassessment of multiple medical problems including gastroesophageal reflux disease, irritable bowel syndrome with diarrhea, chronic obstructive pulmonary disease, essential hypertension, hyperlipidemia, chronic low back pain, and chronic pelvic pressure    History of Present Illness     Gastroesophageal Reflux Disease  She remains heartburn free on pantoprazole.  She continues to deny any difficulty swallowing, regurgitation, vomiting, or abdominal pain    IBS with Diarrhea  She continues to have intermittent diarrhea.  She has had no problems at night.  With the exception of occasional mild nausea, she continues to deny any associated symptoms.  There is no history of any abdominal pain, hematochezia, or melena, and she continues to deny any fever, chills, or night sweats.  Her last colonoscopy was on 10/26/2018 at which time several hyperplastic polyps were removed.  She remains uninterested in pursuing an updated study    COPD  She continues to experience intermittent SOB, cough and wheezing.  There is no history of any chest pain or hemoptysis, and she continues to deny any fever, chills, or night sweats. She remains on trelegy and feels that it helps. She continues to use nebulized albuterol 4 times daily. She continues to smoke 1 pack per day. Updated low dose CT of the chest performed on 3/26/2024 was reported as showing COPD, a benign appearing stable pleural-based RLL nodule, coronary artery calcifications, and a stable substernal thyroid.  A one year follow-up was recommended    Essential Hypertension  She admits to dyspnea on exertion.  She continues to deny any chest pain, palpitations, orthopnea, paroxysmal nocturnal dyspnea or peripheral edema.  She remains on diltiazem and spironolactone.     Hyperlipidemia  Her compliance with treatment has been fair.  She remains fairly active about the house.  She  remains on atorvastatin.  She has had no recent labs but is fasting this morning    Chronic Low Back Pain  She feels her back pain remains at baseline. The pain is described as a bilateral ache.  This does not radiate elsewhere, and remains unassociated with any other symptoms.  She continues to deny any changes in her strength, sensation, or bowel/bladder control and there is no history of any fever, chills, or night sweats.  The pain is worse with prolonged standing and improves with rest or change of position.    Pelvic Pressure  She was fit for a ring by VARINDER Oropeza) at [facility] on 11/1/23 with a prompt improvement in both her sense of pressure and in her urinary frequency, urgency, dysuria, and incontinence. She continues to deny any hematuria, vaginal bleeding or discharge. She has a history of recurrent urinary tract infections and continues to experience mild intermittent dysuria.  She was tried on tolterodine but stopped it due to a lack of effect and increased fatigue. US of the pelvis performed on 10/16/23 was reported as showing a 2.3 cm area of decreased echogenicity within the cervix.  Transvaginal ultrasound performed on 1/29/2024 was reported as showing a hyperechoic focus adjacent to the cervix.  She underwent a gynecology reassessment on 6/25/2024    The following portions of the patient's history were reviewed and updated as appropriate: allergies, current medications, past medical history, past social history, and problem list.    Review of Systems   Constitutional:  Positive for fatigue. Negative for chills and fever.   HENT:  Positive for congestion, rhinorrhea and sneezing. Negative for ear pain, sore throat and voice change.    Eyes:  Negative for visual disturbance.   Respiratory:  Positive for cough, shortness of breath and wheezing.    Cardiovascular:  Negative for chest pain, palpitations and leg swelling.   Gastrointestinal:  Positive for diarrhea and nausea.  Negative for abdominal pain, blood in stool, constipation and vomiting.   Genitourinary:  Positive for dysuria, frequency, pelvic pressure and urgency. Negative for hematuria, vaginal bleeding and vaginal pain.   Musculoskeletal:  Positive for back pain. Negative for arthralgias, joint swelling and myalgias.   Skin:  Negative for rash.   Neurological:  Positive for dizziness (occasional). Negative for weakness and numbness.   Psychiatric/Behavioral:  Negative for sleep disturbance and depressed mood. The patient is not nervous/anxious.      Objective   Physical Exam  Constitutional:       General: She is not in acute distress.     Appearance: Normal appearance. She is well-developed. She is not diaphoretic.      Comments: Accompanied by her . Bright and in fair spirits. No apparent distress. No pallor, jaundice, diaphoresis, or cyanosis.   HENT:      Head: Atraumatic.      Right Ear: Tympanic membrane, ear canal and external ear normal. There is impacted cerumen.      Left Ear: Tympanic membrane, ear canal and external ear normal. There is impacted cerumen.      Mouth/Throat:      Lips: No lesions.      Mouth: Mucous membranes are moist. No oral lesions.      Pharynx: No oropharyngeal exudate or posterior oropharyngeal erythema.   Eyes:      General: Lids are normal.      Extraocular Movements: Extraocular movements intact.      Conjunctiva/sclera: Conjunctivae normal.      Pupils: Pupils are equal.   Neck:      Thyroid: Thyromegaly (right lobe more prominent then the left) present. No thyroid mass.      Vascular: No carotid bruit or JVD.      Trachea: Trachea normal. No tracheal deviation.   Cardiovascular:      Rate and Rhythm: Normal rate and regular rhythm.      Heart sounds: Normal heart sounds, S1 normal and S2 normal. No murmur heard.     No gallop.   Pulmonary:      Effort: Pulmonary effort is normal.      Breath sounds: Examination of the right-lower field reveals decreased breath sounds. Examination  of the left-lower field reveals decreased breath sounds. Decreased breath sounds and wheezing (diffuse - mild) present. No rales.      Comments: Pulmonary hyperinflation  Abdominal:      General: Bowel sounds are normal. There is no distension.   Musculoskeletal:      Right lower leg: No edema.      Left lower leg: No edema.   Lymphadenopathy:      Head:      Right side of head: No submental, submandibular, tonsillar, preauricular, posterior auricular or occipital adenopathy.      Left side of head: No submental, submandibular, tonsillar, preauricular, posterior auricular or occipital adenopathy.      Cervical: No cervical adenopathy.      Upper Body:      Right upper body: No supraclavicular adenopathy.      Left upper body: No supraclavicular adenopathy.   Skin:     General: Skin is warm.      Coloration: Skin is not cyanotic, jaundiced or pale.      Findings: No rash.      Nails: There is no clubbing.   Neurological:      Mental Status: She is alert and oriented to person, place, and time.      Cranial Nerves: No cranial nerve deficit, dysarthria or facial asymmetry.      Sensory: No sensory deficit.      Motor: No tremor.      Coordination: Coordination normal.      Gait: Gait normal.   Psychiatric:         Attention and Perception: Attention normal.         Mood and Affect: Mood normal.         Speech: Speech normal.         Behavior: Behavior normal.         Thought Content: Thought content normal.       Assessment & Plan   Problems Addressed this Visit          Cardiac and Vasculature    Coronary artery calcification seen on CT scan  Reminded regarding risk factor modification with an emphasis on tobacco cessation.  Continue low-dose ASA.    Relevant Medications    dilTIAZem (TIAZAC) 360 MG 24 hr capsule    Other Relevant Orders    CBC & Differential (Completed)    Essential hypertension   Hypertension: at goal. Evidence of target organ damage:  coronary artery calcifications on previous imaging .  Encouraged  to continue to work on diet and exercise plan.   Continue current medication    Relevant Medications    spironolactone (ALDACTONE) 25 MG tablet    dilTIAZem (TIAZAC) 360 MG 24 hr capsule    Other Relevant Orders    Comprehensive Metabolic Panel (Completed)    TSH (Completed)    CBC & Differential (Completed)    Mixed hyperlipidemia  As above.   Continue current medication.  Updated labs drawn.    Relevant Medications    atorvastatin (LIPITOR) 40 MG tablet    Other Relevant Orders    Comprehensive Metabolic Panel (Completed)    TSH (Completed)    Lipid Panel (Completed)       Endocrine and Metabolic    Goiter    Prediabetes  As above.  Will continue to monitor    Relevant Orders    Comprehensive Metabolic Panel (Completed)    TSH (Completed)    Hemoglobin A1c (Completed)       Gastrointestinal Abdominal     Gastroesophageal reflux disease without esophagitis  Reminded regarding lifestyle modification  Continue current medication    Relevant Orders    CBC & Differential (Completed)    IBS (irritable bowel syndrome)  As above.    Relevant Orders    CBC & Differential (Completed)       Genitourinary and Reproductive     Bladder instability    H/O recurrent urinary tract infection    Pelvic relaxation  Follow up with gynecology       Health Encounters    Healthcare maintenance  Flu shot administered.  Strongly recommended RSV and COVID-19 shots this fall  Reminded that she is also due for Shingrix and now an updated Tdap  Will arrange an updated LDCT of the chest at her return    Relevant Medications    vitamin B-12 (CYANOCOBALAMIN) 1000 MCG tablet    Other Relevant Orders    Fluzone High-Dose 65+yrs (Completed)       Infectious Diseases    Encounter for immunization    Relevant Orders    Fluzone High-Dose 65+yrs (Completed)       Musculoskeletal and Injuries    Age-related osteoporosis without current pathological fracture  Encouraged to continue to pursue weight bearing activities while exercising joint  protection.  Continue current medication    Relevant Medications    denosumab (PROLIA) 60 MG/ML solution prefilled syringe syringe    Other Relevant Orders    TSH (Completed)    Vitamin D,25-Hydroxy (Completed)    Primary osteoarthritis       Neuro    Migraine without aura and without status migrainosus, not intractable    Relevant Medications    SUMAtriptan (IMITREX) 100 MG tablet    dilTIAZem (TIAZAC) 360 MG 24 hr capsule       Pulmonary and Pneumonias    COPD mixed type   COPD is stable.  Reminded of the importance of smoking cessation  Encouraged to remain as active as symptoms allow for  Continue current medication    Relevant Medications    montelukast (SINGULAIR) 10 MG tablet    ipratropium-albuterol (DUO-NEB) 0.5-2.5 mg/3 ml nebulizer    Fluticasone-Umeclidin-Vilant (Trelegy Ellipta) 100-62.5-25 MCG/ACT inhaler    Other Relevant Orders    CBC & Differential (Completed)       Symptoms and Signs    Elevated TSH  Will continue to monitor    Relevant Orders    TSH (Completed)       Tobacco    Smoker     Diagnoses         Codes Comments    Mixed hyperlipidemia    -  Primary ICD-10-CM: E78.2  ICD-9-CM: 272.2     Essential hypertension     ICD-10-CM: I10  ICD-9-CM: 401.9     Coronary artery calcification seen on CT scan     ICD-10-CM: I25.10  ICD-9-CM: 414.00     Prediabetes     ICD-10-CM: R73.03  ICD-9-CM: 790.29     Goiter     ICD-10-CM: E04.9  ICD-9-CM: 240.9     Other irritable bowel syndrome     ICD-10-CM: K58.8  ICD-9-CM: 564.1     Gastroesophageal reflux disease without esophagitis     ICD-10-CM: K21.9  ICD-9-CM: 530.81     Pelvic relaxation     ICD-10-CM: N81.89  ICD-9-CM: 618.89     H/O recurrent urinary tract infection     ICD-10-CM: Z87.440  ICD-9-CM: V13.02     Bladder instability     ICD-10-CM: N32.89  ICD-9-CM: 596.89     Healthcare maintenance     ICD-10-CM: Z00.00  ICD-9-CM: V70.0     Primary osteoarthritis involving multiple joints     ICD-10-CM: M15.0  ICD-9-CM: 715.98     Age-related osteoporosis  without current pathological fracture     ICD-10-CM: M81.0  ICD-9-CM: 733.01     Migraine without aura and without status migrainosus, not intractable     ICD-10-CM: G43.009  ICD-9-CM: 346.10     COPD mixed type     ICD-10-CM: J44.9  ICD-9-CM: 496     Elevated TSH     ICD-10-CM: R79.89  ICD-9-CM: 794.5     Smoker     ICD-10-CM: F17.200  ICD-9-CM: 305.1     Encounter for immunization     ICD-10-CM: Z23  ICD-9-CM: V03.89     Chronic obstructive pulmonary disease, unspecified COPD type     ICD-10-CM: J44.9  ICD-9-CM: 496

## 2024-10-16 VITALS
WEIGHT: 101 LBS | RESPIRATION RATE: 15 BRPM | HEIGHT: 61 IN | HEART RATE: 69 BPM | DIASTOLIC BLOOD PRESSURE: 65 MMHG | BODY MASS INDEX: 19.07 KG/M2 | OXYGEN SATURATION: 93 % | TEMPERATURE: 98.6 F | SYSTOLIC BLOOD PRESSURE: 130 MMHG

## 2024-10-16 DIAGNOSIS — E06.3 HYPOTHYROIDISM DUE TO HASHIMOTO THYROIDITIS: Primary | ICD-10-CM

## 2024-10-16 LAB
25(OH)D3+25(OH)D2 SERPL-MCNC: 31.8 NG/ML (ref 30–100)
ALBUMIN SERPL-MCNC: 4 G/DL (ref 3.5–5.2)
ALBUMIN/GLOB SERPL: 1.1 G/DL
ALP SERPL-CCNC: 73 U/L (ref 39–117)
ALT SERPL-CCNC: 13 U/L (ref 1–33)
AST SERPL-CCNC: 15 U/L (ref 1–32)
BASOPHILS # BLD AUTO: 0.08 10*3/MM3 (ref 0–0.2)
BASOPHILS NFR BLD AUTO: 0.7 % (ref 0–1.5)
BILIRUB SERPL-MCNC: 0.5 MG/DL (ref 0–1.2)
BUN SERPL-MCNC: 6 MG/DL (ref 8–23)
BUN/CREAT SERPL: 8.5 (ref 7–25)
CALCIUM SERPL-MCNC: 8.9 MG/DL (ref 8.6–10.5)
CHLORIDE SERPL-SCNC: 104 MMOL/L (ref 98–107)
CHOLEST SERPL-MCNC: 175 MG/DL (ref 0–200)
CO2 SERPL-SCNC: 23.7 MMOL/L (ref 22–29)
CREAT SERPL-MCNC: 0.71 MG/DL (ref 0.57–1)
EGFRCR SERPLBLD CKD-EPI 2021: 89.9 ML/MIN/1.73
EOSINOPHIL # BLD AUTO: 0.16 10*3/MM3 (ref 0–0.4)
EOSINOPHIL NFR BLD AUTO: 1.3 % (ref 0.3–6.2)
ERYTHROCYTE [DISTWIDTH] IN BLOOD BY AUTOMATED COUNT: 13 % (ref 12.3–15.4)
GLOBULIN SER CALC-MCNC: 3.5 GM/DL
GLUCOSE SERPL-MCNC: 99 MG/DL (ref 65–99)
HBA1C MFR BLD: 5.1 % (ref 4.8–5.6)
HCT VFR BLD AUTO: 38.1 % (ref 34–46.6)
HDLC SERPL-MCNC: 61 MG/DL (ref 40–60)
HGB BLD-MCNC: 13.4 G/DL (ref 12–15.9)
IMM GRANULOCYTES # BLD AUTO: 0.03 10*3/MM3 (ref 0–0.05)
IMM GRANULOCYTES NFR BLD AUTO: 0.3 % (ref 0–0.5)
LDLC SERPL CALC-MCNC: 95 MG/DL (ref 0–100)
LYMPHOCYTES # BLD AUTO: 4.86 10*3/MM3 (ref 0.7–3.1)
LYMPHOCYTES NFR BLD AUTO: 40.7 % (ref 19.6–45.3)
MCH RBC QN AUTO: 34.5 PG (ref 26.6–33)
MCHC RBC AUTO-ENTMCNC: 35.2 G/DL (ref 31.5–35.7)
MCV RBC AUTO: 98.2 FL (ref 79–97)
MONOCYTES # BLD AUTO: 0.61 10*3/MM3 (ref 0.1–0.9)
MONOCYTES NFR BLD AUTO: 5.1 % (ref 5–12)
NEUTROPHILS # BLD AUTO: 6.21 10*3/MM3 (ref 1.7–7)
NEUTROPHILS NFR BLD AUTO: 51.9 % (ref 42.7–76)
NRBC BLD AUTO-RTO: 0 /100 WBC (ref 0–0.2)
PLATELET # BLD AUTO: 278 10*3/MM3 (ref 140–450)
POTASSIUM SERPL-SCNC: 4 MMOL/L (ref 3.5–5.2)
PROT SERPL-MCNC: 7.5 G/DL (ref 6–8.5)
RBC # BLD AUTO: 3.88 10*6/MM3 (ref 3.77–5.28)
SODIUM SERPL-SCNC: 139 MMOL/L (ref 136–145)
TRIGL SERPL-MCNC: 105 MG/DL (ref 0–150)
TSH SERPL DL<=0.005 MIU/L-ACNC: 11.4 UIU/ML (ref 0.27–4.2)
VLDLC SERPL CALC-MCNC: 19 MG/DL (ref 5–40)
WBC # BLD AUTO: 11.95 10*3/MM3 (ref 3.4–10.8)

## 2024-10-16 RX ORDER — IPRATROPIUM BROMIDE AND ALBUTEROL SULFATE 2.5; .5 MG/3ML; MG/3ML
3 SOLUTION RESPIRATORY (INHALATION) 4 TIMES DAILY PRN
Qty: 540 ML | Refills: 5 | Status: SHIPPED | OUTPATIENT
Start: 2024-10-16

## 2024-10-16 RX ORDER — SPIRONOLACTONE 25 MG/1
25 TABLET ORAL EVERY MORNING
Qty: 90 TABLET | Refills: 3 | Status: SHIPPED | OUTPATIENT
Start: 2024-10-16

## 2024-10-16 RX ORDER — MONTELUKAST SODIUM 10 MG/1
10 TABLET ORAL NIGHTLY
Qty: 90 TABLET | Refills: 3 | Status: SHIPPED | OUTPATIENT
Start: 2024-10-16

## 2024-10-16 RX ORDER — LANOLIN ALCOHOL/MO/W.PET/CERES
1000 CREAM (GRAM) TOPICAL DAILY
Qty: 90 TABLET | Refills: 3 | Status: SHIPPED | OUTPATIENT
Start: 2024-10-16

## 2024-10-16 RX ORDER — FLUTICASONE FUROATE, UMECLIDINIUM BROMIDE AND VILANTEROL TRIFENATATE 100; 62.5; 25 UG/1; UG/1; UG/1
1 POWDER RESPIRATORY (INHALATION) DAILY
Qty: 3 EACH | Refills: 3 | Status: SHIPPED | OUTPATIENT
Start: 2024-10-16

## 2024-10-16 RX ORDER — ATORVASTATIN CALCIUM 40 MG/1
40 TABLET, FILM COATED ORAL
Qty: 90 TABLET | Refills: 3 | Status: SHIPPED | OUTPATIENT
Start: 2024-10-16

## 2024-10-16 RX ORDER — SUMATRIPTAN 100 MG/1
TABLET, FILM COATED ORAL
Qty: 27 TABLET | Refills: 3 | Status: SHIPPED | OUTPATIENT
Start: 2024-10-16

## 2024-10-16 RX ORDER — DILTIAZEM HYDROCHLORIDE 360 MG/1
360 CAPSULE, EXTENDED RELEASE ORAL DAILY
Qty: 90 CAPSULE | Refills: 3 | Status: SHIPPED | OUTPATIENT
Start: 2024-10-16

## 2024-10-16 RX ORDER — LEVOTHYROXINE SODIUM 50 UG/1
50 TABLET ORAL DAILY
Qty: 90 TABLET | Refills: 1 | Status: SHIPPED | OUTPATIENT
Start: 2024-10-16

## 2024-11-21 DIAGNOSIS — I10 ESSENTIAL HYPERTENSION: ICD-10-CM

## 2024-11-21 RX ORDER — DILTIAZEM HYDROCHLORIDE 360 MG/1
360 CAPSULE, EXTENDED RELEASE ORAL DAILY
Qty: 90 CAPSULE | Refills: 3 | Status: SHIPPED | OUTPATIENT
Start: 2024-11-21

## 2024-11-21 NOTE — TELEPHONE ENCOUNTER
Caller: Albino Estrella    Relationship: Self    Best call back number: [phone]     Requested Prescriptions:   Requested Prescriptions     Pending Prescriptions Disp Refills    dilTIAZem (TIAZAC) 360 MG 24 hr capsule 90 capsule 3     Sig: Take 1 capsule by mouth Daily.        Pharmacy where request should be sent: [id number] [address] AT [facility] [address] - [phone] PH - [phone] FX     Last office visit with prescribing clinician: 10/15/2024   Last telemedicine visit with prescribing clinician: Visit date not found   Next office visit with prescribing clinician: 1/2/2025     Additional details provided by patient: HAS BEEN OUT OF FOR 3 DAYS,  NEEDS 90 DAY SUPPLY    Does the patient have less than a 3 day supply:  [x] Yes  [] No    Would you like a call back once the refill request has been completed: [] Yes [] No    If the office needs to give you a call back, can they leave a voicemail: [] Yes [] No    Ema Barrera Rep   11/21/24 10:49 EST

## 2025-01-02 ENCOUNTER — OFFICE VISIT (OUTPATIENT)
Dept: FAMILY MEDICINE CLINIC | Facility: CLINIC | Age: 75
End: 2025-01-02
Payer: MEDICARE

## 2025-01-02 VITALS
BODY MASS INDEX: 19.26 KG/M2 | HEIGHT: 61 IN | SYSTOLIC BLOOD PRESSURE: 116 MMHG | HEART RATE: 76 BPM | RESPIRATION RATE: 16 BRPM | DIASTOLIC BLOOD PRESSURE: 60 MMHG | OXYGEN SATURATION: 96 % | WEIGHT: 102 LBS | TEMPERATURE: 98.6 F

## 2025-01-02 DIAGNOSIS — Z87.440 H/O RECURRENT URINARY TRACT INFECTION: ICD-10-CM

## 2025-01-02 DIAGNOSIS — M81.0 AGE-RELATED OSTEOPOROSIS WITHOUT CURRENT PATHOLOGICAL FRACTURE: ICD-10-CM

## 2025-01-02 DIAGNOSIS — F17.200 SMOKER: ICD-10-CM

## 2025-01-02 DIAGNOSIS — Z00.00 HEALTHCARE MAINTENANCE: ICD-10-CM

## 2025-01-02 DIAGNOSIS — I10 ESSENTIAL HYPERTENSION: ICD-10-CM

## 2025-01-02 DIAGNOSIS — E78.2 MIXED HYPERLIPIDEMIA: Primary | ICD-10-CM

## 2025-01-02 DIAGNOSIS — K58.8 OTHER IRRITABLE BOWEL SYNDROME: ICD-10-CM

## 2025-01-02 DIAGNOSIS — M54.9 MECHANICAL BACK PAIN: ICD-10-CM

## 2025-01-02 DIAGNOSIS — E04.9 GOITER: ICD-10-CM

## 2025-01-02 DIAGNOSIS — E06.3 HYPOTHYROIDISM DUE TO HASHIMOTO THYROIDITIS: ICD-10-CM

## 2025-01-02 DIAGNOSIS — M15.0 PRIMARY OSTEOARTHRITIS INVOLVING MULTIPLE JOINTS: ICD-10-CM

## 2025-01-02 DIAGNOSIS — R73.03 PREDIABETES: ICD-10-CM

## 2025-01-02 DIAGNOSIS — N32.89 BLADDER INSTABILITY: ICD-10-CM

## 2025-01-02 DIAGNOSIS — K21.9 GASTROESOPHAGEAL REFLUX DISEASE WITHOUT ESOPHAGITIS: ICD-10-CM

## 2025-01-02 DIAGNOSIS — N81.89 PELVIC RELAXATION: ICD-10-CM

## 2025-01-02 DIAGNOSIS — I25.10 CORONARY ARTERY CALCIFICATION SEEN ON CT SCAN: ICD-10-CM

## 2025-01-02 DIAGNOSIS — G43.009 MIGRAINE WITHOUT AURA AND WITHOUT STATUS MIGRAINOSUS, NOT INTRACTABLE: ICD-10-CM

## 2025-01-02 DIAGNOSIS — J44.9 COPD MIXED TYPE: ICD-10-CM

## 2025-01-02 PROBLEM — R79.89 ELEVATED TSH: Status: RESOLVED | Noted: 2019-07-08 | Resolved: 2025-01-02

## 2025-01-02 PROCEDURE — 96160 PT-FOCUSED HLTH RISK ASSMT: CPT | Performed by: GENERAL PRACTICE

## 2025-01-02 PROCEDURE — G0439 PPPS, SUBSEQ VISIT: HCPCS | Performed by: GENERAL PRACTICE

## 2025-01-02 PROCEDURE — 3078F DIAST BP <80 MM HG: CPT | Performed by: GENERAL PRACTICE

## 2025-01-02 PROCEDURE — 3074F SYST BP LT 130 MM HG: CPT | Performed by: GENERAL PRACTICE

## 2025-01-02 PROCEDURE — 1170F FXNL STATUS ASSESSED: CPT | Performed by: GENERAL PRACTICE

## 2025-01-02 PROCEDURE — 99214 OFFICE O/P EST MOD 30 MIN: CPT | Performed by: GENERAL PRACTICE

## 2025-01-02 RX ORDER — FLUCONAZOLE 150 MG/1
150 TABLET ORAL DAILY PRN
Qty: 6 TABLET | Refills: 0 | Status: SHIPPED | OUTPATIENT
Start: 2025-01-02

## 2025-01-02 NOTE — ASSESSMENT & PLAN NOTE
Encouraged to continue to pursue weight bearing activities while exercising joint protection.  Continue current medication  Orders:    Vitamin D,25-Hydroxy; Future

## 2025-01-02 NOTE — ASSESSMENT & PLAN NOTE
Encouraged to continue to work on her diet and exercise plan.  Continue current medication  Orders:    Comprehensive Metabolic Panel; Future    Lipid Panel; Future

## 2025-01-02 NOTE — ASSESSMENT & PLAN NOTE
Lengthy discussion regarding the potential sequela of continued tobacco use and the options with respect to cessation.  Patient uninterested in pursuing at present but will consider.

## 2025-01-02 NOTE — ASSESSMENT & PLAN NOTE
Reminded regarding risk factor modification with an emphasis on tobacco cessation.  Continue low-dose ASA  Orders:    CBC & Differential; Future

## 2025-01-02 NOTE — ASSESSMENT & PLAN NOTE
Hypertension: at goal. Evidence of target organ damage: evidence of coronary artery calcifications on previous imaging.   As above  Continue current medication  Orders:    CBC & Differential; Future    Comprehensive Metabolic Panel; Future

## 2025-01-02 NOTE — ASSESSMENT & PLAN NOTE
Clinically euthyroid.  Continue current medication.  Scheduled for updated labs in 2 months  Orders:    TSH; Future

## 2025-01-02 NOTE — PROGRESS NOTES
Subjective   The ABCs of the Annual Wellness Visit  Medicare Wellness Visit    Albino Estrella is a 74 y.o. patient who presents for a Medicare Wellness Visit.    The following portions of the patient's history were reviewed and   updated as appropriate: allergies, current medications, past family history, past medical history, past social history, past surgical history, and problem list.    Compared to one year ago, the patient's physical   health is the same.  Compared to one year ago, the patient's mental   health is the same.    Recent Hospitalizations:  She was not admitted to the hospital during the last year.     Current Medical Providers:  Patient Care Team:  Stan Alvarado MD as PCP - General  Stan Alvarado MD as PCP - Family Medicine    Outpatient Medications Prior to Visit   Medication Sig Dispense Refill    aspirin 81 MG EC tablet Take 1 tablet by mouth Daily. 30 tablet 5    atorvastatin (LIPITOR) 40 MG tablet Take 1 tablet by mouth every night at bedtime. 90 tablet 3    denosumab (PROLIA) 60 MG/ML solution prefilled syringe syringe Inject 1 mL under the skin into the appropriate area as directed Every 6 (Six) Months. 1 each 1    dilTIAZem (TIAZAC) 360 MG 24 hr capsule Take 1 capsule by mouth Daily. 90 capsule 3    estradiol (ESTRACE) 0.1 MG/GM vaginal cream INSERT 2 GRAMS VAGINALLY TWICE WEEKLY AS DIRECTED 42.5 g 5    Fluticasone-Umeclidin-Vilant (Trelegy Ellipta) 100-62.5-25 MCG/ACT inhaler Inhale 1 puff Daily. 3 each 3    ipratropium-albuterol (DUO-NEB) 0.5-2.5 mg/3 ml nebulizer Take 3 mL by nebulization 4 (Four) Times a Day As Needed for Wheezing or Shortness of Air. 540 mL 5    levothyroxine (SYNTHROID, LEVOTHROID) 50 MCG tablet Take 1 tablet by mouth Daily. 90 tablet 1    montelukast (SINGULAIR) 10 MG tablet Take 1 tablet by mouth Every Night. 90 tablet 3    pantoprazole (PROTONIX) 40 MG EC tablet TAKE 1 TABLET BY MOUTH TWICE DAILY 180 tablet 3    spironolactone (ALDACTONE) 25  MG tablet Take 1 tablet by mouth Every Morning. 90 tablet 3    SUMAtriptan (IMITREX) 100 MG tablet Take one tablet at onset of headache. May repeat dose one time in 2 hours if headache not relieved. 27 tablet 3    vitamin B-12 (CYANOCOBALAMIN) 1000 MCG tablet Take 1 tablet by mouth Daily. 90 tablet 3    Zoster Vac Recomb Adjuvanted (Shingrix) 50 MCG/0.5ML reconstituted suspension Inject 0.5 mL into the appropriate muscle as directed by prescriber See Admin Instructions. Repeat in 2-6 months 1 each 1     No facility-administered medications prior to visit.     No opioid medication identified on active medication list. I have reviewed chart for other potential  high risk medication/s and harmful drug interactions in the elderly.      Aspirin is on active medication list. Aspirin use is indicated based on review of current medical condition/s. Pros and cons of this therapy have been discussed today. Benefits of this medication outweigh potential harm.  Patient has been encouraged to continue taking this medication.  .    Patient Active Problem List   Diagnosis    Bladder instability    COPD mixed type    Dyspareunia    Gastroesophageal reflux disease without esophagitis    H/O recurrent urinary tract infection    Mixed hyperlipidemia    Essential hypertension    IBS (irritable bowel syndrome)    Prediabetes    Primary osteoarthritis    Migraine without aura and without status migrainosus, not intractable    Encounter for immunization    Healthcare maintenance    Smoker    Pelvic relaxation    Age-related osteoporosis without current pathological fracture    Goiter    Coronary artery calcification seen on CT scan    Mechanical back pain    Encounter for screening mammogram for breast cancer    Hypothyroidism due to Hashimoto thyroiditis     Advance Care Planning Advance Directive is not on file.  ACP discussion was held with the patient during this visit. Patient does not have an advance directive, information provided.  "     Objective   Vitals:    25 1504   BP: 116/60   Pulse: 76   Resp: 16   Temp: 98.6 °F (37 °C)   TempSrc: Temporal   SpO2: 96%   Weight: 46.3 kg (102 lb)   Height: 154.9 cm (61\")     Estimated body mass index is 19.27 kg/m² as calculated from the following:    Height as of this encounter: 154.9 cm (61\").    Weight as of this encounter: 46.3 kg (102 lb).    BMI is within normal parameters. No other follow-up for BMI required.    Does the patient have evidence of cognitive impairment? No                                                                                           Health  Risk Assessment    Smoking Status:  Social History     Tobacco Use   Smoking Status Every Day    Current packs/day: 1.00    Average packs/day: 1 pack/day for 43.0 years (43.0 ttl pk-yrs)    Types: Cigarettes    Passive exposure: Current   Smokeless Tobacco Never     Alcohol Consumption:  Social History     Substance and Sexual Activity   Alcohol Use No     Fall Risk Screen  STEADI Fall Risk Assessment was completed, and patient is at LOW risk for falls.Assessment completed on:2025    Depression Screening   Little interest or pleasure in doing things? Not at all   Feeling down, depressed, or hopeless? Not at all   PHQ-2 Total Score 0      Health Habits and Functional and Cognitive Screenin/2/2025     3:05 PM   Functional & Cognitive Status   Do you have difficulty preparing food and eating? No   Do you have difficulty bathing yourself, getting dressed or grooming yourself? No   Do you have difficulty using the toilet? No   Do you have difficulty moving around from place to place? No   Do you have trouble with steps or getting out of a bed or a chair? No   Current Diet Well Balanced Diet   Dental Exam Not up to date   Eye Exam Not up to date   Exercise (times per week) 7 times per week   Current Exercises Include Walking   Do you need help using the phone?  No   Are you deaf or do you have serious difficulty hearing?  No "   Do you need help to go to places out of walking distance? No   Do you need help shopping? No   Do you need help preparing meals?  No   Do you need help with housework?  No   Do you need help with laundry? No   Do you need help taking your medications? No   Do you need help managing money? No   Do you ever drive or ride in a car without wearing a seat belt? No   Have you felt unusual stress, anger or loneliness in the last month? No   Who do you live with? Spouse   If you need help, do you have trouble finding someone available to you? No   Have you been bothered in the last four weeks by sexual problems? No   Do you have difficulty concentrating, remembering or making decisions? No           Age-appropriate Screening Schedule:  Refer to the list below for future screening recommendations based on patient's age, sex and/or medical conditions. Orders for these recommended tests are listed in the plan section. The patient has been provided with a written plan.    Health Maintenance List  Health Maintenance   Topic Date Due    ZOSTER VACCINE (1 of 2) Never done    PAP SMEAR  06/13/2017    DXA SCAN  11/19/2022    COVID-19 Vaccine (4 - 2024-25 season) 09/01/2024    TDAP/TD VACCINES (2 - Td or Tdap) 09/22/2024    LUNG CANCER SCREENING  03/26/2025    LIPID PANEL  10/15/2025    ANNUAL WELLNESS VISIT  01/02/2026    MAMMOGRAM  09/19/2026    COLORECTAL CANCER SCREENING  10/26/2028    HEPATITIS C SCREENING  Completed    INFLUENZA VACCINE  Completed    Pneumococcal Vaccine 65+  Completed                                                                                                                                              CMS Preventative Services Quick Reference  Risk Factors Identified During Encounter  Chronic Pain: Natural history and expected course discussed. Questions answered.  Immunizations Discussed/Encouraged: Tdap, Shingrix, COVID19, and RSV (Respiratory Syncytial Virus)    The above risks/problems have been  discussed with the patient.  Pertinent information has been shared with the patient in the After Visit Summary.  An After Visit Summary and PPPS were made available to the patient.    Follow Up:   Next Medicare Wellness visit to be scheduled in 1 year.     Additional E&M Note during same encounter follows:  Patient has additional, significant, and separately identifiable condition(s)/problem(s) that require work above and beyond the Medicare Wellness Visit     Chief Complaint  She returns for a scheduled reassessment of multiple medical problems including gastroesophageal reflux disease, irritable bowel syndrome with diarrhea, chronic obstructive pulmonary disease, essential hypertension, hyperlipidemia, chronic low back pain, chronic pelvic pressure, recurrent urinary tract infections, and recently diagnosed hypothyroidism    Subjective     HPI  Albino is also being seen today for additional medical problem/s.    Hypothyroidism  She was started on levothyroxine 50 daily following her most recent labs.  She has noted some improvement in her appetite and energy levels.  She has not experienced any apparent side effects  Lab Results   Component Value Date    TSH 11.400 (H) 10/15/2024    TSH 5.217 (H) 01/12/2017     Gastroesophageal Reflux Disease  She remains heartburn free on pantoprazole.  She continues to deny any difficulty swallowing, regurgitation, vomiting, or abdominal pain  Lab Results   Component Value Date    WBC 11.95 (H) 10/15/2024    HGB 13.4 10/15/2024    HCT 38.1 10/15/2024    MCV 98.2 (H) 10/15/2024     10/15/2024     IBS with Diarrhea  She continues to have intermittent diarrhea.  She has had no problems at night.  With the exception of occasional mild nausea, she continues to deny any associated symptoms.  There is no history of any abdominal pain, hematochezia, or melena, and she continues to deny any fever, chills, or night sweats.  Her last colonoscopy was on 10/26/2018 at which time  several hyperplastic polyps were removed.  She remains uninterested in pursuing an updated study    COPD  She continues to experience intermittent SOB, cough and wheezing.  There is no history of any chest pain or hemoptysis, and she continues to deny any fever, chills, or night sweats. She remains on trelegy and feels that it helps. She continues to use nebulized albuterol 4 times daily. She continues to smoke 1 pack per day. Updated low dose CT of the chest performed on 3/26/2024 was reported as showing COPD, a benign appearing stable pleural-based RLL nodule, coronary artery calcifications, and a stable substernal thyroid.  A one year follow-up was recommended    Essential Hypertension  She admits to dyspnea on exertion.  She continues to deny any chest pain, palpitations, orthopnea, paroxysmal nocturnal dyspnea or peripheral edema.  She remains on diltiazem and spironolactone.   Lab Results   Component Value Date    GLUCOSE 99 10/15/2024    BUN 6 (L) 10/15/2024    CREATININE 0.71 10/15/2024     10/15/2024    K 4.0 10/15/2024     10/15/2024    CALCIUM 8.9 10/15/2024    PROTEINTOT 8.0 01/12/2017    ALBUMIN 4.0 10/15/2024    ALT 13 10/15/2024    AST 15 10/15/2024    ALKPHOS 73 10/15/2024    BILITOT 0.5 10/15/2024    GLOB 3.4 01/12/2017    AGRATIO 1.4 (L) 01/12/2017    BCR 8.5 10/15/2024    ANIONGAP 3.2 (L) 01/12/2017     Hyperlipidemia  Her compliance with treatment has been fair.  She remains fairly active about the house.  She remains on atorvastatin.   Lab Results   Component Value Date    CHOL 197 01/12/2017    CHLPL 175 10/15/2024    TRIG 105 10/15/2024    HDL 61 (H) 10/15/2024    LDL 95 10/15/2024     Chronic Low Back Pain  She feels her back pain remains at baseline. The pain is described as a bilateral ache.  This does not radiate elsewhere, and remains unassociated with any other symptoms.  She continues to deny any changes in her strength, sensation, or bowel/bladder control and there is no  "history of any fever, chills, or night sweats.  The pain is worse with prolonged standing and improves with rest or change of position.    Pelvic Pressure  She was fit for a ring by VARINDER Oropeza) at [facility] on 11/1/23 with a prompt improvement in both her sense of pressure and in her urinary frequency, urgency, dysuria, and incontinence. She continues to deny any hematuria, vaginal bleeding or discharge. She has a history of recurrent urinary tract infections and was recently treated with a course of nitrofurantoin.  She has had vaginal pruritus and a white discharge since.  She was treated with a single dose of fluconazole with some improvement.     Labs  Most recent vitamin D 31.8    Review of Systems   Constitutional:  Positive for fatigue. Negative for appetite change, chills and fever.   HENT:  Positive for congestion and rhinorrhea. Negative for ear pain, postnasal drip, sneezing, sore throat and voice change.    Eyes:  Negative for visual disturbance.   Respiratory:  Positive for cough, shortness of breath and wheezing.    Cardiovascular:  Negative for chest pain, palpitations and leg swelling.   Gastrointestinal:  Negative for abdominal pain, blood in stool, constipation, diarrhea, nausea and vomiting.   Genitourinary:  Positive for frequency, pelvic pain (pressure), urgency and vaginal discharge. Negative for dysuria, hematuria and vaginal bleeding.   Musculoskeletal:  Positive for back pain. Negative for arthralgias, joint swelling, myalgias and neck pain.   Skin:  Negative for rash.   Neurological:  Positive for dizziness. Negative for weakness and numbness.   Psychiatric/Behavioral:  Negative for dysphoric mood and sleep disturbance. The patient is not nervous/anxious.         Objective   Vital Signs:  /60   Pulse 76   Temp 98.6 °F (37 °C) (Temporal)   Resp 16   Ht 154.9 cm (61\")   Wt 46.3 kg (102 lb)   SpO2 96%   BMI 19.27 kg/m²     Physical Exam  Constitutional:       " General: She is not in acute distress.     Appearance: Normal appearance. She is well-developed. She is not diaphoretic.      Comments: Accompanied by her . Bright and in fair spirits. No apparent distress. No pallor, jaundice, diaphoresis, or cyanosis.   HENT:      Head: Atraumatic.      Right Ear: Tympanic membrane, ear canal and external ear normal. There is impacted cerumen.      Left Ear: Tympanic membrane, ear canal and external ear normal. There is impacted cerumen.      Mouth/Throat:      Lips: No lesions.      Mouth: Mucous membranes are moist. No oral lesions.      Pharynx: No oropharyngeal exudate or posterior oropharyngeal erythema.   Eyes:      General: Lids are normal.      Extraocular Movements: Extraocular movements intact.      Conjunctiva/sclera: Conjunctivae normal.      Pupils: Pupils are equal.   Neck:      Thyroid: Thyromegaly (right lobe more prominent then the left) present. No thyroid mass.      Vascular: No carotid bruit or JVD.      Trachea: Trachea normal. No tracheal deviation.   Cardiovascular:      Rate and Rhythm: Normal rate and regular rhythm.      Heart sounds: Normal heart sounds, S1 normal and S2 normal. No murmur heard.     No gallop.   Pulmonary:      Effort: Pulmonary effort is normal.      Breath sounds: Examination of the right-lower field reveals decreased breath sounds. Examination of the left-lower field reveals decreased breath sounds. Decreased breath sounds and wheezing (diffuse - mild) present. No rales.      Comments: Pulmonary hyperinflation  Abdominal:      General: Bowel sounds are normal. There is no distension or abdominal bruit.      Palpations: Abdomen is soft. There is no hepatomegaly, splenomegaly or mass.      Tenderness: There is no abdominal tenderness.      Hernia: No hernia is present.   Musculoskeletal:      Right lower leg: No edema.      Left lower leg: No edema.   Lymphadenopathy:      Head:      Right side of head: No submental,  submandibular, tonsillar, preauricular, posterior auricular or occipital adenopathy.      Left side of head: No submental, submandibular, tonsillar, preauricular, posterior auricular or occipital adenopathy.      Cervical: No cervical adenopathy.      Upper Body:      Right upper body: No supraclavicular adenopathy.      Left upper body: No supraclavicular adenopathy.   Skin:     General: Skin is warm.      Coloration: Skin is not cyanotic, jaundiced or pale.      Findings: No rash.      Nails: There is no clubbing.   Neurological:      Mental Status: She is alert and oriented to person, place, and time.      Cranial Nerves: No cranial nerve deficit, dysarthria or facial asymmetry.      Sensory: No sensory deficit.      Motor: No tremor.      Coordination: Coordination normal.      Gait: Gait normal.   Psychiatric:         Attention and Perception: Attention normal.         Mood and Affect: Mood normal.         Speech: Speech normal.         Behavior: Behavior normal.         Thought Content: Thought content normal.           Assessment and Plan   Additional age appropriate preventative wellness advice topics were discussed during today's preventative wellness exam(some topics already addressed during AWV portion of the note above):    Physical Activity: Advised cardiovascular activity 150 minutes per week as tolerated. (example brisk walk for 30 minutes, 5 days a week).     Nutrition: Discussed nutrition plan with patient. Information shared in after visit summary. Goal is for a well balanced diet to enhance overall health.     Tobacco Misuse Discussion: Information shared in after visit summary.     Mixed hyperlipidemia   Encouraged to continue to work on her diet and exercise plan.  Continue current medication  Orders:    Comprehensive Metabolic Panel; Future    Lipid Panel; Future    Essential hypertension   Hypertension: at goal. Evidence of target organ damage:  evidence of coronary artery calcifications on  previous imaging .   As above  Continue current medication  Orders:    CBC & Differential; Future    Comprehensive Metabolic Panel; Future    Coronary artery calcification seen on CT scan  Reminded regarding risk factor modification with an emphasis on tobacco cessation.  Continue low-dose ASA  Orders:    CBC & Differential; Future    Prediabetes  As above.  Will continue to monitor  Orders:    Comprehensive Metabolic Panel; Future    Goiter       Other irritable bowel syndrome  Orders:    CBC & Differential; Future    Gastroesophageal reflux disease without esophagitis  Orders:    CBC & Differential; Future    Pelvic relaxation  Follow up with gynecology       Bladder instability       Healthcare maintenance  Patient has already received a flu shot fall.  Recommended RSV and an updated COVID-19 shot  Reminded that she is due for shingles vaccination  We will arrange an updated low-dose CT of the chest at her return       Primary osteoarthritis involving multiple joints       Mechanical back pain       Age-related osteoporosis without current pathological fracture  Encouraged to continue to pursue weight bearing activities while exercising joint protection.  Continue current medication  Orders:    Vitamin D,25-Hydroxy; Future    Migraine without aura and without status migrainosus, not intractable       COPD mixed type   COPD is stable.  Reminded of the importance of smoking cessation  Encouraged to remain as active as symptoms allow for  Orders:    CBC & Differential; Future    Smoker  Lengthy discussion regarding the potential sequela of continued tobacco use and the options with respect to cessation.  Patient uninterested in pursuing at present but will consider.       Hypothyroidism due to Hashimoto thyroiditis  Clinically euthyroid.  Continue current medication.  Scheduled for updated labs in 2 months  Orders:    TSH; Future    H/O recurrent urinary tract infection  Orders:    fluconazole (DIFLUCAN) 150 MG  tablet; Take 1 tablet by mouth Daily As Needed (yeast infection).            Follow Up   Return in about 3 months (around 4/4/2025).  Patient was given instructions and counseling regarding her condition or for health maintenance advice. Please see specific information pulled into the AVS if appropriate.

## 2025-01-02 NOTE — ASSESSMENT & PLAN NOTE
COPD is stable.  Reminded of the importance of smoking cessation  Encouraged to remain as active as symptoms allow for  Orders:    CBC & Differential; Future

## 2025-01-02 NOTE — ASSESSMENT & PLAN NOTE
Patient has already received a flu shot fall.  Recommended RSV and an updated COVID-19 shot  Reminded that she is due for shingles vaccination  We will arrange an updated low-dose CT of the chest at her return

## 2025-01-03 NOTE — ASSESSMENT & PLAN NOTE
Orders:    fluconazole (DIFLUCAN) 150 MG tablet; Take 1 tablet by mouth Daily As Needed (yeast infection).

## 2025-01-08 ENCOUNTER — PATIENT ROUNDING (BHMG ONLY) (OUTPATIENT)
Dept: FAMILY MEDICINE CLINIC | Facility: CLINIC | Age: 75
End: 2025-01-08
Payer: MEDICARE

## 2025-01-08 ENCOUNTER — TELEPHONE (OUTPATIENT)
Dept: FAMILY MEDICINE CLINIC | Facility: CLINIC | Age: 75
End: 2025-01-08
Payer: MEDICARE

## 2025-01-08 NOTE — PROGRESS NOTES
January 8, 2025    Hello, may I speak with Albino Estrella?    My name is Serena       I am  with [facility]  [address] [place] [id number] [phone].    Before we get started may I verify your date of birth? 1950    I am calling to officially welcome you to our practice and ask about your recent visit. Is this a good time to talk? yes    Tell me about your visit with us. What things went well?  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 Do you have recommendations on ways we may improve?  no    Overall were you satisfied with your first visit to our practice? yes       I appreciate you taking the time to speak with me today. Is there anything else I can do for you? no      Thank you, and have a great day.

## 2025-01-08 NOTE — TELEPHONE ENCOUNTER
----- Message from Stan Alvarado sent at 1/8/2025  1:18 PM EST -----  Perfect - thanks!  ----- Message -----  From: Ariela Baxter MA  Sent: 1/8/2025   1:15 PM EST  To: Stan Alvarado MD    She gets her next one on the 12th.  ----- Message -----  From: Stan Alvarado MD  Sent: 1/2/2025   9:39 PM EST  To: Ariela Baxter MA    Has she gotten any Prolia injections for her osteoporosis lately?

## 2025-02-09 DIAGNOSIS — E06.3 HYPOTHYROIDISM DUE TO HASHIMOTO THYROIDITIS: ICD-10-CM

## 2025-02-10 RX ORDER — LEVOTHYROXINE SODIUM 50 UG/1
50 TABLET ORAL DAILY
Qty: 90 TABLET | Refills: 1 | Status: SHIPPED | OUTPATIENT
Start: 2025-02-10

## 2025-04-07 ENCOUNTER — OFFICE VISIT (OUTPATIENT)
Dept: FAMILY MEDICINE CLINIC | Facility: CLINIC | Age: 75
End: 2025-04-07
Payer: MEDICARE

## 2025-04-07 VITALS
HEIGHT: 61 IN | TEMPERATURE: 97.9 F | WEIGHT: 101 LBS | HEART RATE: 84 BPM | BODY MASS INDEX: 19.07 KG/M2 | RESPIRATION RATE: 15 BRPM | SYSTOLIC BLOOD PRESSURE: 126 MMHG | DIASTOLIC BLOOD PRESSURE: 64 MMHG | OXYGEN SATURATION: 95 %

## 2025-04-07 DIAGNOSIS — N32.89 BLADDER INSTABILITY: ICD-10-CM

## 2025-04-07 DIAGNOSIS — G43.009 MIGRAINE WITHOUT AURA AND WITHOUT STATUS MIGRAINOSUS, NOT INTRACTABLE: ICD-10-CM

## 2025-04-07 DIAGNOSIS — Z87.891 PERSONAL HISTORY OF NICOTINE DEPENDENCE: ICD-10-CM

## 2025-04-07 DIAGNOSIS — F17.200 SMOKER: ICD-10-CM

## 2025-04-07 DIAGNOSIS — R73.03 PREDIABETES: ICD-10-CM

## 2025-04-07 DIAGNOSIS — I25.10 CORONARY ARTERY CALCIFICATION SEEN ON CT SCAN: ICD-10-CM

## 2025-04-07 DIAGNOSIS — E04.9 GOITER: ICD-10-CM

## 2025-04-07 DIAGNOSIS — E06.3 HYPOTHYROIDISM DUE TO HASHIMOTO THYROIDITIS: ICD-10-CM

## 2025-04-07 DIAGNOSIS — M15.0 PRIMARY OSTEOARTHRITIS INVOLVING MULTIPLE JOINTS: ICD-10-CM

## 2025-04-07 DIAGNOSIS — M81.0 AGE-RELATED OSTEOPOROSIS WITHOUT CURRENT PATHOLOGICAL FRACTURE: ICD-10-CM

## 2025-04-07 DIAGNOSIS — Z87.440 H/O RECURRENT URINARY TRACT INFECTION: ICD-10-CM

## 2025-04-07 DIAGNOSIS — I10 ESSENTIAL HYPERTENSION: ICD-10-CM

## 2025-04-07 DIAGNOSIS — K58.8 OTHER IRRITABLE BOWEL SYNDROME: ICD-10-CM

## 2025-04-07 DIAGNOSIS — B37.0 ORAL CANDIDIASIS: ICD-10-CM

## 2025-04-07 DIAGNOSIS — N81.89 PELVIC RELAXATION: ICD-10-CM

## 2025-04-07 DIAGNOSIS — E78.2 MIXED HYPERLIPIDEMIA: Primary | ICD-10-CM

## 2025-04-07 DIAGNOSIS — Z00.00 HEALTHCARE MAINTENANCE: ICD-10-CM

## 2025-04-07 DIAGNOSIS — K21.9 GASTROESOPHAGEAL REFLUX DISEASE WITHOUT ESOPHAGITIS: ICD-10-CM

## 2025-04-07 DIAGNOSIS — J44.9 COPD MIXED TYPE: ICD-10-CM

## 2025-04-07 RX ORDER — ATORVASTATIN CALCIUM 40 MG/1
40 TABLET, FILM COATED ORAL
Qty: 90 TABLET | Refills: 3 | Status: SHIPPED | OUTPATIENT
Start: 2025-04-07

## 2025-04-07 RX ORDER — FLUCONAZOLE 150 MG/1
150 TABLET ORAL DAILY PRN
Qty: 6 TABLET | Refills: 0 | Status: SHIPPED | OUTPATIENT
Start: 2025-04-07

## 2025-04-07 RX ORDER — NYSTATIN 100000 [USP'U]/ML
500000 SUSPENSION ORAL 4 TIMES DAILY
Qty: 473 ML | Refills: 0 | Status: SHIPPED | OUTPATIENT
Start: 2025-04-07

## 2025-04-07 RX ORDER — IPRATROPIUM BROMIDE AND ALBUTEROL SULFATE 2.5; .5 MG/3ML; MG/3ML
3 SOLUTION RESPIRATORY (INHALATION) 4 TIMES DAILY PRN
Qty: 540 ML | Refills: 5 | Status: SHIPPED | OUTPATIENT
Start: 2025-04-07

## 2025-04-07 RX ORDER — SUMATRIPTAN SUCCINATE 100 MG/1
TABLET ORAL
Qty: 27 TABLET | Refills: 3 | Status: SHIPPED | OUTPATIENT
Start: 2025-04-07

## 2025-04-07 RX ORDER — SPIRONOLACTONE 25 MG/1
25 TABLET ORAL EVERY MORNING
Qty: 90 TABLET | Refills: 3 | Status: SHIPPED | OUTPATIENT
Start: 2025-04-07

## 2025-04-07 RX ORDER — MONTELUKAST SODIUM 10 MG/1
10 TABLET ORAL NIGHTLY
Qty: 90 TABLET | Refills: 3 | Status: SHIPPED | OUTPATIENT
Start: 2025-04-07

## 2025-04-07 RX ORDER — FLUTICASONE FUROATE, UMECLIDINIUM BROMIDE AND VILANTEROL TRIFENATATE 100; 62.5; 25 UG/1; UG/1; UG/1
1 POWDER RESPIRATORY (INHALATION) DAILY
Qty: 4 EACH | Refills: 0 | COMMUNITY
Start: 2025-04-07

## 2025-04-07 RX ORDER — DILTIAZEM HYDROCHLORIDE 360 MG/1
360 CAPSULE, EXTENDED RELEASE ORAL DAILY
Qty: 90 CAPSULE | Refills: 3 | Status: SHIPPED | OUTPATIENT
Start: 2025-04-07

## 2025-04-07 RX ORDER — LEVOTHYROXINE SODIUM 50 UG/1
50 TABLET ORAL DAILY
Qty: 90 TABLET | Refills: 1 | Status: SHIPPED | OUTPATIENT
Start: 2025-04-07

## 2025-04-07 RX ORDER — PANTOPRAZOLE SODIUM 40 MG/1
40 TABLET, DELAYED RELEASE ORAL 2 TIMES DAILY
Qty: 180 TABLET | Refills: 3 | Status: SHIPPED | OUTPATIENT
Start: 2025-04-07

## 2025-04-07 RX ORDER — LANOLIN ALCOHOL/MO/W.PET/CERES
1000 CREAM (GRAM) TOPICAL DAILY
Qty: 90 TABLET | Refills: 3 | Status: SHIPPED | OUTPATIENT
Start: 2025-04-07

## 2025-04-07 NOTE — PROGRESS NOTES
Aicha Estrella is a 74 y.o. female.     Chief Complaint  She returns for a scheduled reassessment of multiple medical problems including gastroesophageal reflux disease, irritable bowel syndrome with diarrhea, chronic obstructive pulmonary disease, essential hypertension, hyperlipidemia, hypothyroidism, chronic low back pain, and pelvic relaxation    History of Present Illness     Gastroesophageal Reflux Disease  She remains heartburn free on pantoprazole.  She continues to deny any difficulty swallowing, regurgitation, vomiting, or abdominal pain    IBS with Diarrhea  She continues to have intermittent diarrhea.  She has had no problems at night.  With the exception of occasional mild nausea, she continues to deny any associated symptoms.  There is no history of any abdominal pain, hematochezia, or melena, and she continues to deny any fever, chills, or night sweats.  Her last colonoscopy was on 10/26/2018 at which time several hyperplastic polyps were removed.  She remains uninterested in pursuing an updated study    COPD  She continues to experience intermittent SOB, cough and wheezing.  There is no history of any chest pain or hemoptysis, and she continues to deny any fever, chills, or night sweats. She remains on trelegy and feels that it helps. She continues to use nebulized albuterol 4 times daily. She continues to smoke 1 pack per day. Updated low dose CT of the chest performed on 3/26/2024 was reported as showing COPD, a benign appearing stable pleural-based RLL nodule, coronary artery calcifications, and a stable substernal thyroid.  A one year follow-up was recommended    Essential Hypertension  She admits to dyspnea on exertion.  She continues to deny any chest pain, palpitations, orthopnea, paroxysmal nocturnal dyspnea or peripheral edema.  She remains on diltiazem and spironolactone.     Hyperlipidemia  Her compliance with treatment has been fair.  She remains fairly active about the house.   She remains on atorvastatin.     Hypothyroidism  She is currently on levothyroxine 50 daily with no apparent side effects.  She has noted some improvement in her appetite and energy levels.     Chronic Low Back Pain  She feels her back pain remains at baseline. The pain is described as a bilateral ache.  This does not radiate elsewhere, and remains unassociated with any other symptoms.  She continues to deny any changes in her strength, sensation, or bowel/bladder control and there is no history of any fever, chills, or night sweats.  The pain is worse with prolonged standing and improves with rest or change of position.    Pelvic Pressure  She was fit for a ring by VARINDER Oropeza) at [facility] on 11/1/23 with a prompt improvement in both her sense of pressure and in her urinary frequency, urgency, dysuria, and incontinence. She continues to deny any hematuria, vaginal bleeding or discharge.     The following portions of the patient's history were reviewed and updated as appropriate: allergies, current medications, past medical history, past social history, and problem list.    Review of Systems   Constitutional:  Positive for fatigue. Negative for chills and fever.   HENT:  Positive for congestion, rhinorrhea and sneezing. Negative for ear pain, sore throat and voice change.    Eyes:  Negative for visual disturbance.   Respiratory:  Positive for cough, shortness of breath and wheezing.    Cardiovascular:  Negative for chest pain, palpitations and leg swelling.   Gastrointestinal:  Positive for diarrhea and nausea. Negative for abdominal pain, blood in stool, constipation and vomiting.   Genitourinary:  Positive for frequency, pelvic pressure and urgency. Negative for dysuria, hematuria, vaginal bleeding and vaginal pain.   Musculoskeletal:  Positive for back pain. Negative for arthralgias, joint swelling and myalgias.   Skin:  Negative for rash.   Neurological:  Positive for dizziness  (occasional). Negative for weakness and numbness.   Psychiatric/Behavioral:  Negative for sleep disturbance and depressed mood. The patient is not nervous/anxious.      Objective   Physical Exam  Constitutional:       General: She is not in acute distress.     Appearance: Normal appearance. She is well-developed. She is not diaphoretic.      Comments: Accompanied by her . Bright and in fair spirits. No apparent distress. No pallor, jaundice, diaphoresis, or cyanosis.   HENT:      Head: Atraumatic.      Right Ear: Tympanic membrane, ear canal and external ear normal. There is impacted cerumen.      Left Ear: Tympanic membrane, ear canal and external ear normal. There is impacted cerumen.      Mouth/Throat:      Lips: No lesions.      Mouth: Mucous membranes are moist. Oral lesions (scattered small whitish plaques about the soft palate uvula and tonsillar arches) present.      Pharynx: No oropharyngeal exudate or posterior oropharyngeal erythema.   Eyes:      General: Lids are normal.      Extraocular Movements: Extraocular movements intact.      Conjunctiva/sclera: Conjunctivae normal.      Pupils: Pupils are equal.   Neck:      Thyroid: Thyromegaly (right lobe more prominent then the left) present. No thyroid mass.      Vascular: No carotid bruit or JVD.      Trachea: Trachea normal. No tracheal deviation.   Cardiovascular:      Rate and Rhythm: Normal rate and regular rhythm.      Heart sounds: Normal heart sounds, S1 normal and S2 normal. No murmur heard.     No gallop.   Pulmonary:      Effort: Pulmonary effort is normal.      Breath sounds: Examination of the right-lower field reveals decreased breath sounds. Examination of the left-lower field reveals decreased breath sounds. Decreased breath sounds and wheezing (diffuse - mild) present. No rales.      Comments: Pulmonary hyperinflation  Abdominal:      General: Bowel sounds are normal. There is no distension.   Musculoskeletal:      Right lower leg: No  edema.      Left lower leg: No edema.   Lymphadenopathy:      Head:      Right side of head: No submental, submandibular, tonsillar, preauricular, posterior auricular or occipital adenopathy.      Left side of head: No submental, submandibular, tonsillar, preauricular, posterior auricular or occipital adenopathy.      Cervical: No cervical adenopathy.      Upper Body:      Right upper body: No supraclavicular adenopathy.      Left upper body: No supraclavicular adenopathy.   Skin:     General: Skin is warm.      Coloration: Skin is not cyanotic, jaundiced or pale.      Findings: No rash.      Nails: There is no clubbing.   Neurological:      Mental Status: She is alert and oriented to person, place, and time.      Cranial Nerves: No cranial nerve deficit, dysarthria or facial asymmetry.      Sensory: No sensory deficit.      Motor: No tremor.      Coordination: Coordination normal.      Gait: Gait normal.   Psychiatric:         Attention and Perception: Attention normal.         Mood and Affect: Mood normal.         Speech: Speech normal.         Behavior: Behavior normal.         Thought Content: Thought content normal.       Assessment & Plan   Problems Addressed this Visit          Cardiac and Vasculature    Coronary artery calcification seen on CT scan  Reminded regarding risk factor modification with an emphasis on tobacco cessation.  Continue low-dose ASA.    Relevant Medications    dilTIAZem (TIAZAC) 360 MG 24 hr capsule    Essential hypertension   Hypertension: at goal. Evidence of target organ damage:  coronary artery calcifications noted on previous imaging .  Encouraged to continue to work on diet and exercise plan.   Continue current medication    Relevant Medications    spironolactone (ALDACTONE) 25 MG tablet    dilTIAZem (TIAZAC) 360 MG 24 hr capsule    Mixed hyperlipidemia  As above.   Continue current medication.  Updated labs will be arranged at return.    Relevant Medications    atorvastatin  (LIPITOR) 40 MG tablet       Endocrine and Metabolic    Goiter    Relevant Medications    levothyroxine (SYNTHROID, LEVOTHROID) 50 MCG tablet    Prediabetes  As above.  Will continue to monitor       Gastrointestinal Abdominal     Gastroesophageal reflux disease without esophagitis  Reminded regarding lifestyle modification  Continue current medication    Relevant Medications    pantoprazole (PROTONIX) 40 MG EC tablet    IBS (irritable bowel syndrome)  As above.       Genitourinary and Reproductive     Bladder instability    H/O recurrent urinary tract infection                Pelvic relaxation  Follow up with gynecology       Health Encounters    Healthcare maintenance  Recommended RSV, Shingrix, Tdap, and updated COVID-19  Will arrange an updated LDCT of the chest    Relevant Medications    vitamin B-12 (CYANOCOBALAMIN) 1000 MCG tablet    Other Relevant Orders     CT Chest Low Dose Cancer Screening WO       Infectious Diseases    Oral candidiasis  Reminded to rinse her mouth after each use of her maintenance inhaler  We will start on antifungal therapy    Relevant Medications    fluconazole (DIFLUCAN) 150 MG tablet  nystatin (MYCOSTATIN) 100,000 unit/mL suspension       Musculoskeletal and Injuries    Age-related osteoporosis without current pathological fracture  Encouraged to continue to pursue weight bearing activities while exercising joint protection.  Continue current medication    Primary osteoarthritis       Neuro    Migraine without aura and without status migrainosus, not intractable    Relevant Medications    SUMAtriptan (IMITREX) 100 MG tablet    dilTIAZem (TIAZAC) 360 MG 24 hr capsule       Pulmonary and Pneumonias    COPD mixed type   COPD is stable.  Reminded of the importance of smoking cessation  Encouraged to remain as active as symptoms allow for  Continue current medication    Relevant Medications    Fluticasone-Umeclidin-Vilant (Trelegy Ellipta) 100-62.5-25 MCG/ACT inhaler    montelukast  (SINGULAIR) 10 MG tablet    ipratropium-albuterol (DUO-NEB) 0.5-2.5 mg/3 ml nebulizer    Other Relevant Orders     CT Chest Low Dose Cancer Screening WO       Tobacco    Smoker    Relevant Orders     CT Chest Low Dose Cancer Screening WO       Other    Hypothyroidism due to Hashimoto thyroiditis  Clinically euthyroid.  Continue current medication.   Will continue to monitor    Relevant Medications    levothyroxine (SYNTHROID, LEVOTHROID) 50 MCG tablet     Diagnoses         Codes Comments      Mixed hyperlipidemia    -  Primary ICD-10-CM: E78.2  ICD-9-CM: 272.2       Essential hypertension     ICD-10-CM: I10  ICD-9-CM: 401.9       Coronary artery calcification seen on CT scan     ICD-10-CM: I25.10  ICD-9-CM: 414.00       Prediabetes     ICD-10-CM: R73.03  ICD-9-CM: 790.29       Goiter     ICD-10-CM: E04.9  ICD-9-CM: 240.9       Other irritable bowel syndrome     ICD-10-CM: K58.8  ICD-9-CM: 564.1       Gastroesophageal reflux disease without esophagitis     ICD-10-CM: K21.9  ICD-9-CM: 530.81       Pelvic relaxation     ICD-10-CM: N81.89  ICD-9-CM: 618.89       H/O recurrent urinary tract infection     ICD-10-CM: Z87.440  ICD-9-CM: V13.02       Bladder instability     ICD-10-CM: N32.89  ICD-9-CM: 596.89       Healthcare maintenance     ICD-10-CM: Z00.00  ICD-9-CM: V70.0       Primary osteoarthritis involving multiple joints     ICD-10-CM: M15.0  ICD-9-CM: 715.98       Age-related osteoporosis without current pathological fracture     ICD-10-CM: M81.0  ICD-9-CM: 733.01       Migraine without aura and without status migrainosus, not intractable     ICD-10-CM: G43.009  ICD-9-CM: 346.10       COPD mixed type     ICD-10-CM: J44.9  ICD-9-CM: 496       Smoker     ICD-10-CM: F17.200  ICD-9-CM: 305.1       Hypothyroidism due to Hashimoto thyroiditis     ICD-10-CM: E06.3  ICD-9-CM: 245.2       Oral candidiasis     ICD-10-CM: B37.0  ICD-9-CM: 112.0       Personal history of nicotine dependence     ICD-10-CM: Z87.891  ICD-9-CM:  V15.82

## 2025-04-11 ENCOUNTER — LAB (OUTPATIENT)
Dept: FAMILY MEDICINE CLINIC | Facility: CLINIC | Age: 75
End: 2025-04-11
Payer: MEDICARE

## 2025-04-11 DIAGNOSIS — M81.0 AGE-RELATED OSTEOPOROSIS WITHOUT CURRENT PATHOLOGICAL FRACTURE: ICD-10-CM

## 2025-04-11 DIAGNOSIS — K21.9 GASTROESOPHAGEAL REFLUX DISEASE WITHOUT ESOPHAGITIS: ICD-10-CM

## 2025-04-11 DIAGNOSIS — I10 ESSENTIAL HYPERTENSION: ICD-10-CM

## 2025-04-11 DIAGNOSIS — K58.8 OTHER IRRITABLE BOWEL SYNDROME: ICD-10-CM

## 2025-04-11 DIAGNOSIS — I25.10 CORONARY ARTERY CALCIFICATION SEEN ON CT SCAN: ICD-10-CM

## 2025-04-11 DIAGNOSIS — E06.3 HYPOTHYROIDISM DUE TO HASHIMOTO THYROIDITIS: ICD-10-CM

## 2025-04-11 DIAGNOSIS — R73.03 PREDIABETES: ICD-10-CM

## 2025-04-11 DIAGNOSIS — E78.2 MIXED HYPERLIPIDEMIA: ICD-10-CM

## 2025-04-11 DIAGNOSIS — J44.9 COPD MIXED TYPE: ICD-10-CM

## 2025-04-11 LAB
ALBUMIN SERPL-MCNC: 4.2 G/DL (ref 3.5–5.2)
ALBUMIN/GLOB SERPL: 1.1 G/DL
ALP SERPL-CCNC: 56 U/L (ref 39–117)
ALT SERPL W P-5'-P-CCNC: 12 U/L (ref 1–33)
ANION GAP SERPL CALCULATED.3IONS-SCNC: 9.3 MMOL/L (ref 5–15)
AST SERPL-CCNC: 17 U/L (ref 1–32)
BASOPHILS # BLD AUTO: 0.08 10*3/MM3 (ref 0–0.2)
BASOPHILS NFR BLD AUTO: 0.7 % (ref 0–1.5)
BILIRUB SERPL-MCNC: 0.5 MG/DL (ref 0–1.2)
BUN SERPL-MCNC: 13 MG/DL (ref 8–23)
BUN/CREAT SERPL: 16 (ref 7–25)
CALCIUM SPEC-SCNC: 9.7 MG/DL (ref 8.6–10.5)
CHLORIDE SERPL-SCNC: 102 MMOL/L (ref 98–107)
CHOLEST SERPL-MCNC: 184 MG/DL (ref 0–200)
CO2 SERPL-SCNC: 24.7 MMOL/L (ref 22–29)
CREAT SERPL-MCNC: 0.81 MG/DL (ref 0.57–1)
DEPRECATED RDW RBC AUTO: 46 FL (ref 37–54)
EGFRCR SERPLBLD CKD-EPI 2021: 76.3 ML/MIN/1.73
EOSINOPHIL # BLD AUTO: 0.18 10*3/MM3 (ref 0–0.4)
EOSINOPHIL NFR BLD AUTO: 1.7 % (ref 0.3–6.2)
ERYTHROCYTE [DISTWIDTH] IN BLOOD BY AUTOMATED COUNT: 12.9 % (ref 12.3–15.4)
GLOBULIN UR ELPH-MCNC: 3.8 GM/DL
GLUCOSE SERPL-MCNC: 97 MG/DL (ref 65–99)
HCT VFR BLD AUTO: 37.8 % (ref 34–46.6)
HDLC SERPL-MCNC: 58 MG/DL (ref 40–60)
HGB BLD-MCNC: 12.9 G/DL (ref 12–15.9)
IMM GRANULOCYTES # BLD AUTO: 0.04 10*3/MM3 (ref 0–0.05)
IMM GRANULOCYTES NFR BLD AUTO: 0.4 % (ref 0–0.5)
LDLC SERPL CALC-MCNC: 111 MG/DL (ref 0–100)
LDLC/HDLC SERPL: 1.89 {RATIO}
LYMPHOCYTES # BLD AUTO: 4.97 10*3/MM3 (ref 0.7–3.1)
LYMPHOCYTES NFR BLD AUTO: 46.4 % (ref 19.6–45.3)
MCH RBC QN AUTO: 33.1 PG (ref 26.6–33)
MCHC RBC AUTO-ENTMCNC: 34.1 G/DL (ref 31.5–35.7)
MCV RBC AUTO: 96.9 FL (ref 79–97)
MONOCYTES # BLD AUTO: 0.48 10*3/MM3 (ref 0.1–0.9)
MONOCYTES NFR BLD AUTO: 4.5 % (ref 5–12)
NEUTROPHILS NFR BLD AUTO: 4.97 10*3/MM3 (ref 1.7–7)
NEUTROPHILS NFR BLD AUTO: 46.3 % (ref 42.7–76)
NRBC BLD AUTO-RTO: 0 /100 WBC (ref 0–0.2)
PLATELET # BLD AUTO: 272 10*3/MM3 (ref 140–450)
PMV BLD AUTO: 10.5 FL (ref 6–12)
POTASSIUM SERPL-SCNC: 4.5 MMOL/L (ref 3.5–5.2)
PROT SERPL-MCNC: 8 G/DL (ref 6–8.5)
RBC # BLD AUTO: 3.9 10*6/MM3 (ref 3.77–5.28)
SODIUM SERPL-SCNC: 136 MMOL/L (ref 136–145)
TRIGL SERPL-MCNC: 81 MG/DL (ref 0–150)
TSH SERPL DL<=0.05 MIU/L-ACNC: 4.62 UIU/ML (ref 0.27–4.2)
VLDLC SERPL-MCNC: 15 MG/DL (ref 5–40)
WBC NRBC COR # BLD AUTO: 10.72 10*3/MM3 (ref 3.4–10.8)

## 2025-04-11 PROCEDURE — 84443 ASSAY THYROID STIM HORMONE: CPT | Performed by: GENERAL PRACTICE

## 2025-04-11 PROCEDURE — 36415 COLL VENOUS BLD VENIPUNCTURE: CPT

## 2025-04-11 PROCEDURE — 82306 VITAMIN D 25 HYDROXY: CPT | Performed by: GENERAL PRACTICE

## 2025-04-11 PROCEDURE — 80053 COMPREHEN METABOLIC PANEL: CPT | Performed by: GENERAL PRACTICE

## 2025-04-11 PROCEDURE — 85025 COMPLETE CBC W/AUTO DIFF WBC: CPT | Performed by: GENERAL PRACTICE

## 2025-04-11 PROCEDURE — 80061 LIPID PANEL: CPT | Performed by: GENERAL PRACTICE

## 2025-04-12 LAB — 25(OH)D3 SERPL-MCNC: 40.3 NG/ML (ref 30–100)

## 2025-05-05 ENCOUNTER — HOSPITAL ENCOUNTER (OUTPATIENT)
Dept: CT IMAGING | Facility: HOSPITAL | Age: 75
Discharge: HOME | End: 2025-05-05
Admitting: GENERAL PRACTICE
Payer: MEDICARE

## 2025-05-05 DIAGNOSIS — F17.200 SMOKER: ICD-10-CM

## 2025-05-05 DIAGNOSIS — Z87.891 PERSONAL HISTORY OF NICOTINE DEPENDENCE: ICD-10-CM

## 2025-05-05 DIAGNOSIS — J44.9 COPD MIXED TYPE: ICD-10-CM

## 2025-05-05 DIAGNOSIS — Z00.00 HEALTHCARE MAINTENANCE: ICD-10-CM

## 2025-05-05 PROCEDURE — 71271 CT THORAX LUNG CANCER SCR C-: CPT

## 2025-05-06 ENCOUNTER — RESULTS FOLLOW-UP (OUTPATIENT)
Dept: FAMILY MEDICINE CLINIC | Facility: CLINIC | Age: 75
End: 2025-05-06
Payer: MEDICARE

## 2025-05-06 PROCEDURE — 71271 CT THORAX LUNG CANCER SCR C-: CPT | Performed by: RADIOLOGY

## 2025-05-06 NOTE — LETTER
Albino Estrella   [address]  [place] [address]    May 7, 2025     Dear Ms. Estrella:    Below are the results from your recent visit:    This letter is to inform you that your recent CT low dose scan came back normal and we will repeat in 1 year.       If you have any questions or concerns, please don't hesitate to call.         Sincerely,        Stan Alvarado MD

## 2025-07-10 ENCOUNTER — OFFICE VISIT (OUTPATIENT)
Dept: FAMILY MEDICINE CLINIC | Facility: CLINIC | Age: 75
End: 2025-07-10
Payer: MEDICARE

## 2025-07-10 VITALS
WEIGHT: 103 LBS | HEART RATE: 60 BPM | RESPIRATION RATE: 15 BRPM | SYSTOLIC BLOOD PRESSURE: 120 MMHG | DIASTOLIC BLOOD PRESSURE: 64 MMHG | BODY MASS INDEX: 19.45 KG/M2 | OXYGEN SATURATION: 96 % | HEIGHT: 61 IN | TEMPERATURE: 98.6 F

## 2025-07-10 DIAGNOSIS — Z12.31 ENCOUNTER FOR SCREENING MAMMOGRAM FOR BREAST CANCER: ICD-10-CM

## 2025-07-10 DIAGNOSIS — E04.9 GOITER: ICD-10-CM

## 2025-07-10 DIAGNOSIS — K58.8 OTHER IRRITABLE BOWEL SYNDROME: ICD-10-CM

## 2025-07-10 DIAGNOSIS — I10 ESSENTIAL HYPERTENSION: ICD-10-CM

## 2025-07-10 DIAGNOSIS — J44.9 COPD MIXED TYPE: ICD-10-CM

## 2025-07-10 DIAGNOSIS — R73.03 PREDIABETES: ICD-10-CM

## 2025-07-10 DIAGNOSIS — K21.9 GASTROESOPHAGEAL REFLUX DISEASE WITHOUT ESOPHAGITIS: ICD-10-CM

## 2025-07-10 DIAGNOSIS — G43.009 MIGRAINE WITHOUT AURA AND WITHOUT STATUS MIGRAINOSUS, NOT INTRACTABLE: ICD-10-CM

## 2025-07-10 DIAGNOSIS — M15.0 PRIMARY OSTEOARTHRITIS INVOLVING MULTIPLE JOINTS: ICD-10-CM

## 2025-07-10 DIAGNOSIS — F17.200 SMOKER: ICD-10-CM

## 2025-07-10 DIAGNOSIS — M81.0 AGE-RELATED OSTEOPOROSIS WITHOUT CURRENT PATHOLOGICAL FRACTURE: ICD-10-CM

## 2025-07-10 DIAGNOSIS — E78.2 MIXED HYPERLIPIDEMIA: Primary | ICD-10-CM

## 2025-07-10 DIAGNOSIS — N81.89 PELVIC RELAXATION: ICD-10-CM

## 2025-07-10 DIAGNOSIS — E06.3 HYPOTHYROIDISM DUE TO HASHIMOTO THYROIDITIS: ICD-10-CM

## 2025-07-10 DIAGNOSIS — Z00.00 HEALTHCARE MAINTENANCE: ICD-10-CM

## 2025-07-10 DIAGNOSIS — N32.89 BLADDER INSTABILITY: ICD-10-CM

## 2025-07-10 DIAGNOSIS — I25.10 CORONARY ARTERY CALCIFICATION SEEN ON CT SCAN: ICD-10-CM

## 2025-07-10 PROBLEM — B37.0 ORAL CANDIDIASIS: Status: RESOLVED | Noted: 2018-09-14 | Resolved: 2025-07-10

## 2025-07-10 NOTE — PROGRESS NOTES
Aicha Estrella is a 74 y.o. female.     Chief Complaint  She returns for a scheduled reassessment of multiple medical problems including gastroesophageal reflux disease, irritable bowel syndrome, chronic obstructive pulmonary disease, essential hypertension, hyperlipidemia, hypothyroidism, chronic low back pain, osteoporosis, and chronic pelvic pressure    History of Present Illness     Gastroesophageal Reflux Disease  She remains heartburn free on pantoprazole.  She continues to deny any difficulty swallowing, regurgitation, vomiting, or abdominal pain  Lab Results   Component Value Date    WBC 10.72 04/11/2025    HGB 12.9 04/11/2025    HCT 37.8 04/11/2025    MCV 96.9 04/11/2025     04/11/2025     IBS with Diarrhea  She continues to have intermittent diarrhea.  She has had no problems at night.  With the exception of occasional mild nausea, she continues to deny any associated symptoms.  There is no history of any abdominal pain, hematochezia, or melena, and she continues to deny any fever, chills, or night sweats.  Her last colonoscopy was on 10/26/2018 at which time several hyperplastic polyps were removed.  She remains uninterested in pursuing an updated study    COPD  She continues to experience intermittent SOB, cough and wheezing.  There is no history of any chest pain or hemoptysis, and she continues to deny any fever, chills, or night sweats. She remains on trelegy and feels that it helps. She continues to use nebulized albuterol 4 times daily. She continues to smoke 1 pack per day. Updated low dose CT of the chest performed on 5/5/2025 was reported as showing a stable 1.5 cm pleural-based LLL nodule, moderate coronary artery calcifications, and degenerative changes of the spine.  A one year follow-up was recommended    Essential Hypertension  She admits to dyspnea on exertion.  She continues to deny any chest pain, palpitations, orthopnea, paroxysmal nocturnal dyspnea or peripheral  edema.  She remains on diltiazem and spironolactone.   Lab Results   Component Value Date    GLUCOSE 97 04/11/2025    BUN 13 04/11/2025    CREATININE 0.81 04/11/2025     04/11/2025    K 4.5 04/11/2025     04/11/2025    CALCIUM 9.7 04/11/2025    PROTEINTOT 8.0 04/11/2025    ALBUMIN 4.2 04/11/2025    ALT 12 04/11/2025    AST 17 04/11/2025    ALKPHOS 56 04/11/2025    BILITOT 0.5 04/11/2025    GLOB 3.8 04/11/2025    AGRATIO 1.1 04/11/2025    BCR 16.0 04/11/2025    ANIONGAP 9.3 04/11/2025    EGFR 76.3 04/11/2025     Hyperlipidemia  Her compliance with treatment has been fair.  She remains fairly active about the house.  She remains on atorvastatin.   Lab Results   Component Value Date    CHOL 184 04/11/2025    CHLPL 175 10/15/2024    TRIG 81 04/11/2025    HDL 58 04/11/2025     (H) 04/11/2025     Hypothyroidism  She remains on levothyroxine 50 daily with no apparent side effects.    Lab Results   Component Value Date    TSH 4.620 (H) 04/11/2025     Chronic Low Back Pain  She feels her back pain remains at baseline. The pain is described as a bilateral ache.  This does not radiate elsewhere, and remains unassociated with any other symptoms.  She continues to deny any changes in her strength, sensation, or bowel/bladder control and there is no history of any fever, chills, or night sweats.  The pain is worse with prolonged standing and improves with rest or change of position.    Osteoporosis  She remains on denosumab with no apparent side effects. Most recent vitamin D 40.3    Pelvic Pressure  She was fit for a ring by VARINDER Oropeza) at [facility] on 11/1/23 with a prompt improvement in both her sense of pressure and in her urinary frequency, urgency, dysuria, and incontinence. She continues to deny any hematuria, vaginal bleeding or discharge.  She underwent a recent reassessment and was apparently scheduled a 6-month follow-up    The following portions of the patient's history were  reviewed and updated as appropriate: allergies, current medications, past medical history, past social history, and problem list.    Review of Systems   Constitutional:  Positive for fatigue. Negative for chills and fever.   HENT:  Positive for congestion, rhinorrhea and sneezing. Negative for ear pain, sore throat and voice change.    Eyes:  Negative for visual disturbance.   Respiratory:  Positive for cough, shortness of breath and wheezing.    Cardiovascular:  Negative for chest pain, palpitations and leg swelling.   Gastrointestinal:  Positive for diarrhea and nausea. Negative for abdominal pain, blood in stool, constipation and vomiting.   Genitourinary:  Positive for frequency, pelvic pressure and urgency. Negative for dysuria, hematuria, vaginal bleeding and vaginal pain.   Musculoskeletal:  Positive for back pain. Negative for arthralgias, joint swelling and myalgias.   Skin:  Negative for rash.   Neurological:  Positive for dizziness (occasional). Negative for weakness and numbness.   Psychiatric/Behavioral:  Negative for sleep disturbance and depressed mood. The patient is not nervous/anxious.      Objective   Physical Exam  Constitutional:       General: She is not in acute distress.     Appearance: Normal appearance. She is well-developed. She is not diaphoretic.      Comments: Accompanied by her . Bright and in fair spirits. No apparent distress. No pallor, jaundice, diaphoresis, or cyanosis.   HENT:      Head: Atraumatic.      Right Ear: Tympanic membrane, ear canal and external ear normal.      Left Ear: Tympanic membrane, ear canal and external ear normal.      Mouth/Throat:      Lips: No lesions.      Mouth: Mucous membranes are moist. No oral lesions.      Pharynx: No oropharyngeal exudate or posterior oropharyngeal erythema.   Eyes:      General: Lids are normal.      Extraocular Movements: Extraocular movements intact.      Conjunctiva/sclera: Conjunctivae normal.      Pupils: Pupils are  equal.   Neck:      Thyroid: Thyromegaly (right lobe more prominent then the left) present. No thyroid mass.      Vascular: No carotid bruit or JVD.      Trachea: Trachea normal. No tracheal deviation.   Cardiovascular:      Rate and Rhythm: Normal rate and regular rhythm.      Heart sounds: Normal heart sounds, S1 normal and S2 normal. No murmur heard.     No gallop.   Pulmonary:      Effort: Pulmonary effort is normal.      Breath sounds: Examination of the right-lower field reveals decreased breath sounds. Examination of the left-lower field reveals decreased breath sounds. Decreased breath sounds and wheezing (diffuse - mild) present. No rales.      Comments: Pulmonary hyperinflation  Abdominal:      General: Bowel sounds are normal. There is no distension.   Musculoskeletal:      Right lower leg: No edema.      Left lower leg: No edema.   Lymphadenopathy:      Head:      Right side of head: No submental, submandibular, tonsillar, preauricular, posterior auricular or occipital adenopathy.      Left side of head: No submental, submandibular, tonsillar, preauricular, posterior auricular or occipital adenopathy.      Cervical: No cervical adenopathy.      Upper Body:      Right upper body: No supraclavicular adenopathy.      Left upper body: No supraclavicular adenopathy.   Skin:     General: Skin is warm.      Coloration: Skin is not cyanotic, jaundiced or pale.      Findings: No rash.      Nails: There is no clubbing.   Neurological:      Mental Status: She is alert and oriented to person, place, and time.      Cranial Nerves: No cranial nerve deficit, dysarthria or facial asymmetry.      Sensory: No sensory deficit.      Motor: No tremor.      Coordination: Coordination normal.      Gait: Gait normal.   Psychiatric:         Attention and Perception: Attention normal.         Mood and Affect: Mood normal.         Speech: Speech normal.         Behavior: Behavior normal.         Thought Content: Thought content  normal.       Assessment & Plan   Problems Addressed this Visit          Cardiac and Vasculature    Coronary artery calcification seen on CT scan  Reminded regarding risk factor modification with an emphasis on tobacco cessation.  Continue low-dose ASA.    Essential hypertension   Hypertension: at goal. Evidence of target organ damage: coronary artery calcifications on previous imaging.  Encouraged to continue to work on her diet and exercise plan.  Continue current medication    Mixed hyperlipidemia   As above.   Continue current medication.       Endocrine and Metabolic    Goiter    Prediabetes  As above.  Will continue to monitor       Gastrointestinal Abdominal     Gastroesophageal reflux disease without esophagitis  Reminded regarding lifestyle modification  Continue current medication  Encouraged to report if any worse or if any new symptoms or concerns.    IBS (irritable bowel syndrome)  As above.       Genitourinary and Reproductive     Bladder instability    Encounter for screening mammogram for breast cancer    Relevant Orders    Mammo Screening Digital Tomosynthesis Bilateral With CAD    Pelvic relaxation  Follow up with gynecology       Health Encounters    Healthcare maintenance  Recommended Shingrix, RSV, and an updated Tdap  Reminded to get a flu shot when available.  Will arrange an updated mammogram and DEXA scan    Relevant Orders    Mammo Screening Digital Tomosynthesis Bilateral With CAD    DEXA Bone Density Axial       Musculoskeletal and Injuries    Age-related osteoporosis without current pathological fracture  As above.  Continue current medication    Relevant Orders    DEXA Bone Density Axial    Primary osteoarthritis       Neuro    Migraine without aura and without status migrainosus, not intractable       Pulmonary and Pneumonias    COPD mixed type   COPD is stable.  Reminded of the importance of smoking cessation  Encouraged to remain as active as symptoms allow for  Continue current  medication       Tobacco    Smoker       Other    Hypothyroidism due to Hashimoto thyroiditis  Clinically euthyroid.  Continue current medication.  Will continue to monitor     Diagnoses         Codes Comments      Mixed hyperlipidemia    -  Primary ICD-10-CM: E78.2  ICD-9-CM: 272.2       Essential hypertension     ICD-10-CM: I10  ICD-9-CM: 401.9       Coronary artery calcification seen on CT scan     ICD-10-CM: I25.10  ICD-9-CM: 414.00       Prediabetes     ICD-10-CM: R73.03  ICD-9-CM: 790.29       Goiter     ICD-10-CM: E04.9  ICD-9-CM: 240.9       Other irritable bowel syndrome     ICD-10-CM: K58.8  ICD-9-CM: 564.1       Gastroesophageal reflux disease without esophagitis     ICD-10-CM: K21.9  ICD-9-CM: 530.81       Pelvic relaxation     ICD-10-CM: N81.89  ICD-9-CM: 618.89       Bladder instability     ICD-10-CM: N32.89  ICD-9-CM: 596.89       Healthcare maintenance     ICD-10-CM: Z00.00  ICD-9-CM: V70.0       Primary osteoarthritis involving multiple joints     ICD-10-CM: M15.0  ICD-9-CM: 715.98       Age-related osteoporosis without current pathological fracture     ICD-10-CM: M81.0  ICD-9-CM: 733.01       Migraine without aura and without status migrainosus, not intractable     ICD-10-CM: G43.009  ICD-9-CM: 346.10       COPD mixed type     ICD-10-CM: J44.9  ICD-9-CM: 496       Smoker     ICD-10-CM: F17.200  ICD-9-CM: 305.1       Hypothyroidism due to Hashimoto thyroiditis     ICD-10-CM: E06.3  ICD-9-CM: 245.2       Encounter for screening mammogram for breast cancer     ICD-10-CM: Z12.31  ICD-9-CM: V76.12

## 2025-08-07 DIAGNOSIS — E78.2 MIXED HYPERLIPIDEMIA: ICD-10-CM

## 2025-08-07 RX ORDER — ATORVASTATIN CALCIUM 40 MG/1
40 TABLET, FILM COATED ORAL
Qty: 90 TABLET | Refills: 3 | Status: SHIPPED | OUTPATIENT
Start: 2025-08-07

## (undated) DEVICE — FRCP BX RADJAW4 NDL 2.8 240CM LG OG BX40

## (undated) DEVICE — SYR LUERLOK 30CC

## (undated) DEVICE — SINGLE PORT MANIFOLD: Brand: NEPTUNE 2

## (undated) DEVICE — Device: Brand: DEFENDO AIR/WATER/SUCTION AND BIOPSY VALVE

## (undated) DEVICE — TUBING, SUCTION, 1/4" X 20', STRAIGHT: Brand: MEDLINE INDUSTRIES, INC.

## (undated) DEVICE — Device: Brand: ENDOGATOR

## (undated) DEVICE — GOWN,REINF,POLY,ECL,PP SLV,XL: Brand: MEDLINE

## (undated) DEVICE — CONN Y IRR DISP 1P/U

## (undated) DEVICE — Device

## (undated) DEVICE — SNAR POLYP CAPTIFLX MICRO OVL 13MM 240CM